# Patient Record
Sex: MALE | Race: WHITE | Employment: OTHER | URBAN - METROPOLITAN AREA
[De-identification: names, ages, dates, MRNs, and addresses within clinical notes are randomized per-mention and may not be internally consistent; named-entity substitution may affect disease eponyms.]

---

## 2017-06-23 ENCOUNTER — HOSPITAL ENCOUNTER (OUTPATIENT)
Dept: GENERAL RADIOLOGY | Age: 82
Discharge: HOME | End: 2017-06-23
Attending: INTERNAL MEDICINE
Payer: MEDICARE

## 2017-06-23 ENCOUNTER — APPOINTMENT (OUTPATIENT)
Dept: GENERAL RADIOLOGY | Age: 82
End: 2017-06-23
Attending: INTERNAL MEDICINE
Payer: MEDICARE

## 2017-06-23 DIAGNOSIS — R13.10 DYSPHAGIA: ICD-10-CM

## 2017-06-23 PROCEDURE — 92611 MOTION FLUOROSCOPY/SWALLOW: CPT

## 2017-06-23 PROCEDURE — G8998 SWALLOW D/C STATUS: HCPCS

## 2017-06-23 PROCEDURE — 74230 X-RAY XM SWLNG FUNCJ C+: CPT

## 2017-06-23 PROCEDURE — G8997 SWALLOW GOAL STATUS: HCPCS

## 2017-06-23 PROCEDURE — G8996 SWALLOW CURRENT STATUS: HCPCS

## 2017-06-23 NOTE — PROGRESS NOTES
[address] [place] [address]    Speech Pathology Modified barium swallow Study  Patient: Tay Cowan (3 y.o. male)  Date: 6/23/2017  Referring Provider:  Dr. Alda Sanders:   Patient alert, cooperative. Patient accompanied by friend of 40 years, and patient provided history. Patient with low vocal volume, which both he and friend report is baseline. Patient reports getting \"strangled\" when swallowing, specifically with thin liquids. Patient also reports getting \"strangled\" on saliva when sleeping on his back. Reports this is alleviated by sleeping on his side. Occasionally reported feeling strangled with food, but mostly with thin liquid. Reports this occurs 1x per day, sometimes a couple times per day. Reports this has been occurring \"for years. \" Reports Endoscopy ~2.5 years ago with esophageal dilatation. Reports no reflux since that time, and also believes strangled feeling alleviated by dilatation. Reports additional dilatation scheduled for 7/27/17. Patient also with Barium swallow scheduled in the future. PMH significant for reflux and COPD. Patient denies PNA. Reports heavy upper respiratory congestion, however this occurred decades ago. Eats regular/thin liquid diet. Has never seen SLP or had MBS in past. Minimal globus sensation with pills reported.     OBJECTIVE:   Past Medical History:   Past Medical History:   Diagnosis Date    AI (aortic insufficiency)     Arrhythmia     murmur    BPH (benign prostatic hyperplasia)     Carotid artery stenosis     S/P Left CEA    Chronic pain     low back    CKD (chronic kidney disease) stage 2, GFR 60-89 ml/min     COPD (chronic obstructive pulmonary disease) (HCC)     DDD (degenerative disc disease)     chronic back pain    Diabetes (HCC)     borderline    DJD (degenerative joint disease)     DVT (deep venous thrombosis) ([address]     1998    Fatty liver     Frequent falls     GERD (gastroesophageal reflux disease)     H/O adenomatous polyp of colon     Hypercholesterolemia     Hypertension     MR (mitral regurgitation)     Overactive bladder     PUD (peptic ulcer disease)     in late teens    Thrombocytopenia ([address]     Venous insufficiency (chronic) (peripheral)     Vitamin D deficiency      Past Surgical History:   Procedure Laterality Date    COLONOSCOPY,DIAGNOSTIC  12/4/2014         HX BACK SURGERY  2015    bone spurs removed from lumbar spine (per pt)    HX CAROTID ENDARTERECTOMY      left    HX CATARACT REMOVAL      bilateral lens implant bilateral    HX KNEE REPLACEMENT      bilateral    HX ORTHOPAEDIC      foreign body removal-nail   right leg    TOTAL KNEE ARTHROPLASTY      bilateral    UPPER GI ENDOSCOPY,BIOPSY  1/31/2013          Current Dietary Status:  Regular/thin  Radiologist: Dr. Shashank Acevedo: Lateral;Fluoro  Patient Position: upright in hausted chair    Trial 1:   Consistency Presented: Thin liquid;Puree; Solid;Pill/Tablet   How Presented: Self-fed/presented;Cup/sip;Cup/gulp;Straw;SLP-fed/presented;Spoon       Bolus Acceptance: No impairment   Bolus Formation/Control: Impaired: Delayed;Premature spillage; Other (comment) (spillage to lateral sulci during bolus hold)   Propulsion: Delayed (# of seconds)   Oral Residue: Lingual;Other (comment) (posterior--mild)   Initiation of Swallow: No impairment   Timing: No impairment   Penetration: Before swallow; To cords (x1 with initial cup sip)   Aspiration/Timing: Silent ; Before; Other (comment) (x1 with initial cup sip)   Pharyngeal Clearance: Vallecular residue;10-50% (reduced with spontaneous additional swallow to min)   Attempted Modifications: Double swallow;Cup/sip;Straw   Effective Modifications: Double swallow   Cues for Modifications: None           Decreased Tongue Base Retraction?: Yes  Laryngeal Elevation: Other (comment) (WFL epiglottic inversion and laryngeal closure)  Aspiration/Penetration Score: 8 (Aspiration-Contrast passes cords/glottis with no effort to eject, ie/silent aspiration)  Pharyngeal Symmetry: Not assessed  Pharyngeal-Esophageal Segment: No impairment  Pharyngeal Dysfunction: Decreased tongue base retraction    Oral Phase Severity: Mild (functional)  Pharyngeal Phase Severity: Mild (functional)     In compliance with CMSs Claims Based Outcome Reporting, the following G-code set was chosen for this patient based the use of the NOMS functional outcome to quantify this patient's level of swallowing impairment. Using the NOMS, the patient was determined to be at level 6 for swallow function which correlates with the CI= 1-19% level of severity. Based on the objective assessment provided within this note, the current, goal, and discharge g-codes are as follows:    Swallow  Swallowing:   Swallow Current Status CI= 1-19%   Swallow Goal Status CI= 1-19%   Swallow D/C Status CI= 1-19%          NOMS Swallowing Levels:  Level 1 (CN): NPO  Level 2 (CM): NPO but takes consistency in therapy  Level 3 (CL): Takes less than 50% of nutrition p.o. and continues with nonoral feedings; and/or safe with mod cues; and/or max diet restriction  Level 4 (CK): Safe swallow but needs mod cues; and/or mod diet restriction; and/or still requires some nonoral feeding/supplements  Level 5 (CJ): Safe swallow with min diet restriction; and/or needs min cues  Level 6 (CI): Independent with p.o.; rare cues; usually self cues; may need to avoid some foods or needs extra time  Level 7 ([address] [place] [address]): Independent for all p.o.  MATHEW. (2003). National Outcomes Measurement System (NOMS): Adult Speech-Language Pathology User's Guide. ASSESSMENT :  Based on the objective data described above, the patient presents with mild oropharyngeal dysphagia, however oropharyngeal swallow is overall functional. Patient with slow oral prep, premature spillage, delayed posterior propulsion, and mild posterior lingual residue. Pharyngeal dysphagia characterized by reduced tongue base retraction resulting in moderate pharyngeal residue that reduced to mild with a spontaneous additional swallow. Patient with silent aspiration x1 before the swallow on initial cup gulp of thin liquids secondary to premature spillage. Unable to reproduce aspiration event with additional trials via cup or straw. Barium tablet passed through pharynx with no difficulty. PLAN/RECOMMENDATIONS :  --Continue regular/thin liquid diet with general aspiration precautions, including upright for all PO intake, remain upright for 60 minutes after PO intake, small/single bites and sips, slow rate, and remove distractions with PO intake  --No further SLP follow up indicated     COMMUNICATION/EDUCATION:   The above findings and recommendations were discussed with: patient and female friend who verbalized understanding.     Thank you for this referral.  JACQUI Lockwood  Time Calculation: 20 mins

## 2017-06-26 ENCOUNTER — HOSPITAL ENCOUNTER (OUTPATIENT)
Dept: GENERAL RADIOLOGY | Age: 82
Discharge: HOME | End: 2017-06-26
Attending: INTERNAL MEDICINE
Payer: MEDICARE

## 2017-06-26 DIAGNOSIS — R13.10 DYSPHAGIA: ICD-10-CM

## 2017-06-26 PROCEDURE — 74220 X-RAY XM ESOPHAGUS 1CNTRST: CPT

## 2017-07-26 ENCOUNTER — OFFICE VISIT (OUTPATIENT)
Dept: INTERNAL MEDICINE CLINIC | Age: 82
End: 2017-07-26

## 2017-07-26 VITALS
WEIGHT: 231 LBS | BODY MASS INDEX: 29.65 KG/M2 | TEMPERATURE: 97.9 F | HEART RATE: 55 BPM | SYSTOLIC BLOOD PRESSURE: 139 MMHG | DIASTOLIC BLOOD PRESSURE: 63 MMHG | OXYGEN SATURATION: 95 % | RESPIRATION RATE: 20 BRPM | HEIGHT: 74 IN

## 2017-07-26 DIAGNOSIS — R29.6 FREQUENT FALLS: ICD-10-CM

## 2017-07-26 DIAGNOSIS — I10 ESSENTIAL HYPERTENSION, BENIGN: ICD-10-CM

## 2017-07-26 DIAGNOSIS — E78.2 HYPERLIPIDEMIA, MIXED: ICD-10-CM

## 2017-07-26 DIAGNOSIS — N18.2 CKD (CHRONIC KIDNEY DISEASE) STAGE 2, GFR 60-89 ML/MIN: ICD-10-CM

## 2017-07-26 DIAGNOSIS — I38 HEART VALVE DISORDER: ICD-10-CM

## 2017-07-26 DIAGNOSIS — E11.40 CONTROLLED TYPE 2 DIABETES MELLITUS WITH DIABETIC NEUROPATHY, WITHOUT LONG-TERM CURRENT USE OF INSULIN (HCC): Primary | ICD-10-CM

## 2017-07-26 PROBLEM — R15.1 FECAL SOILING: Status: ACTIVE | Noted: 2017-07-26

## 2017-07-26 PROBLEM — R60.0 EDEMA EXTREMITIES: Status: ACTIVE | Noted: 2017-07-26

## 2017-07-26 PROBLEM — S32.020A COMPRESSION FRACTURE OF L2 LUMBAR VERTEBRA (HCC): Status: ACTIVE | Noted: 2017-07-26

## 2017-07-26 PROBLEM — I83.009 VENOUS STASIS ULCERS (HCC): Status: ACTIVE | Noted: 2017-07-26

## 2017-07-26 PROBLEM — J30.9 ALLERGIC RHINITIS: Status: ACTIVE | Noted: 2017-07-26

## 2017-07-26 PROBLEM — Z91.81 RISK FOR FALLS: Status: ACTIVE | Noted: 2017-07-26

## 2017-07-26 PROBLEM — N52.9 ED (ERECTILE DYSFUNCTION): Status: ACTIVE | Noted: 2017-07-26

## 2017-07-26 PROBLEM — M65.332 TRIGGER MIDDLE FINGER OF LEFT HAND: Status: ACTIVE | Noted: 2017-07-26

## 2017-07-26 PROBLEM — K42.9 UMBILICAL HERNIA: Status: ACTIVE | Noted: 2017-07-26

## 2017-07-26 PROBLEM — K44.9 HIATAL HERNIA: Status: ACTIVE | Noted: 2017-07-26

## 2017-07-26 PROBLEM — R13.10 DYSPHAGIA: Status: ACTIVE | Noted: 2017-07-26

## 2017-07-26 PROBLEM — L97.909 VENOUS STASIS ULCERS (HCC): Status: ACTIVE | Noted: 2017-07-26

## 2017-07-26 PROBLEM — R80.9 PROTEINURIA: Status: ACTIVE | Noted: 2017-07-26

## 2017-07-26 PROBLEM — H91.90 HEARING LOSS: Status: ACTIVE | Noted: 2017-07-26

## 2017-07-26 PROBLEM — M25.511 RIGHT SHOULDER PAIN: Status: ACTIVE | Noted: 2017-07-26

## 2017-07-26 PROBLEM — G56.22 TARDY PALSY OF LEFT ULNAR NERVE: Status: ACTIVE | Noted: 2017-07-26

## 2017-07-26 PROBLEM — G89.29 CHRONIC BACK PAIN: Status: ACTIVE | Noted: 2017-07-26

## 2017-07-26 PROBLEM — M54.9 CHRONIC BACK PAIN: Status: ACTIVE | Noted: 2017-07-26

## 2017-07-26 PROBLEM — K63.5 COLON POLYP: Status: ACTIVE | Noted: 2017-07-26

## 2017-07-26 PROBLEM — I45.10 INCOMPLETE RIGHT BUNDLE BRANCH BLOCK: Status: ACTIVE | Noted: 2017-07-26

## 2017-07-26 LAB
ALBUMIN SERPL-MCNC: 4.3 G/DL (ref 3.9–5.4)
ALKALINE PHOS POC: 76 U/L (ref 38–126)
ALT SERPL-CCNC: 38 U/L (ref 9–52)
AST SERPL-CCNC: 36 U/L (ref 14–36)
BUN BLD-MCNC: 51 MG/DL (ref 9–20)
CALCIUM BLD-MCNC: 9.4 MG/DL (ref 8.4–10.2)
CHLORIDE BLD-SCNC: 102 MMOL/L (ref 98–107)
CHOLEST SERPL-MCNC: 139 MG/DL (ref 0–200)
CO2 POC: 27 MMOL/L (ref 22–32)
CREAT BLD-MCNC: 0.7 MG/DL (ref 0.8–1.5)
EGFR (POC): 89.1
GLUCOSE POC: 114 MG/DL (ref 75–110)
HBA1C MFR BLD HPLC: 6.2 % (ref 4–5.7)
HDLC SERPL-MCNC: 30 MG/DL (ref 35–130)
LDL CHOLESTEROL POC: 75.6 MG/DL (ref 0–130)
POTASSIUM SERPL-SCNC: 4.4 MMOL/L (ref 3.6–5)
PROT SERPL-MCNC: 7.2 G/DL (ref 6.3–8.2)
SODIUM SERPL-SCNC: 146 MMOL/L (ref 137–145)
TCHOL/HDL RATIO (POC): 4.6 (ref 0–4)
TOTAL BILIRUBIN POC: 1 MG/DL (ref 0.2–1.3)
TRIGL SERPL-MCNC: 167 MG/DL (ref 0–200)
VLDLC SERPL CALC-MCNC: 33.4 MG/DL

## 2017-07-26 RX ORDER — FUROSEMIDE 80 MG/1
120 TABLET ORAL DAILY
COMMUNITY
End: 2018-01-24 | Stop reason: SDUPTHER

## 2017-07-26 RX ORDER — ATENOLOL 50 MG/1
TABLET ORAL DAILY
COMMUNITY
End: 2017-09-09 | Stop reason: SDUPTHER

## 2017-07-26 RX ORDER — NAPROXEN SODIUM 220 MG
220 TABLET ORAL
COMMUNITY
End: 2019-10-02

## 2017-07-26 RX ORDER — CLOTRIMAZOLE AND BETAMETHASONE DIPROPIONATE 10; .64 MG/G; MG/G
CREAM TOPICAL 2 TIMES DAILY
COMMUNITY
End: 2018-01-03

## 2017-07-26 RX ORDER — ATORVASTATIN CALCIUM 40 MG/1
40 TABLET, FILM COATED ORAL DAILY
COMMUNITY
End: 2018-01-03 | Stop reason: SDUPTHER

## 2017-07-26 RX ORDER — POLYETHYLENE GLYCOL 3350 17 G/17G
17 POWDER, FOR SOLUTION ORAL
COMMUNITY

## 2017-07-26 RX ORDER — VARDENAFIL HYDROCHLORIDE 20 MG/1
20 TABLET ORAL AS NEEDED
COMMUNITY

## 2017-07-26 NOTE — MR AVS SNAPSHOT
Visit Information Date & Time Provider Department Dept. Phone Encounter #  
 7/26/2017 10:30 AM Gregory Finch MD [facility] [phone] [id number] Follow-up Instructions Return in about 3 months (around 10/26/2017) for Fasting, Diabetes, lipids. Upcoming Health Maintenance Date Due  
 LIPID PANEL Q1 1935 FOOT EXAM Q1 1/25/1947 MICROALBUMIN Q1 1/25/1947 EYE EXAM RETINAL OR DILATED Q1 1/25/1947 DTaP/Tdap/Td series (1 - Tdap) 1/25/1958 ZOSTER VACCINE AGE 60> 11/25/1996 GLAUCOMA SCREENING Q2Y 1/25/2002 MEDICARE YEARLY EXAM 1/25/2002 HEMOGLOBIN A1C Q6M 4/9/2016 INFLUENZA AGE 9 TO ADULT 8/1/2017 Pneumococcal 65+ Low/Medium Risk (2 of 2 - PPSV23) 9/12/2019 Allergies as of 7/26/2017  Review Complete On: 7/26/2017 By: Fabienne Hopkins LPN Severity Noted Reaction Type Reactions Pcn [Penicillins] High 08/17/2011   Systemic Swelling Swelling of legs & feet Oxycontin [Oxycodone] Medium 06/19/2012   Side Effect Other (comments) Agitation/felt irritable Ibuprofen  03/13/2015    Other (comments) \"Rage\" Niaspan [Niacin]  07/26/2017    Rash Vesicare [Solifenacin]  07/26/2017    Other (comments) Attributes: Abdominal pain Current Immunizations  Reviewed on 3/12/2015 Name Date Hep A Vaccine 9/1/2007 Influenza Vaccine 9/12/2014 Pneumococcal Conjugate (PCV-13) 1/1/2012 Pneumococcal Vaccine (Unspecified Type) 9/12/2014 Td 10/19/2012 Zoster Vaccine, Live 10/1/2010 Not reviewed this visit You Were Diagnosed With   
  
 Codes Comments Controlled type 2 diabetes mellitus with diabetic neuropathy, without long-term current use of insulin ([facility]    -  Primary ICD-10-CM: E11.40 ICD-9-CM: 250.60, 357.2 Essential hypertension, benign     ICD-10-CM: I10 
ICD-9-CM: 401.1 Hyperlipidemia, mixed     ICD-10-CM: E78.2 ICD-9-CM: 272.2 Frequent falls     ICD-10-CM: R29.6 ICD-9-CM: V15.88   
 CKD (chronic kidney disease) stage 2, GFR 60-89 ml/min     ICD-10-CM: N18.2 ICD-9-CM: 261. 2 Vitals BP Pulse Temp Resp Height(growth percentile) Weight(growth percentile) 139/63 (BP 1 Location: Right arm, BP Patient Position: Sitting) (!) 55 97.9 °F (36.6 °C) (Oral) 20 6' 2\" (1.88 m) 231 lb (104.8 kg) SpO2 BMI Smoking Status 95% 29.66 kg/m2 Former Smoker BMI and BSA Data Body Mass Index Body Surface Area  
 29.66 kg/m 2 2.34 m 2 Preferred Pharmacy Pharmacy Name Phone [address] [facility] [phone] Your Updated Medication List  
  
   
This list is accurate as of: 7/26/17 11:41 AM.  Always use your most recent med list.  
  
  
  
  
 ACCU-CHEK SHIV PLUS TEST STRP  
by Does Not Apply route daily. Indications: 1 (one) strip daily ALEVE 220 mg tablet Generic drug:  naproxen sodium Take 220 mg by mouth two (2) times daily as needed. aspirin 325 mg tablet Commonly known as:  ASPIRIN Take 325 mg by mouth daily. atenolol 50 mg tablet Commonly known as:  TENORMIN Take  by mouth daily. atorvastatin 40 mg tablet Commonly known as:  LIPITOR Take 40 mg by mouth daily. clotrimazole-betamethasone topical cream  
Commonly known as:  Laine [place] Apply  to affected area two (2) times a day. Indications: as needed FIBER PO Take  by mouth daily. Indications: 2 TBSP daily FLONASE 50 mcg/actuation nasal spray Generic drug:  fluticasone 2 Sprays nightly. furosemide 80 mg tablet Commonly known as:  LASIX Take 120 mg by mouth daily. LEVITRA 20 mg tablet Generic drug:  vardenafil Take 20 mg by mouth as needed. lisinopril 40 mg tablet Commonly known as:  Pecola Cypher Take 1 Tab by mouth every morning.  Hold until Blood pressure is greater than 120 mmHg. Have home health nursing check  Indications: HYPERTENSION  
  
 LOVAZA 1 gram capsule Generic drug:  omega-3 acid ethyl esters Take 2 g by mouth two (2) times a day. metFORMIN 1,000 mg tablet Commonly known as:  GLUCOPHAGE Take 1,000 mg by mouth two (2) times daily (with meals). MIRALAX 17 gram/dose powder Generic drug:  polyethylene glycol Take 17 g by mouth daily. mometasone 0.1 % topical cream  
Commonly known as:  Glencross Cease Apply  to affected area two (2) times a day. Indications: SKIN RASH MULTIPLE VITAMINS tablet Generic drug:  multivitamin Take 1 Tab by mouth daily. nystatin topical cream  
Commonly known as:  MYCOSTATIN Apply  to affected area two (2) times a day. VITAMIN D3 1,000 unit Cap Generic drug:  cholecalciferol Take 1 Cap by mouth two (2) times a day. We Performed the Following AMB POC COMPREHENSIVE METABOLIC PANEL [08363 CPT(R)] AMB POC HEMOGLOBIN A1C [52219 CPT(R)] AMB POC LIPID PROFILE [66013 CPT(R)] Follow-up Instructions Return in about 3 months (around 10/26/2017) for Fasting, Diabetes, lipids. Introducing [facility]! [facility] introduces [employer] patient portal. Now you can access parts of your medical record, email your doctor's office, and request medication refills online. 1. In your internet browser, go to [email or web address] 2. Click on the First Time User? Click Here link in the Sign In box. You will see the New Member Sign Up page. 3. Enter your [employer] Access Code exactly as it appears below. You will not need to use this code after youve completed the sign-up process. If you do not sign up before the expiration date, you must request a new code. · [employer] Access Code: [email or web address] Expires: 9/7/2017  2:09 PM 
 
4.  Enter the last four digits of your Social Security Number (xxxx) and Date of Birth (mm/dd/yyyy) as indicated and click Submit. You will be taken to the next sign-up page. 5. Create a [employer] ID. This will be your [employer] login ID and cannot be changed, so think of one that is secure and easy to remember. 6. Create a [employer] password. You can change your password at any time. 7. Enter your Password Reset Question and Answer. This can be used at a later time if you forget your password. 8. Enter your e-mail address. You will receive e-mail notification when new information is available in [address] 9. Click Sign Up. You can now view and download portions of your medical record. 10. Click the Download Summary menu link to download a portable copy of your medical information. If you have questions, please visit the Frequently Asked Questions section of the [employer] website. Remember, [employer] is NOT to be used for urgent needs. For medical emergencies, dial 911. Now available from your iPhone and Android! Please provide this summary of care documentation to your next provider. Your primary care clinician is listed as Niurka Esteban. If you have any questions after today's visit, please call [phone].

## 2017-07-26 NOTE — PROGRESS NOTES
Subjective:   Max Blake is a [de-identified] y.o. male      Chief Complaint   Patient presents with    Hypertension    Cholesterol Problem    Diabetes        History of present illness:  Mr. Lenard Cardenas returns for routine follow up regarding his hypertension, hyperlipidemia and diabetes. He feels his blood sugars have been well controlled on his current regimen. He's been maintaining a prudent diet. He continues his usual medications without side effects with regard to his longstanding hypertension. He denies new problems with shortness of breath or chest pain. He's noted no nausea, vomiting, abdominal pain, melena or other GI symptoms. He seems to be voiding reasonably well. He continues to have issues with lower extremity weakness, but has been very sedentary. He did have another fall recently. We had a long discussion concerning this and the use of his walker all of the time, even when he is moving around in the house and just trying to get up from a chair in the house. In addition he needs to get back to walking on a regular basis to strengthen his lower extremities. He has been through physical therapy a couple of times before and not really responded that well to it, so he needs to do his own walking. With regard to his back, Dr. Godwin Gant has gotten that issue under control as he's not having pain or sciatica at this point. I did talk to him about the statistics concerning someone his age falling and fracturing a hip and the poor outcomes that generally result. His lower extremity weakness is associated with his lumbar spinal stenosis, but can be improved with walking. He has no other new issues today.         Patient Active Problem List   Diagnosis Code    DJD (degenerative joint disease) of knee M17.10    Diabetes mellitus type 2, controlled ([address] E11.9    Essential hypertension, benign I10    Hyperlipidemia, mixed E78.2    COPD (chronic obstructive pulmonary disease) ([facility] [address] J44.9    Syncope R55    Frequent falls R29.6    Lower extremity weakness R29.898    Lumbar spinal stenosis M48.06    LBP (low back pain) M54.5    Spinal stenosis M48.00    CKD (chronic kidney disease) stage 2, GFR 60-89 ml/min N18.2    UTI (lower urinary tract infection) N39.0    Colon polyp K63.5    Compression fracture of L2 lumbar vertebra (HCC) S32.020A    Proteinuria R80.9    Right shoulder pain M25.511    Hiatal hernia T61.1    Umbilical hernia M11.8    BMI 32.0-32.9,adult Z68.32    Venous stasis ulcers (HCC) I83.009, L97.909    Dysphagia R13.10    Trigger middle finger of left hand M65.332    Fecal soiling R15.1    Allergic rhinitis J30.9    Edema extremities R60.0    ED (erectile dysfunction) N52.9    Hearing loss H91.90    Risk for falls Z91.81    Heart valve disorder I38    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    Incomplete right bundle branch block I45.10    Chronic back pain M54.9, G89.29      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    Chronic pain     low back    CKD (chronic kidney disease) stage 2, GFR 60-89 ml/min     Colon polyp 7/26/2017    Onset Date: 11/2001 Comments: H/O    Compression fracture of L2 lumbar vertebra ([facility] 7/26/2017    Problem onset is 1985 Story: L2     [facility]ch[facility] obstructive pulmonary disease) ([facility])     DDD (degenerative disc disease)     chronic back pain    Diabetes (HCC)     borderline    DJD (degenerative joint disease)     DVT (deep venous thrombosis) ([facility] [address]     1998    Dysphagia 7/26/2017    ED (erectile dysfunction) 7/26/2017    Edema extremities 7/26/2017    Fatty liver     Fecal soiling 7/26/2017    Frequent falls     GERD (gastroesophageal reflux disease)     H/O adenomatous polyp of colon     Hearing loss 7/26/2017    Story: wears bilat hearing aids     Heart valve disorder 7/26/2017    Story: mild MR/AI    Hiatal hernia 7/26/2017    Hypercholesterolemia     Hypertension     Incomplete right bundle branch block 7/26/2017    MR (mitral regurgitation)     Overactive bladder     Proteinuria 7/26/2017    Problem onset is 1975     PUD (peptic ulcer disease)     in late teens   [facility] Right shoulder pain 7/26/2017    Risk for falls 7/26/2017    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    Thrombocytopenia (HCC)     Trigger middle finger of left hand 0/53/4027    Umbilical hernia 2/43/2327    Venous insufficiency (chronic) (peripheral)     Venous stasis ulcers (HCC) 7/26/2017    Vitamin D deficiency       Allergies   Allergen Reactions    Pcn [Penicillins] Swelling     Swelling of legs & feet    Oxycontin [Oxycodone] Other (comments)     Agitation/felt irritable    Ibuprofen Other (comments)     \"Rage\"    Niaspan [Niacin] Rash    Vesicare [Solifenacin] Other (comments)     Attributes: Abdominal pain      Family History   Problem Relation Age of Onset    Cancer Mother     Hypertension Mother     Heart Disease Brother     Hypertension Brother     Hypertension Sister     Hypertension Sister       Social History     Social History    Marital status:      Spouse name: N/A    Number of children: N/A    Years of education: N/A     Occupational History    Not on file. Social History Main Topics    Smoking status: Former Smoker     Packs/day: 2.00     Years: 35.00     Quit date: 6/19/1998    Smokeless tobacco: Never Used    Alcohol use No      Comment: rarely    Drug use: No    Sexual activity: Not on file     Other Topics Concern    Not on file     Social History Narrative     Prior to Admission medications    Medication Sig Start Date End Date Taking? Authorizing Provider   vardenafil (LEVITRA) 20 mg tablet Take 20 mg by mouth as needed.    Yes Historical Provider   clotrimazole-betamethasone (LOTRISONE) topical cream Apply  to affected area two (2) times a day. Indications: as needed   Yes Historical Provider   BLOOD SUGAR DIAGNOSTIC (ACCU-CHEK SHIV PLUS TEST STRP) by Does Not Apply route daily. Indications: 1 (one) strip daily   Yes Historical Provider   atorvastatin (LIPITOR) 40 mg tablet Take 40 mg by mouth daily. Yes Historical Provider   PSYLLIUM SEED, WITH DEXTROSE, (FIBER PO) Take  by mouth daily. Indications: 2 TBSP daily   Yes Historical Provider   polyethylene glycol (MIRALAX) 17 gram/dose powder Take 17 g by mouth daily. Yes Historical Provider   naproxen sodium (ALEVE) 220 mg tablet Take 220 mg by mouth two (2) times daily as needed. Yes Historical Provider   atenolol (TENORMIN) 50 mg tablet Take  by mouth daily. Yes Historical Provider   furosemide (LASIX) 80 mg tablet Take 120 mg by mouth daily. Yes Historical Provider   lisinopril (PRINIVIL, ZESTRIL) 40 mg tablet Take 1 Tab by mouth every morning. Hold until Blood pressure is greater than 120 mmHg. Have home health nursing check  Indications: HYPERTENSION 10/21/15  Yes Millicent Jansen NP   metFORMIN (GLUCOPHAGE) 1,000 mg tablet Take 1,000 mg by mouth two (2) times daily (with meals). Yes Historical Provider   mometasone (ELOCON) 0.1 % topical cream Apply  to affected area two (2) times a day. Indications: SKIN RASH   Yes Historical Provider   Cholecalciferol, Vitamin D3, (VITAMIN D3) 1,000 unit cap Take 1 Cap by mouth two (2) times a day. Yes Historical Provider   multivitamin (MULTIPLE VITAMINS) tablet Take 1 Tab by mouth daily. Yes Historical Provider   fluticasone (FLONASE) 50 mcg/actuation nasal spray 2 Sprays nightly. Yes Historical Provider   aspirin (ASPIRIN) 325 mg tablet Take 325 mg by mouth daily. Yes Dolly Perrin MD   omega-3 acid ethyl esters (LOVAZA) 1 gram capsule Take 2 g by mouth two (2) times a day.    Yes Dolly Perrin MD   nystatin (MYCOSTATIN) topical cream Apply  to affected area two (2) times a day. Yes Phys Other, MD        Review of Systems              Constitutional:  He denies fever, weight loss, sweats or fatigue. HEENT:  No blurred or double vision, headache or dizziness. No difficulty with swallowing, taste, speech or smell. Respiratory:  No cough, wheezing or shortness of breath. No sputum production. Cardiac:  Denies chest pain, palpitations, unexplained indigestion, syncope, edema, PND or orthopnea. GI:  No changes in bowel movements, no abdominal pain, no bloating, anorexia, nausea, vomiting or heartburn. :  No frequency or dysuria. Denies incontinence or sexual dysfunction. Extremities:  No joint pain, stiffness or swelling. Skin:  No recent rashes or mole changes. Neurological:  No numbness, tingling, burning paresthesias or loss of motor strength. No syncope, dizziness, frequent headaches or memory loss. Frequent falls. Objective:     Vitals:    07/26/17 1106   BP: 139/63   Pulse: (!) 55   Resp: 20   Temp: 97.9 °F (36.6 °C)   TempSrc: Oral   SpO2: 95%   Weight: 231 lb (104.8 kg)   Height: 6' 2\" (1.88 m)   PainSc:   0 - No pain       Body mass index is 29.66 kg/(m^2). Physical Examination:              General Appearance:  Well-developed, well-nourished, no acute  distress. HEENT:      Ears:  The TMs and ear canals were clear. Eyes:  The pupillary responses were normal.  Extraocular muscle function intact. Lids and conjunctiva not injected. Funduscopic exam revealed sharp disc margins. Pharynx:  Clear with teeth in good repair. No masses were noted. Neck:  Supple without thyromegaly or adenopathy. No JVD noted. No carotid                bruits. Lungs:  Clear to auscultation and percussion. Cardiac:  Regular rate and rhythm without murmur. PMI not displaced. No gallop, rub or click. GI: nontender w/o mass. Normal BS's. Extremities:  No clubbing, cyanosis or edema.   Skin:  No rash or unusual mole changes noted. Lymph Nodes:  None felt in the cervical, supraclavicular, axillary or inguinal region. Neurological:  Cranial nerves II-XII grossly intact. Motor strength 5/5. DTR'sabsent in [address] and gait normal.            Assessment/Plan:   Impressions:      ICD-10-CM ICD-9-CM    1. Controlled type 2 diabetes mellitus with diabetic neuropathy, without long-term current use of insulin ([facility]) E11.40 250.60 AMB POC COMPREHENSIVE METABOLIC PANEL     225.5 AMB POC HEMOGLOBIN A1C   2. Essential hypertension, benign I10 401.1 AMB POC COMPREHENSIVE METABOLIC PANEL   3. Hyperlipidemia, mixed E78.2 272.2 AMB POC COMPREHENSIVE METABOLIC PANEL      AMB POC LIPID PROFILE   4. Frequent falls R29.6 V15.88    5. CKD (chronic kidney disease) stage 2, GFR 60-89 ml/min N18.2 585.2 AMB POC COMPREHENSIVE METABOLIC PANEL        Plan:  1. Continue present meds  2. Lifestyle modifications including Na restriction, low carb/fat diet, weight reduction and exercise (at least a walking program daily). 3. ALWAYS use walker  4. Return in about 3 months (around 10/26/2017) for Fasting, Diabetes, lipids.       Orders Placed This Encounter    AMB POC COMPREHENSIVE METABOLIC PANEL    AMB POC HEMOGLOBIN A1C    AMB POC LIPID PROFILE       Latrell Tello MD

## 2017-07-26 NOTE — PROGRESS NOTES
Chief Complaint   Patient presents with    Hypertension    Cholesterol Problem    Diabetes     Pt here for follow up. 1. Have you been to the ER, urgent care clinic since your last visit? Hospitalized since your last visit? No    2. Have you seen or consulted any other health care providers outside of the [address] [place] [address] since your last visit? Include any pap smears or colon screening.  No

## 2017-07-27 ENCOUNTER — HOSPITAL ENCOUNTER (OUTPATIENT)
Age: 82
Setting detail: OUTPATIENT SURGERY
Discharge: HOME | End: 2017-07-27
Attending: INTERNAL MEDICINE | Admitting: INTERNAL MEDICINE
Payer: MEDICARE

## 2017-07-27 ENCOUNTER — ANESTHESIA (OUTPATIENT)
Dept: ENDOSCOPY | Age: 82
End: 2017-07-27
Payer: MEDICARE

## 2017-07-27 ENCOUNTER — ANESTHESIA EVENT (OUTPATIENT)
Dept: ENDOSCOPY | Age: 82
End: 2017-07-27
Payer: MEDICARE

## 2017-07-27 ENCOUNTER — APPOINTMENT (OUTPATIENT)
Dept: GENERAL RADIOLOGY | Age: 82
End: 2017-07-27
Payer: MEDICARE

## 2017-07-27 VITALS
DIASTOLIC BLOOD PRESSURE: 56 MMHG | HEART RATE: 51 BPM | OXYGEN SATURATION: 100 % | WEIGHT: 233.31 LBS | RESPIRATION RATE: 18 BRPM | BODY MASS INDEX: 29.94 KG/M2 | SYSTOLIC BLOOD PRESSURE: 129 MMHG | HEIGHT: 74 IN | TEMPERATURE: 97.6 F

## 2017-07-27 LAB
GLUCOSE BLD STRIP.AUTO-MCNC: 129 MG/DL (ref 65–100)
SERVICE CMNT-IMP: ABNORMAL

## 2017-07-27 PROCEDURE — C1726 CATH, BAL DIL, NON-VASCULAR: HCPCS | Performed by: INTERNAL MEDICINE

## 2017-07-27 PROCEDURE — 82962 GLUCOSE BLOOD TEST: CPT

## 2017-07-27 PROCEDURE — 76060000031 HC ANESTHESIA FIRST 0.5 HR: Performed by: INTERNAL MEDICINE

## 2017-07-27 PROCEDURE — 76040000019: Performed by: INTERNAL MEDICINE

## 2017-07-27 PROCEDURE — 74011250636 HC RX REV CODE- 250/636: Performed by: INTERNAL MEDICINE

## 2017-07-27 PROCEDURE — 74011250636 HC RX REV CODE- 250/636

## 2017-07-27 PROCEDURE — 77030019988 HC FCPS ENDOSC DISP BSC -B: Performed by: INTERNAL MEDICINE

## 2017-07-27 PROCEDURE — 77030018712 HC DEV BLLN INFL BSC -B: Performed by: INTERNAL MEDICINE

## 2017-07-27 PROCEDURE — 88305 TISSUE EXAM BY PATHOLOGIST: CPT | Performed by: INTERNAL MEDICINE

## 2017-07-27 PROCEDURE — 74011000250 HC RX REV CODE- 250

## 2017-07-27 RX ORDER — SODIUM CHLORIDE 9 MG/ML
75 INJECTION, SOLUTION INTRAVENOUS CONTINUOUS
Status: DISPENSED | OUTPATIENT
Start: 2017-07-27 | End: 2017-07-27

## 2017-07-27 RX ORDER — SODIUM CHLORIDE 0.9 % (FLUSH) 0.9 %
5-10 SYRINGE (ML) INJECTION AS NEEDED
Status: ACTIVE | OUTPATIENT
Start: 2017-07-27 | End: 2017-07-27

## 2017-07-27 RX ORDER — PROPOFOL 10 MG/ML
INJECTION, EMULSION INTRAVENOUS AS NEEDED
Status: DISCONTINUED | OUTPATIENT
Start: 2017-07-27 | End: 2017-07-27 | Stop reason: HOSPADM

## 2017-07-27 RX ORDER — MIDAZOLAM HYDROCHLORIDE 1 MG/ML
.25-5 INJECTION, SOLUTION INTRAMUSCULAR; INTRAVENOUS
Status: ACTIVE | OUTPATIENT
Start: 2017-07-27 | End: 2017-07-27

## 2017-07-27 RX ORDER — SODIUM CHLORIDE 0.9 % (FLUSH) 0.9 %
5-10 SYRINGE (ML) INJECTION EVERY 8 HOURS
Status: DISCONTINUED | OUTPATIENT
Start: 2017-07-27 | End: 2017-07-27 | Stop reason: HOSPADM

## 2017-07-27 RX ORDER — EPINEPHRINE 0.1 MG/ML
1 INJECTION INTRACARDIAC; INTRAVENOUS
Status: ACTIVE | OUTPATIENT
Start: 2017-07-27 | End: 2017-07-27

## 2017-07-27 RX ORDER — ATROPINE SULFATE 0.1 MG/ML
0.5 INJECTION INTRAVENOUS
Status: ACTIVE | OUTPATIENT
Start: 2017-07-27 | End: 2017-07-27

## 2017-07-27 RX ORDER — LIDOCAINE HYDROCHLORIDE 20 MG/ML
INJECTION, SOLUTION EPIDURAL; INFILTRATION; INTRACAUDAL; PERINEURAL AS NEEDED
Status: DISCONTINUED | OUTPATIENT
Start: 2017-07-27 | End: 2017-07-27 | Stop reason: HOSPADM

## 2017-07-27 RX ORDER — OMEPRAZOLE 20 MG/1
20 CAPSULE, DELAYED RELEASE ORAL DAILY
Qty: 30 CAP | Refills: 11 | Status: SHIPPED | OUTPATIENT
Start: 2017-07-27 | End: 2018-07-22

## 2017-07-27 RX ORDER — FLUMAZENIL 0.1 MG/ML
0.2 INJECTION INTRAVENOUS
Status: ACTIVE | OUTPATIENT
Start: 2017-07-27 | End: 2017-07-27

## 2017-07-27 RX ORDER — FENTANYL CITRATE 50 UG/ML
25 INJECTION, SOLUTION INTRAMUSCULAR; INTRAVENOUS
Status: ACTIVE | OUTPATIENT
Start: 2017-07-27 | End: 2017-07-27

## 2017-07-27 RX ORDER — DEXTROMETHORPHAN/PSEUDOEPHED 2.5-7.5/.8
1.2 DROPS ORAL
Status: DISCONTINUED | OUTPATIENT
Start: 2017-07-27 | End: 2017-07-27 | Stop reason: HOSPADM

## 2017-07-27 RX ORDER — NALOXONE HYDROCHLORIDE 0.4 MG/ML
0.4 INJECTION, SOLUTION INTRAMUSCULAR; INTRAVENOUS; SUBCUTANEOUS
Status: ACTIVE | OUTPATIENT
Start: 2017-07-27 | End: 2017-07-27

## 2017-07-27 RX ADMIN — LIDOCAINE HYDROCHLORIDE 40 MG: 20 INJECTION, SOLUTION EPIDURAL; INFILTRATION; INTRACAUDAL; PERINEURAL at 09:09

## 2017-07-27 RX ADMIN — SODIUM CHLORIDE 75 ML/HR: 900 INJECTION, SOLUTION INTRAVENOUS at 08:54

## 2017-07-27 RX ADMIN — PROPOFOL 160 MG: 10 INJECTION, EMULSION INTRAVENOUS at 09:18

## 2017-07-27 NOTE — PERIOP NOTES
Anesthesia reports 160mg Propofol, 40mg Lidocaine and 250mL NS given during procedure. Received report from anesthesia staff on vital signs and status of patient.

## 2017-07-27 NOTE — H&P
Gastroenterology Outpatient History and Physical    Patient: Kim Donny    Physician: Vijaya Lemus MD    Chief Complaint: dysphagia  History of Present Illness: [de-identified] M with dyspahgia. Esophageal dyskinesia by recent BS.   Esophagitis noted on EGd in 2013 with positive response of dysphagia then to dilation and PPI therapy    History:  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    Chronic pain     low back    CKD (chronic kidney disease) stage 2, GFR 60-89 ml/min     Colon polyp 7/26/2017    Onset Date: 11/2001 Comments: H/O    Compression fracture of L2 lumbar vertebra ([address] 7/26/2017    Problem onset is 1985 Story: L2     COPD (chronic obstructive pulmonary disease) ([address]     DDD (degenerative disc disease)     chronic back pain    Diabetes ([address]     borderline    DJD (degenerative joint disease)     DVT (deep venous thrombosis) ([address]     1998    Dysphagia 7/26/2017    ED (erectile dysfunction) 7/26/2017    Edema extremities 7/26/2017    Fatty liver     Fecal soiling 7/26/2017    Frequent falls     GERD (gastroesophageal reflux disease)     H/O adenomatous polyp of colon     Hearing loss 7/26/2017    Story: wears bilat hearing aids     Heart valve disorder 7/26/2017    Story: mild MR/AI    Hiatal hernia 7/26/2017    Hypercholesterolemia     Hypertension     Incomplete right bundle branch block 7/26/2017    MR (mitral regurgitation)     Overactive bladder     Proteinuria 7/26/2017    Problem onset is 1975     PUD (peptic ulcer disease)     in late teens    Right shoulder pain 7/26/2017    Risk for falls 7/26/2017    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    Thrombocytopenia (HCC)     Trigger middle finger of left hand 3/45/0661    Umbilical hernia 7/36/1038    Venous insufficiency (chronic) (peripheral)     Venous stasis ulcers ([facility] 7/26/2017    Vitamin D deficiency       Past Surgical History:   Procedure Laterality Date    COLONOSCOPY,DIAGNOSTIC  12/4/2014         HX BACK SURGERY  2015    bone spurs removed from lumbar spine (per pt)    HX CAROTID ENDARTERECTOMY      left    HX CATARACT REMOVAL      bilateral lens implant bilateral    HX KNEE REPLACEMENT      bilateral    HX ORTHOPAEDIC      foreign body removal-nail   right leg    DE EGD TRANSORAL BIOPSY SINGLE/MULTIPLE  1/31/2013         TOTAL KNEE ARTHROPLASTY      bilateral      Social History     Social History    Marital status:      Spouse name: N/A    Number of children: N/A    Years of education: N/A     Social History Main Topics    Smoking status: Former Smoker     Packs/day: 2.00     Years: 35.00     Quit date: 6/19/1998    Smokeless tobacco: Never Used    Alcohol use No      Comment: rarely    Drug use: No    Sexual activity: Not Asked     Other Topics Concern    None     Social History Narrative      Family History   Problem Relation Age of Onset    Cancer Mother     Hypertension Mother     Heart Disease Brother     Hypertension Brother     Hypertension Sister     Hypertension Sister       Patient Active Problem List   Diagnosis Code    DJD (degenerative joint disease) of knee M17.10    Diabetes mellitus type 2, controlled ([facility] [address] E11.9    Essential hypertension, benign I10    Hyperlipidemia, mixed E78.2    COPD (chronic obstructive pulmonary disease) ([facility] J44.9    Syncope R55    Frequent falls R29.6    Lower extremity weakness R29.898    Lumbar spinal stenosis M48.06    LBP (low back pain) M54.5    Spinal stenosis M48.00    CKD (chronic kidney disease) stage 2, GFR 60-89 ml/min N18.2    UTI (lower urinary tract infection) N39.0    Colon polyp K63.5    Compression fracture of L2 lumbar vertebra (HCC) S32.020A    Proteinuria R80.9    Right shoulder pain M25.511    Hiatal hernia Z34.3    Umbilical hernia J11.7    BMI 32.0-32.9,adult Z68.32    Venous stasis ulcers (HCC) I83.009, L97.909    Dysphagia R13.10    Trigger middle finger of left hand M65.332    Fecal soiling R15.1    Allergic rhinitis J30.9    Edema extremities R60.0    ED (erectile dysfunction) N52.9    Hearing loss H91.90    Risk for falls Z91.81    Heart valve disorder I38    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 Authorizing Provider   vardenafil (LEVITRA) 20 mg tablet Take 20 mg by mouth as needed. Yes Historical Provider   clotrimazole-betamethasone (LOTRISONE) topical cream Apply  to affected area two (2) times a day. Indications: as needed   Yes Historical Provider   atorvastatin (LIPITOR) 40 mg tablet Take 40 mg by mouth daily. Yes Historical Provider   PSYLLIUM SEED, WITH DEXTROSE, (FIBER PO) Take  by mouth daily. Indications: 2 TBSP daily   Yes Historical Provider   polyethylene glycol (MIRALAX) 17 gram/dose powder Take 17 g by mouth daily. Yes Historical Provider   naproxen sodium (ALEVE) 220 mg tablet Take 220 mg by mouth two (2) times daily as needed. Yes Historical Provider   atenolol (TENORMIN) 50 mg tablet Take  by mouth daily. Yes Historical Provider   furosemide (LASIX) 80 mg tablet Take 120 mg by mouth daily. Yes Historical Provider   lisinopril (PRINIVIL, ZESTRIL) 40 mg tablet Take 1 Tab by mouth every morning. Hold until Blood pressure is greater than 120 mmHg. Have home health nursing check  Indications: HYPERTENSION 10/21/15  Yes Sandee Fabry, NP   metFORMIN (GLUCOPHAGE) 1,000 mg tablet Take 1,000 mg by mouth two (2) times daily (with meals). Yes Historical Provider   mometasone (ELOCON) 0.1 % topical cream Apply  to affected area two (2) times a day. Indications: SKIN RASH   Yes Historical Provider   Cholecalciferol, Vitamin D3, (VITAMIN D3) 1,000 unit cap Take 1 Cap by mouth two (2) times a day. Yes Historical Provider   fluticasone (FLONASE) 50 mcg/actuation nasal spray 2 Sprays nightly. Yes Historical Provider   aspirin (ASPIRIN) 325 mg tablet Take 325 mg by mouth daily. Yes Dolly Perrin MD   omega-3 acid ethyl esters (LOVAZA) 1 gram capsule Take 2 g by mouth two (2) times a day. Yes Dolly Perrin MD   nystatin (MYCOSTATIN) topical cream Apply  to affected area two (2) times a day. Yes Dolly Perrin MD   BLOOD SUGAR DIAGNOSTIC (ACCU-CHEK SHIV PLUS TEST STRP) by Does Not Apply route daily. Indications: 1 (one) strip daily    Historical Provider   multivitamin (MULTIPLE VITAMINS) tablet Take 1 Tab by mouth daily. Historical Provider     Physical Exam:   Vital Signs: Blood pressure 137/49, pulse (!) 50, temperature 97.9 °F (36.6 °C), resp. rate 17, height 6' 2\" (1.88 m), weight 105.8 kg (233 lb 5 oz), SpO2 99 %.   General: well developed, well nourished   HEENT: unremarkable   Heart: regular rhythm no mumur    Lungs: clear   Abdominal:  benign   Neurological: unremarkable   Extremities: no edema     Findings/Diagnosis: dysphagia  Plan of Care/Planned Procedure: EGd with dil with conscious/deep sedation    Signed:  Dariana Chavez MD 7/27/2017

## 2017-07-27 NOTE — ANESTHESIA PREPROCEDURE EVALUATION
Anesthetic History   No history of anesthetic complications            Review of Systems / Medical History  Patient summary reviewed, nursing notes reviewed and pertinent labs reviewed    Pulmonary    COPD      Smoker (79 pk yrs)         Neuro/Psych   Within defined limits           Cardiovascular    Hypertension        Dysrhythmias   PAD ( Carotid artery stenosis (I65.29)  S/P Left CEA )    Exercise tolerance: >4 METS  Comments: RBBB    EF normal ,trivial MR and trivial AI   GI/Hepatic/Renal     GERD    Renal disease: CRI  Hiatal hernia, PUD and liver disease     Endo/Other    Diabetes    Arthritis     Other Findings   Comments: DVT    Chronic back pain         Physical Exam    Airway  Mallampati: II  TM Distance: 4 - 6 cm  Neck ROM: normal range of motion   Mouth opening: Normal     Cardiovascular  Regular rate and rhythm,  S1 and S2 normal,  no murmur, click, rub, or gallop             Dental  No notable dental hx       Pulmonary  Breath sounds clear to auscultation               Abdominal  GI exam deferred       Other Findings            Anesthetic Plan    ASA: 3  Anesthesia type: MAC            Anesthetic plan and risks discussed with: Patient

## 2017-07-27 NOTE — IP AVS SNAPSHOT
[address] [facility] 
[phone] Patient: Jessie Fontenot MRN: [id number] PJL:[id number] You are allergic to the following Allergen Reactions Pcn (Penicillins) Swelling Swelling of legs & feet Oxycontin (Oxycodone) Other (comments) Agitation/felt irritable Ibuprofen Other (comments) \"Rage\" Niaspan (Niacin) Rash Vesicare (Solifenacin) Other (comments) Attributes: Abdominal pain Recent Documentation Height Weight BMI Smoking Status 1.88 m 105.8 kg 29.96 kg/m2 Former Smoker Emergency Contacts Name Discharge Info Relation Home Work Mobile Clyde Kocher  Child [2] [phone] About your hospitalization You were admitted on:  July 27, 2017 You last received care in the:  [facility] ENDOSCOPY You were discharged on:  July 27, 2017 Unit phone number:  [phone] Why you were hospitalized Your primary diagnosis was:  Not on File Providers Seen During Your Hospitalizations Provider Role Specialty Primary office phone Kyra Swan MD Attending Provider Gastroenterology [phone] Your Primary Care Physician (PCP) Primary Care Physician Office Phone Office Fax Roshan Raya [phone] Follow-up Information None Current Discharge Medication List  
  
START taking these medications Dose & Instructions Dispensing Information Comments Morning Noon Evening Bedtime  
 omeprazole 20 mg capsule Commonly known as:  PRILOSEC Your last dose was: Your next dose is:    
   
   
 Dose:  20 mg Take 1 Cap by mouth daily for 360 days. Quantity:  30 Cap Refills:  11 CONTINUE these medications which have NOT CHANGED Dose & Instructions Dispensing Information Comments Morning Noon Evening Bedtime ACCU-CHEK SHIV PLUS TEST STRP Your last dose was: Your next dose is:    
   
   
 by Does Not Apply route daily. Indications: 1 (one) strip daily Refills:  0 ALEVE 220 mg tablet Generic drug:  naproxen sodium Your last dose was: Your next dose is:    
   
   
 Dose:  220 mg Take 220 mg by mouth two (2) times daily as needed. Refills:  0  
     
   
   
   
  
 aspirin 325 mg tablet Commonly known as:  ASPIRIN Your last dose was: Your next dose is:    
   
   
 Dose:  325 mg Take 325 mg by mouth daily. Refills:  0  
     
   
   
   
  
 atenolol 50 mg tablet Commonly known as:  TENORMIN Your last dose was: Your next dose is: Take  by mouth daily. Refills:  0  
     
   
   
   
  
 atorvastatin 40 mg tablet Commonly known as:  LIPITOR Your last dose was: Your next dose is:    
   
   
 Dose:  40 mg Take 40 mg by mouth daily. Refills:  0  
     
   
   
   
  
 clotrimazole-betamethasone topical cream  
Commonly known as:  Caro Gull Your last dose was: Your next dose is:    
   
   
 Apply  to affected area two (2) times a day. Indications: as needed Refills:  0 FIBER PO Your last dose was: Your next dose is: Take  by mouth daily. Indications: 2 TBSP daily Refills:  0  
     
   
   
   
  
 FLONASE 50 mcg/actuation nasal spray Generic drug:  fluticasone Your last dose was: Your next dose is:    
   
   
 Dose:  2 Spray 2 Sprays nightly. Refills:  0  
     
   
   
   
  
 furosemide 80 mg tablet Commonly known as:  LASIX Your last dose was: Your next dose is:    
   
   
 Dose:  120 mg Take 120 mg by mouth daily. Refills:  0 LEVITRA 20 mg tablet Generic drug:  vardenafil Your last dose was: Your next dose is:    
   
   
 Dose:  20 mg Take 20 mg by mouth as needed. Refills:  0  
     
   
   
   
  
 lisinopril 40 mg tablet Commonly known as:  Jaylon Marilou Your last dose was: Your next dose is:    
   
   
 Dose:  40 mg Take 1 Tab by mouth every morning. Hold until Blood pressure is greater than 120 mmHg. Have home health nursing check  Indications: HYPERTENSION Refills:  0 LOVAZA 1 gram capsule Generic drug:  omega-3 acid ethyl esters Your last dose was: Your next dose is:    
   
   
 Dose:  2 g Take 2 g by mouth two (2) times a day. Refills:  0  
     
   
   
   
  
 metFORMIN 1,000 mg tablet Commonly known as:  GLUCOPHAGE Your last dose was: Your next dose is:    
   
   
 Dose:  1000 mg Take 1,000 mg by mouth two (2) times daily (with meals). Refills:  0 MIRALAX 17 gram/dose powder Generic drug:  polyethylene glycol Your last dose was: Your next dose is:    
   
   
 Dose:  17 g Take 17 g by mouth daily. Refills:  0  
     
   
   
   
  
 mometasone 0.1 % topical cream  
Commonly known as:  Emiliano Smith Your last dose was: Your next dose is:    
   
   
 Apply  to affected area two (2) times a day. Indications: SKIN RASH Refills:  0 MULTIPLE VITAMINS tablet Generic drug:  multivitamin Your last dose was: Your next dose is:    
   
   
 Dose:  1 Tab Take 1 Tab by mouth daily. Refills:  0  
     
   
   
   
  
 nystatin topical cream  
Commonly known as:  MYCOSTATIN Your last dose was: Your next dose is:    
   
   
 Apply  to affected area two (2) times a day. Refills:  0  
     
   
   
   
  
 VITAMIN D3 1,000 unit Cap Generic drug:  cholecalciferol Your last dose was: Your next dose is:    
   
   
 Dose:  1 Cap Take 1 Cap by mouth two (2) times a day. Refills:  0 Where to Get Your Medications Information on where to get these meds will be given to you by the nurse or doctor. ! Ask your nurse or doctor about these medications  
  omeprazole 20 mg capsule Discharge Instructions Sandy Wisdom 
[id number] 
1935 EGD DISCHARGE INSTRUCTIONS Discomfort: 
Sore throat- throat lozenges or warm salt water gargle 
redness at IV site- apply warm compress to area; if redness or soreness persist- contact your physician Gaseous discomfort- walking, belching will help relieve any discomfort You may not operate a vehicle for 12 hours You may not engage in an occupation involving machinery or appliances for rest of today You may not drink alcoholic beverages for at least 12 hours Avoid making any critical decisions for at least 24 hour DIET You may have minimal sips at this time-- do not eat or drink for two hours. You may eat and drink after 0945am today You may resume your regular diet  however -  remember your colon is empty and a heavy meal will produce gas. Avoid these foods:  vegetables, fried / greasy foods, carbonated drinks MEDICATIONS: 
 
 
 
ACTIVITY You may resume your normal daily activities until tomorrow AM; 
Spend the remainder of the day resting -  avoid any strenuous activity. CALL M.D. ANY SIGN OF Increasing pain, nausea, vomiting Abdominal distension (swelling) New increased bleeding (oral or rectal) Fever (chills) Pain in chest area Bloody discharge from nose or mouth Shortness of breath IMPRESSION: 
-Normal appearing distal esophageal mucosa; biopsied to exclude esophagitis 
-Esophageal dyskinesia; empiric dilation of distal esophagus completed using 20mm esophageal balloon held across gastroesophageal junction for 60 seconds 
-Multiple shallow antral erosions suggestive of aspirin or NSAID-induced gastropathy; biopsied 
-Duodenitis Follow-up Instructions: Call Dr. Lamberto Dimas for the results of procedure / biopsy in 7-10 days Telephone # [phone] Begin daily omeprazole as prescribed today Rossana Herndon MD 
 
 Flipzu Activation Thank you for requesting access to Flipzu. Please follow the instructions below to securely access and download your online medical record. Flipzu allows you to send messages to your doctor, view your test results, renew your prescriptions, schedule appointments, and more. How Do I Sign Up? 1. In your internet browser, go to [email or web address] 
2. Click on the First Time User? Click Here link in the Sign In box. You will be redirect to the New Member Sign Up page. 3. Enter your Flipzu Access Code exactly as it appears below. You will not need to use this code after youve completed the sign-up process. If you do not sign up before the expiration date, you must request a new code. Flipzu Access Code: [email or web address] Expires: 2017  2:09 PM (This is the date your Flipzu access code will ) 4. Enter the last four digits of your Social Security Number (xxxx) and Date of Birth (mm/dd/yyyy) as indicated and click Submit. You will be taken to the next sign-up page. 5. Create a Flipzu ID. This will be your Flipzu login ID and cannot be changed, so think of one that is secure and easy to remember. 6. Create a Flipzu password. You can change your password at any time. 7. Enter your Password Reset Question and Answer. This can be used at a later time if you forget your password. 8. Enter your e-mail address. You will receive e-mail notification when new information is available in [address] 9. Click Sign Up. You can now view and download portions of your medical record. 10. Click the Download Summary menu link to download a portable copy of your medical information. Additional Information If you have questions, please visit the Frequently Asked Questions section of the myContactCard website at [email or web address] Remember, MyChart is NOT to be used for urgent needs. For medical emergencies, dial 911. Discharge Orders None Introducing [facility] SERVICES! Lauraelizabeth Heredia introduces myContactCard patient portal. Now you can access parts of your medical record, email your doctor's office, and request medication refills online. 1. In your internet browser, go to [email or web address] 2. Click on the First Time User? Click Here link in the Sign In box. You will see the New Member Sign Up page. 3. Enter your myContactCard Access Code exactly as it appears below. You will not need to use this code after youve completed the sign-up process. If you do not sign up before the expiration date, you must request a new code. · myContactCard Access Code: [email or web address] Expires: 2017  2:09 PM 
 
4. Enter the last four digits of your Social Security Number (xxxx) and Date of Birth (mm/dd/yyyy) as indicated and click Submit. You will be taken to the next sign-up page. 5. Create a myContactCard ID. This will be your myContactCard login ID and cannot be changed, so think of one that is secure and easy to remember. 6. Create a myContactCard password. You can change your password at any time. 7. Enter your Password Reset Question and Answer. This can be used at a later time if you forget your password. 8. Enter your e-mail address. You will receive e-mail notification when new information is available in [address] 9. Click Sign Up. You can now view and download portions of your medical record. 10. Click the Download Summary menu link to download a portable copy of your medical information. If you have questions, please visit the Frequently Asked Questions section of the myContactCard website. Remember, myContactCard is NOT to be used for urgent needs. For medical emergencies, dial 911. Now available from your iPhone and Android! General Information Please provide this summary of care documentation to your next provider. Patient Signature:  ____________________________________________________________ Date:  ____________________________________________________________  
  
Thomas Livings Provider Signature:  ____________________________________________________________ Date:  ____________________________________________________________

## 2017-07-27 NOTE — ROUTINE PROCESS
Rambojosette Clipper  1935  [id number]    Situation:  Verbal report received from: Tim Crews RN  Procedure: Procedure(s):  ESOPHAGOGASTRODUODENOSCOPY (EGD)  ESOPHAGEAL DILATION  ESOPHAGOGASTRODUODENAL (EGD) BIOPSY    Background:    Preoperative diagnosis: DYSPHAGIA  Postoperative diagnosis: Esophageal Dyskinesia, Duodenitis, Multiple gastric erosions,esophagitis    :  Dr. Noah Granger  Assistant(s): Endoscopy Technician-1: Caleb Benitez  Endoscopy RN-1: Acosta Moses RN    Specimens:   ID Type Source Tests Collected by Time Destination   1 : Stomach biopsy Preservative   Yong Wynne MD 7/27/2017 6482 Pathology   2 : Distal esophagus biopsy Preservative Esophagus, Distal  Yong Wynne MD 7/27/2017 6005 Pathology     H. Pylori  no    Assessment:  Intra-procedure medications       Anesthesia gave intra-procedure sedation and medications, see anesthesia flow sheet     Intravenous fluids: NS@ KVO     Vital signs stable     Abdominal assessment: round and soft     Recommendation:  Discharge patient per MD order.   Family or Friend   Permission to share finding with family or friend yes

## 2017-07-27 NOTE — PROCEDURES
NAME:  Deana Vega   :   1935   MRN:   [id number]     Date/Time:  2017 9:24 AM    Esophagogastroduodenoscopy (EGD) Procedure Note    Procedure: Esophagogastroduodenoscopy with biopsy, esophageal dilation    Indication:  Dyphagia/odynophagia  Pre-operative Diagnosis: see indication above  Post-operative Diagnosis: see findings below  :  Trudy Hutchinson MD  Referring Provider:   Braden Cleveland MD    Exam:  Airway: clear, no airway problems anticipated  Heart: RRR, without gallops or rubs  Lungs: clear bilaterally without wheezes, crackles, or rhonchi  Abdomen: soft, nontender, nondistended, bowel sounds present  Mental Status: awake, alert and oriented to person, place and time     Anethesia/Sedation:  MAC anesthesia Propofol 160mg IV  Procedure Details   After informed consent was obtained for the procedure, with all risks and benefits of procedure explained the patient was taken to the endoscopy suite and placed in the left lateral decubitus position. Following sequential administration of sedation as per above, the WPTR532 gastroscope was inserted into the mouth and advanced under direct vision to second portion of the duodenum. A careful inspection was made as the gastroscope was withdrawn, including a retroflexed view of the proximal stomach; findings and interventions are described below. Findings:    -Normal appearing distal esophageal mucosa; biopsied to exclude esophagitis  -Esophageal dyskinesia; empiric dilation of distal esophagus completed using 20mm esophageal balloon held across gastroesophageal junction for 60 seconds  -Multiple shallow antral erosions suggestive of aspirin or NSAID-induced gastropathy; biopsied  -Duodenitis     Therapies:  esophageal dilation with 20mm sized balloon; biopsy of esophagus; biopsy of stomach   Specimens: #1 gastric; #2 distal; esophagus  EBL:  None. Complications:   None; patient tolerated the procedure well. Impression:    -Normal appearing distal esophageal mucosa; biopsied to exclude esophagitis  -Esophageal dyskinesia; empiric dilation of distal esophagus completed using 20mm esophageal balloon held across gastroesophageal junction for 60 seconds  -Multiple shallow antral erosions suggestive of aspirin or NSAID-induced gastropathy; biopsied  -Duodenitis     Recommendations:  -Acid suppression with a proton pump inhibitor. , -Await pathology. , -Follow symptoms.     Discharge disposition:  Home in the company of  when able to ambulate    Thais Brown MD

## 2017-07-27 NOTE — DISCHARGE INSTRUCTIONS
Max Blake  [id number]  1935    EGD DISCHARGE INSTRUCTIONS  Discomfort:  Sore throat- throat lozenges or warm salt water gargle  redness at IV site- apply warm compress to area; if redness or soreness persist- contact your physician  Gaseous discomfort- walking, belching will help relieve any discomfort  You may not operate a vehicle for 12 hours  You may not engage in an occupation involving machinery or appliances for rest of today  You may not drink alcoholic beverages for at least 12 hours  Avoid making any critical decisions for at least 24 hour  DIET  You may have minimal sips at this time-- do not eat or drink for two hours. You may eat and drink after 0945am today  You may resume your regular diet - however -  remember your colon is empty and a heavy meal will produce gas. Avoid these foods:  vegetables, fried / greasy foods, carbonated drinks    MEDICATIONS:        ACTIVITY  You may resume your normal daily activities until tomorrow AM;  Spend the remainder of the day resting -  avoid any strenuous activity. CALL M.D. ANY SIGN OF   Increasing pain, nausea, vomiting  Abdominal distension (swelling)  New increased bleeding (oral or rectal)  Fever (chills)  Pain in chest area  Bloody discharge from nose or mouth  Shortness of breath      IMPRESSION:  -Normal appearing distal esophageal mucosa; biopsied to exclude esophagitis  -Esophageal dyskinesia; empiric dilation of distal esophagus completed using 20mm esophageal balloon held across gastroesophageal junction for 60 seconds  -Multiple shallow antral erosions suggestive of aspirin or NSAID-induced gastropathy; biopsied  -Duodenitis     Follow-up Instructions:   Call Dr. Mccallum December for the results of procedure / biopsy in 7-10 days   Telephone # [phone]  Begin daily omeprazole as prescribed today    Jr Jack MD     MyChart Activation    Thank you for requesting access to [employer].  Please follow the instructions below to securely access and download your online medical record. Alleg[employer]ithmic allows you to send messages to your doctor, view your test results, renew your prescriptions, schedule appointments, and more. How Do I Sign Up? 1. In your internet browser, go to [email or web address]  2. Click on the First Time User? Click Here link in the Sign In box. You will be redirect to the New Member Sign Up page. 3. Enter your Allegorithmic Access Code exactly as it appears below. You will not need to use this code after youve completed the sign-up process. If you do not sign up before the expiration date, you must request a new code. Alleg[employer]ithmic Access Code: [email or web address]  Expires: 2017  2:09 PM (This is the date your Allegorithmic access code will )    4. Enter the last four digits of your Social Security Number (xxxx) and Date of Birth (mm/dd/yyyy) as indicated and click Submit. You will be taken to the next sign-up page. 5. Create a Allegorithmic ID. This will be your Allegorithmic login ID and cannot be changed, so think of one that is secure and easy to remember. 6. Create a Allegorithmic password. You can change your password at any time. 7. Enter your Password Reset Question and Answer. This can be used at a later time if you forget your password. 8. Enter your e-mail address. You will receive e-mail notification when new information is available in [address] 9. Click Sign Up. You can now view and download portions of your medical record. 10. Click the Download Summary menu link to download a portable copy of your medical information. Additional Information    If you have questions, please visit the Frequently Asked Questions section of the Allegorithmic website at [email or web address] Remember, Allegorithmic is NOT to be used for urgent needs. For medical emergencies, dial 911.

## 2017-07-27 NOTE — PROGRESS NOTES
Patient informed that   Blood sugar, liver and kidney function normal.  A1C excellent at 6.2. Lipids excellent. LDL 76.

## 2017-09-11 RX ORDER — ATENOLOL 50 MG/1
TABLET ORAL
Qty: 90 TAB | Refills: 3 | Status: SHIPPED | OUTPATIENT
Start: 2017-09-11 | End: 2018-07-26 | Stop reason: SDUPTHER

## 2017-09-25 RX ORDER — METFORMIN HYDROCHLORIDE 500 MG/1
TABLET, EXTENDED RELEASE ORAL
Qty: 360 TAB | Refills: 3 | Status: SHIPPED | OUTPATIENT
Start: 2017-09-25 | End: 2018-09-20 | Stop reason: SDUPTHER

## 2017-10-13 RX ORDER — OMEGA-3-ACID ETHYL ESTERS 1 G/1
CAPSULE, LIQUID FILLED ORAL
Qty: 360 CAP | Refills: 3 | Status: SHIPPED | OUTPATIENT
Start: 2017-10-13 | End: 2018-10-08 | Stop reason: SDUPTHER

## 2017-10-29 PROBLEM — N32.81 OAB (OVERACTIVE BLADDER): Status: ACTIVE | Noted: 2017-10-29

## 2017-10-29 PROBLEM — N40.0 BPH (BENIGN PROSTATIC HYPERPLASIA): Status: ACTIVE | Noted: 2017-10-29

## 2017-10-29 PROBLEM — Z86.718 HISTORY OF DVT (DEEP VEIN THROMBOSIS): Status: ACTIVE | Noted: 2017-10-29

## 2017-10-29 PROBLEM — D69.6 THROMBOCYTOPENIA (HCC): Status: ACTIVE | Noted: 2017-10-29

## 2017-10-29 PROBLEM — K76.0 FATTY LIVER: Status: ACTIVE | Noted: 2017-10-29

## 2017-10-29 PROBLEM — I67.9 CVD (CEREBROVASCULAR DISEASE): Status: ACTIVE | Noted: 2017-10-29

## 2017-10-29 PROBLEM — K21.00 GASTROESOPHAGEAL REFLUX DISEASE WITH ESOPHAGITIS: Status: ACTIVE | Noted: 2017-10-29

## 2017-10-29 PROBLEM — Z00.00 PE (PHYSICAL EXAM), ANNUAL: Status: ACTIVE | Noted: 2017-10-29

## 2017-10-29 PROBLEM — I87.2 VENOUS INSUFFICIENCY OF BOTH LOWER EXTREMITIES: Status: ACTIVE | Noted: 2017-07-26

## 2017-10-29 PROBLEM — E55.9 VITAMIN D DEFICIENCY: Status: ACTIVE | Noted: 2017-10-29

## 2017-10-31 ENCOUNTER — OFFICE VISIT (OUTPATIENT)
Dept: INTERNAL MEDICINE CLINIC | Age: 82
End: 2017-10-31

## 2017-10-31 VITALS
TEMPERATURE: 97.8 F | HEIGHT: 74 IN | HEART RATE: 57 BPM | WEIGHT: 233 LBS | OXYGEN SATURATION: 96 % | SYSTOLIC BLOOD PRESSURE: 162 MMHG | DIASTOLIC BLOOD PRESSURE: 63 MMHG | RESPIRATION RATE: 16 BRPM | BODY MASS INDEX: 29.9 KG/M2

## 2017-10-31 DIAGNOSIS — D69.6 THROMBOCYTOPENIA (HCC): ICD-10-CM

## 2017-10-31 DIAGNOSIS — M48.062 SPINAL STENOSIS OF LUMBAR REGION WITH NEUROGENIC CLAUDICATION: ICD-10-CM

## 2017-10-31 DIAGNOSIS — L97.819 VENOUS STASIS ULCER OF OTHER PART OF RIGHT LOWER LEG WITH VARICOSE VEINS, UNSPECIFIED ULCER STAGE (HCC): ICD-10-CM

## 2017-10-31 DIAGNOSIS — E78.2 HYPERLIPIDEMIA, MIXED: ICD-10-CM

## 2017-10-31 DIAGNOSIS — R29.6 FREQUENT FALLS: ICD-10-CM

## 2017-10-31 DIAGNOSIS — Z23 ENCOUNTER FOR IMMUNIZATION: ICD-10-CM

## 2017-10-31 DIAGNOSIS — E11.40 CONTROLLED TYPE 2 DIABETES MELLITUS WITH DIABETIC NEUROPATHY, WITHOUT LONG-TERM CURRENT USE OF INSULIN (HCC): Primary | ICD-10-CM

## 2017-10-31 DIAGNOSIS — R29.898 WEAKNESS OF BOTH LOWER EXTREMITIES: ICD-10-CM

## 2017-10-31 DIAGNOSIS — I83.018 VENOUS STASIS ULCER OF OTHER PART OF RIGHT LOWER LEG WITH VARICOSE VEINS, UNSPECIFIED ULCER STAGE (HCC): ICD-10-CM

## 2017-10-31 DIAGNOSIS — I10 ESSENTIAL HYPERTENSION, BENIGN: ICD-10-CM

## 2017-10-31 RX ORDER — AMLODIPINE BESYLATE 5 MG/1
5 TABLET ORAL DAILY
Qty: 40 TAB | Refills: 0 | Status: SHIPPED | OUTPATIENT
Start: 2017-10-31 | End: 2018-01-18 | Stop reason: SDUPTHER

## 2017-10-31 NOTE — PROGRESS NOTES
Subjective:   Lela Jolly is a [de-identified] y.o. male      Chief Complaint   Patient presents with    Blood Pressure Check    Labs        History of present illness:  Mr. Benny Ureña returns with several problems. He's had problems with increasing lower extremity weakness, but has been very sedentary again. I've referred him back to physical therapy as I feel there is little else that can be done for that. He was reluctant to even do that, but I pointed out to him that if he continues to get increasingly weak and has more frequent falls he is eventually going to break something and that will spell disaster for him. Also since last visit he developed a large blister over the lateral aspect of his right lower leg. He treated it locally with cleansing and antibiotic ointment and it has reduced in size to an approximately 2.5 cm, very superficial, stasis ulcer. The base is very clean and red with good granulation tissue and I expect this to heal with simple cleansing and protection with a dry sterile dressing. Additionally his blood pressure is elevated today. He's not changed any medications, but he clearly needs additional medication. He is also returning in follow up relative to his diabetes and hyperlipidemia. Will check all the labs necessary for that. His medications are reviewed and are stable, but we are adding Amlodipine for his blood pressure. We will ask him to recheck here for his blood pressure in a month's time and we will re-look at the wound on his leg. He'll let us know in the meantime if it gets any worse. Also in the meantime he needs to pursue physical therapy in the [facility], where he lives, and we have given him a referral for that. He's not experienced any chest pain, orthopnea, PND or symptoms of congestive heart failure. He denies any current GI symptoms.   The loose stools and near fecal incontinence that he was complaining of previously have resolved and his bowel habits are back to normal.  He did see Dr. Yasmine Mcwilliams after his visit here because of difficulties with strangling with thin liquids. I suggested he use a straw. Dr. Yasmine Mcwilliams did not find any abnormalities with a modified barium swallow and upper endoscopy, but did place him on PPI. I don't think he needs colonoscopy as it has only been three years since his last colonoscopy. He denies current  symptoms. No other new issues were identified today.         Patient Active Problem List    Diagnosis Date Noted    Spinal stenosis 10/20/2015     Priority: 1 - One    Frequent falls 03/13/2015     Priority: 1 - One    Lower extremity weakness 03/13/2015     Priority: 1 - One    Lumbar spinal stenosis 03/13/2015     Priority: 1 - One    Diabetes mellitus type 2, controlled ([address] 06/27/2012     Priority: 1 - One    Essential hypertension, benign 06/27/2012     Priority: 1 - One    Hyperlipidemia, mixed 06/27/2012     Priority: 1 - One    CVD (cerebrovascular disease) 10/29/2017     Priority: 2 - Two    Proteinuria 07/26/2017     Priority: 2 - Two    Hiatal hernia 07/26/2017     Priority: 2 - Two    Dysphagia 07/26/2017     Priority: 2 - Two    Risk for falls 07/26/2017     Priority: 2 - Two    Heart valve disorder 07/26/2017     Priority: 2 - Two    LBP (low back pain) 03/13/2015     Priority: 2 - Two    BPH (benign prostatic hyperplasia) 10/29/2017     Priority: 3 - Three    Fatty liver 10/29/2017     Priority: 3 - Three    Gastroesophageal reflux disease with esophagitis 10/29/2017     Priority: 3 - Three    Thrombocytopenia ([address] 10/29/2017     Priority: 3 - Three    Venous stasis ulcers ([address] 07/26/2017     Priority: 3 - Three    Fecal soiling 07/26/2017     Priority: 3 - Three    Venous insufficiency of both lower extremities 07/26/2017     Priority: 3 - Three    Incomplete right bundle branch block 07/26/2017     Priority: 3 - Three    Chronic back pain 07/26/2017     Priority: 3 - Three    CKD (chronic kidney disease) stage 2, GFR 60-89 ml/min 10/21/2015     Priority: 3 - Three    COPD (chronic obstructive pulmonary disease) ([facility] [address] 06/27/2012     Priority: 3 - Three    OAB (overactive bladder) 10/29/2017     Priority: 4 - Four    Colon polyp 07/26/2017     Priority: 4 - Four    Compression fracture of L2 lumbar vertebra ([facility] [address] 07/26/2017     Priority: 4 - Four    Right shoulder pain 07/26/2017     Priority: 4 - Four    Trigger middle finger of left hand 07/26/2017     Priority: 4 - Four    Allergic rhinitis 07/26/2017     Priority: 4 - Four    Tardy palsy of left ulnar nerve 07/26/2017     Priority: 4 - Four    Syncope 03/12/2015     Priority: 4 - Four    DJD (degenerative joint disease) of knee 06/27/2012     Priority: 4 - Four    History of DVT (deep vein thrombosis) 10/29/2017     Priority: 5 - Five    Vitamin D deficiency 10/29/2017     Priority: 5 - Five    Umbilical hernia 90/76/0178     Priority: 5 - Five    ED (erectile dysfunction) 07/26/2017     Priority: 5 - Five    Hearing loss 07/26/2017     Priority: 5 - Five    PE (physical exam), annual 10/29/2017      Past Surgical History:   Procedure Laterality Date    COLONOSCOPY,DIAGNOSTIC  12/4/2014         HX BACK SURGERY  2015    bone spurs removed from lumbar spine (per pt)    HX CAROTID ENDARTERECTOMY      left    HX CATARACT REMOVAL      bilateral lens implant bilateral    HX KNEE REPLACEMENT      bilateral    HX ORTHOPAEDIC      foreign body removal-nail   right leg    RI EGD TRANSORAL BIOPSY SINGLE/MULTIPLE  1/31/2013         TOTAL KNEE ARTHROPLASTY      bilateral    UPPER GI ENDOSCOPY,BALL DIL,30MM  7/27/2017         UPPER GI ENDOSCOPY,BIOPSY  7/27/2017           Allergies   Allergen Reactions    Pcn [Penicillins] Swelling     Swelling of legs & feet    Oxycontin [Oxycodone] Other (comments)     Agitation/felt irritable    Ibuprofen Other (comments)     \"Rage\"    Niaspan [Niacin] Rash    Vesicare [Solifenacin] Other (comments) Attributes: Abdominal pain      Family History   Problem Relation Age of Onset    Cancer Mother     Hypertension Mother     Heart Disease Brother     Hypertension Brother     Hypertension Sister     Hypertension Sister       Social History     Social History    Marital status:      Spouse name: N/A    Number of children: N/A    Years of education: N/A     Occupational History    Not on file. Social History Main Topics    Smoking status: Former Smoker     Packs/day: 2.00     Years: 35.00     Quit date: 6/19/1998    Smokeless tobacco: Never Used    Alcohol use No      Comment: rarely    Drug use: No    Sexual activity: Not on file     Other Topics Concern    Not on file     Social History Narrative     Outpatient Prescriptions Marked as Taking for the 10/31/17 encounter (Office Visit) with Ana Buenrostro MD   Medication Sig Dispense Refill    BLOOD-GLUCOSE METER (FREESTYLE LITE METER) by Does Not Apply route.  amLODIPine (NORVASC) 5 mg tablet Take 1 Tab by mouth daily. 40 Tab 0    omega-3 acid ethyl esters (LOVAZA) 1 gram capsule TAKE 2 CAPSULES TWICE A  Cap 3    metFORMIN ER (GLUCOPHAGE XR) 500 mg tablet TAKE 2 TABLETS TWICE A  Tab 3    atenolol (TENORMIN) 50 mg tablet TAKE 1 TABLET DAILY 90 Tab 3    omeprazole (PRILOSEC) 20 mg capsule Take 1 Cap by mouth daily for 360 days. 30 Cap 11    vardenafil (LEVITRA) 20 mg tablet Take 20 mg by mouth as needed.  clotrimazole-betamethasone (LOTRISONE) topical cream Apply  to affected area two (2) times a day. Anal irritation  Indications: as needed      atorvastatin (LIPITOR) 40 mg tablet Take 40 mg by mouth daily.  PSYLLIUM SEED, WITH DEXTROSE, (FIBER PO) Take  by mouth daily. Indications: 2 TBSP daily      polyethylene glycol (MIRALAX) 17 gram/dose powder Take 17 g by mouth daily as needed.  naproxen sodium (ALEVE) 220 mg tablet Take 220 mg by mouth two (2) times daily as needed.       furosemide (LASIX) 80 mg tablet Take 120 mg by mouth daily.  lisinopril (PRINIVIL, ZESTRIL) 40 mg tablet Take 1 Tab by mouth every morning. Hold until Blood pressure is greater than 120 mmHg. Have home health nursing check  Indications: HYPERTENSION      mometasone (ELOCON) 0.1 % topical cream Apply  to affected area two (2) times a day. Indications: SKIN RASH      Cholecalciferol, Vitamin D3, (VITAMIN D3) 1,000 unit cap Take 1 Cap by mouth two (2) times a day.  multivitamin (MULTIPLE VITAMINS) tablet Take 1 Tab by mouth daily.  fluticasone (FLONASE) 50 mcg/actuation nasal spray 2 Sprays nightly.  aspirin (ASPIRIN) 325 mg tablet Take 325 mg by mouth daily.  nystatin (MYCOSTATIN) topical cream Apply  to affected area two (2) times a day. feet          Review of Systems              Constitutional:  He denies fever, weight loss, sweats or fatigue. HEENT:  No blurred or double vision, headache or dizziness. No difficulty with swallowing, taste, speech or smell. Respiratory:  No cough, wheezing or shortness of breath. No sputum production. Cardiac:  Denies chest pain, palpitations, unexplained indigestion, syncope, edema, PND or orthopnea. GI:  No changes in bowel movements, no abdominal pain, no bloating, anorexia, nausea, vomiting or heartburn. :  No frequency or dysuria. Denies incontinence. Extremities:  No joint pain, stiffness or swelling. Skin:  Stasis ulcer right leg. Neurological:  Lower extremity weakness and some numbness    Objective:     Vitals:    10/31/17 0909   BP: 162/63   Pulse: (!) 57   Resp: 16   Temp: 97.8 °F (36.6 °C)   TempSrc: Oral   SpO2: 96%   Weight: 233 lb (105.7 kg)   Height: 6' 2\" (1.88 m)   PainSc:   0 - No pain       Body mass index is 29.92 kg/(m^2). Physical Examination:              General Appearance:  Well-developed, well-nourished, no acute  distress. HEENT:      Ears:  The TMs and ear canals were clear.   Eyes:  The pupillary responses were normal.  Extraocular muscle function intact. Lids and conjunctiva not injected. Neck:  Supple without thyromegaly or adenopathy. No JVD noted. Lungs:  Clear to auscultation and percussion. Cardiac:  Regular rate and rhythm without murmur. GI: nontender w/o mass. Normal BS's. Extremities:  1-2 + edema bilaterally with stasis changes. 2.5 cm superficial stasis ulcer right lateral lower leg. Debri scrubbed away and good granulation base noted  Skin:  No rash or unusual mole changes noted. Neurological:  4+/5 lower extremity weaknessl. Assessment/Plan:   Impressions:      ICD-10-CM ICD-9-CM    1. Controlled type 2 diabetes mellitus with diabetic neuropathy, without long-term current use of insulin ([facility]) E11.40 250.60 AMB POC COMPREHENSIVE METABOLIC PANEL     262.2 AMB POC HEMOGLOBIN A1C   2. Thrombocytopenia ([facility]) D69.6 287.5 AMB POC COMPLETE CBC,AUTOMATED ENTER   3. Venous stasis ulcer of other part of right lower leg with varicose veins, unspecified ulcer stage ([facility] [address] I83.018 454.0     L97.819     4. Spinal stenosis of lumbar region with neurogenic claudication M48.062 724.03 REFERRAL TO PHYSICAL THERAPY   5. Frequent falls R29.6 V15.88 REFERRAL TO PHYSICAL THERAPY   6. Weakness of both lower extremities R29.898 729.89 REFERRAL TO PHYSICAL THERAPY   7. Essential hypertension, benign I10 401.1 AMB POC COMPREHENSIVE METABOLIC PANEL      amLODIPine (NORVASC) 5 mg tablet   8. Hyperlipidemia, mixed E78.2 272.2 AMB POC COMPREHENSIVE METABOLIC PANEL      AMB POC LIPID PROFILE   9. Encounter for immunization Z23 V03.89 INFLUENZA VIRUS VACCINE, HIGH DOSE SEASONAL, PRESERVATIVE FREE      ADMIN INFLUENZA VIRUS VAC        Plan:  1. Continue present meds  2. Lifestyle modifications including Na restriction, low carb/fat diet, weight reduction and exercise (at least a walking program). 3. Clean stasis ulcer right leg with H2O2 and apply DSD  4. Referred for PT  5.  Amlodipine added for BP        Follow-up Disposition:  Return in about 4 weeks (around 11/28/2017) for BP check. Orders Placed This Encounter    Influenza virus vaccine ([address]) 72 years and older ([address]87)    REFERRAL TO PHYSICAL THERAPY     Referral Priority:   Routine     Referral Type:   PT/OT/ST     Referral Reason:   Specialty Services Required    AMB POC COMPLETE CBC,AUTOMATED ENTER    AMB POC COMPREHENSIVE METABOLIC PANEL    AMB POC HEMOGLOBIN A1C    AMB POC LIPID PROFILE    Administration fee () for Medicare insured patients    amLODIPine (NORVASC) 5 mg tablet     Sig: Take 1 Tab by mouth daily.      Dispense:  40 Tab     Refill:  0       Mehreen Huffman MD

## 2017-10-31 NOTE — PROGRESS NOTES
Annita Morin is a [de-identified] y.o. male      Chief Complaint   Patient presents with    Blood Pressure Check    Labs         1. Have you been to the ER, urgent care clinic since your last visit? Hospitalized since your last visit? No    2. Have you seen or consulted any other health care providers outside of the [address] [place] [address] since your last visit? Include any pap smears or colon screening.  No

## 2017-11-01 LAB
ALBUMIN SERPL-MCNC: 3.9 G/DL (ref 3.9–5.4)
ALKALINE PHOS POC: 72 U/L (ref 38–126)
ALT SERPL-CCNC: 33 U/L (ref 9–52)
AST SERPL-CCNC: 28 U/L (ref 14–36)
BUN BLD-MCNC: 36 MG/DL (ref 9–20)
CALCIUM BLD-MCNC: 9.7 MG/DL (ref 8.4–10.2)
CHLORIDE BLD-SCNC: 107 MMOL/L (ref 98–107)
CHOLEST SERPL-MCNC: 125 MG/DL (ref 0–200)
CO2 POC: 26 MMOL/L (ref 22–32)
CREAT BLD-MCNC: 0.5 MG/DL (ref 0.8–1.5)
EGFR (POC): 102.3
GLUCOSE POC: 106 MG/DL (ref 75–110)
GRAN# POC: 4.7 K/UL (ref 2–7.8)
GRAN% POC: 70.3 % (ref 37–92)
HBA1C MFR BLD HPLC: 6.6 % (ref 4.5–5.7)
HCT VFR BLD CALC: 39 % (ref 37–51)
HDLC SERPL-MCNC: 30 MG/DL (ref 35–130)
HGB BLD-MCNC: 13.1 G/DL (ref 12–18)
LDL CHOLESTEROL POC: 71 MG/DL (ref 0–130)
LY# POC: 1.6 K/UL (ref 0.6–4.1)
LY% POC: 25.2 % (ref 10–58.5)
MCH RBC QN: 30.1 PG (ref 26–32)
MCHC RBC-ENTMCNC: 33.6 G/DL (ref 30–36)
MCV RBC: 90 FL (ref 80–97)
MID #, POC: 0.2 K/UL (ref 0–1.8)
MID% POC: 4.5 % (ref 0.1–24)
PLATELET # BLD: 161 K/UL (ref 140–440)
POTASSIUM SERPL-SCNC: 4.4 MMOL/L (ref 3.6–5)
PROT SERPL-MCNC: 6.8 G/DL (ref 6.3–8.2)
RBC # BLD: 4.35 M/UL (ref 4.2–6.3)
SODIUM SERPL-SCNC: 146 MMOL/L (ref 137–145)
TCHOL/HDL RATIO (POC): 4.2 (ref 0–4)
TOTAL BILIRUBIN POC: 0.6 MG/DL (ref 0.2–1.3)
TRIGL SERPL-MCNC: 120 MG/DL (ref 0–200)
VLDLC SERPL CALC-MCNC: 24 MG/DL
WBC # BLD: 6.5 K/UL (ref 4.1–10.9)

## 2017-11-01 NOTE — PROGRESS NOTES
Blood sugar, liver and kidney function normal.  A1C excellent at  6.6. Blood sugar, liver and kidney function normal.  Lipids excellent. LDL 71. No change in Rx.

## 2017-11-02 NOTE — PROGRESS NOTES
Patient informed that   Blood sugar, liver and kidney function normal.  A1C excellent at  6.6. Blood sugar, liver and kidney function normal.  Lipids excellent. LDL 71. No change in Rx.

## 2017-11-29 ENCOUNTER — OFFICE VISIT (OUTPATIENT)
Dept: INTERNAL MEDICINE CLINIC | Age: 82
End: 2017-11-29

## 2017-11-29 VITALS
HEIGHT: 74 IN | SYSTOLIC BLOOD PRESSURE: 140 MMHG | DIASTOLIC BLOOD PRESSURE: 70 MMHG | OXYGEN SATURATION: 95 % | TEMPERATURE: 98.2 F | HEART RATE: 62 BPM | BODY MASS INDEX: 30.83 KG/M2 | WEIGHT: 240.2 LBS

## 2017-11-29 DIAGNOSIS — L97.819 VENOUS STASIS ULCER OF OTHER PART OF RIGHT LOWER LEG WITH VARICOSE VEINS, UNSPECIFIED ULCER STAGE (HCC): ICD-10-CM

## 2017-11-29 DIAGNOSIS — I87.2 VENOUS INSUFFICIENCY OF BOTH LOWER EXTREMITIES: ICD-10-CM

## 2017-11-29 DIAGNOSIS — Z91.81 RISK FOR FALLS: ICD-10-CM

## 2017-11-29 DIAGNOSIS — R29.898 WEAKNESS OF BOTH LOWER EXTREMITIES: ICD-10-CM

## 2017-11-29 DIAGNOSIS — E11.40 CONTROLLED TYPE 2 DIABETES MELLITUS WITH DIABETIC NEUROPATHY, WITHOUT LONG-TERM CURRENT USE OF INSULIN (HCC): ICD-10-CM

## 2017-11-29 DIAGNOSIS — I10 ESSENTIAL HYPERTENSION, BENIGN: Primary | ICD-10-CM

## 2017-11-29 DIAGNOSIS — I83.018 VENOUS STASIS ULCER OF OTHER PART OF RIGHT LOWER LEG WITH VARICOSE VEINS, UNSPECIFIED ULCER STAGE (HCC): ICD-10-CM

## 2017-11-29 DIAGNOSIS — M48.062 SPINAL STENOSIS OF LUMBAR REGION WITH NEUROGENIC CLAUDICATION: ICD-10-CM

## 2017-11-29 RX ORDER — HYDRALAZINE HYDROCHLORIDE 25 MG/1
25 TABLET, FILM COATED ORAL 2 TIMES DAILY
Qty: 60 TAB | Refills: 3 | Status: SHIPPED | OUTPATIENT
Start: 2017-11-29 | End: 2018-01-03 | Stop reason: SDUPTHER

## 2017-11-29 NOTE — PROGRESS NOTES
Subjective:   Albertine Dakin is a [de-identified] y.o. male      Chief Complaint   Patient presents with    Hypertension     fu         History of present illness:  Mr. Seven Alcantar returns in follow up. He was supposed to be returning primarily for a blood pressure check. He was also returning to see whether he had any improvement in his overall weakness related to his physical therapy. He has undergone physical therapy. Unfortunately he has also had two falls since his last visit here, one occurred when he misstepped going over the threshold of his house and fell backwards, hitting his head. He didn't suffer any significant trauma and did not have any loss of consciousness. The other episode occurred as he was straining to get up from the commode and it sounds as though he had some vagal issues. He did fall in the bathroom and required other people to get him up. This occurred two weeks ago and he's had no similar symptoms since. When he fell going across his threshold he did fall onto some concrete and did suffer some scrapes of his left arm. He has since developed almost an eczematoid reaction to this and I have suggested he use his Elocon cream on his arm as well. His lower extremities still are moderately edematous and he still is having some intermittent blisters forming, which break down and weep to some degree. He's had some erythema, but no fevers, chills or obvious cellulitis. After a long discussion we decided to increase the physical therapy that he is getting and see how he does over the next month's time. If he doesn't improve I suggested he needs to see the neurologist to make sure there is nothing else we are missing. In addition, relative to his venous insufficiency and lower extremity edema, I have asked him to stop the Amlodipine as this could be a contributing factor. I am reluctant to increase his Lasix at this point.   We will add Hydralazine for his blood pressure in place of the Amlodipine, however. We will see where his blood pressure and all the other issues  a month's time again.         Patient Active Problem List    Diagnosis Date Noted    Spinal stenosis 10/20/2015     Priority: 1 - One    Frequent falls 03/13/2015     Priority: 1 - One    Lower extremity weakness 03/13/2015     Priority: 1 - One    Lumbar spinal stenosis 03/13/2015     Priority: 1 - One    Diabetes mellitus type 2, controlled ([address] 06/27/2012     Priority: 1 - One    Essential hypertension, benign 06/27/2012     Priority: 1 - One    Hyperlipidemia, mixed 06/27/2012     Priority: 1 - One    CVD (cerebrovascular disease) 10/29/2017     Priority: 2 - Two    Proteinuria 07/26/2017     Priority: 2 - Two    Hiatal hernia 07/26/2017     Priority: 2 - Two    Dysphagia 07/26/2017     Priority: 2 - Two    Risk for falls 07/26/2017     Priority: 2 - Two    Heart valve disorder 07/26/2017     Priority: 2 - Two    LBP (low back pain) 03/13/2015     Priority: 2 - Two    BPH (benign prostatic hyperplasia) 10/29/2017     Priority: 3 - Three    Fatty liver 10/29/2017     Priority: 3 - Three    Gastroesophageal reflux disease with esophagitis 10/29/2017     Priority: 3 - Three    Thrombocytopenia ([address] 10/29/2017     Priority: 3 - Three    Venous stasis ulcers ([address] 07/26/2017     Priority: 3 - Three    Fecal soiling 07/26/2017     Priority: 3 - Three    Venous insufficiency of both lower extremities 07/26/2017     Priority: 3 - Three    Incomplete right bundle branch block 07/26/2017     Priority: 3 - Three    Chronic back pain 07/26/2017     Priority: 3 - Three    CKD (chronic kidney disease) stage 2, GFR 60-89 ml/min 10/21/2015     Priority: 3 - Three    COPD (chronic obstructive pulmonary disease) ([address] 06/27/2012     Priority: 3 - Three    OAB (overactive bladder) 10/29/2017     Priority: 4 - Four    Colon polyp 07/26/2017     Priority: 4 - Four    Compression fracture of L2 lumbar vertebra ([address] 07/26/2017     Priority: 4 - Four    Right shoulder pain 07/26/2017     Priority: 4 - Four    Trigger middle finger of left hand 07/26/2017     Priority: 4 - Four    Allergic rhinitis 07/26/2017     Priority: 4 - Four    Tardy palsy of left ulnar nerve 07/26/2017     Priority: 4 - Four    Syncope 03/12/2015     Priority: 4 - Four    DJD (degenerative joint disease) of knee 06/27/2012     Priority: 4 - Four    History of DVT (deep vein thrombosis) 10/29/2017     Priority: 5 - Five    Vitamin D deficiency 10/29/2017     Priority: 5 - Five    Umbilical hernia 52/53/4843     Priority: 5 - Five    ED (erectile dysfunction) 07/26/2017     Priority: 5 - Five    Hearing loss 07/26/2017     Priority: 5 - Five    PE (physical exam), annual 10/29/2017      Past Surgical History:   Procedure Laterality Date    COLONOSCOPY,DIAGNOSTIC  12/4/2014         HX BACK SURGERY  2015    bone spurs removed from lumbar spine (per pt)    HX CAROTID ENDARTERECTOMY      left    HX CATARACT REMOVAL      bilateral lens implant bilateral    HX KNEE REPLACEMENT      bilateral    HX ORTHOPAEDIC      foreign body removal-nail   right leg    [facility] EGD TRANSORAL BIOPSY SINGLE/MULTIPLE  1/31/2013         TOTAL KNEE ARTHROPLASTY      bilateral    UPPER GI ENDOSCOPY,BALL DIL,30MM  7/27/2017         UPPER GI ENDOSCOPY,BIOPSY  7/27/2017           Allergies   Allergen Reactions    Pcn [Penicillins] Swelling     Swelling of legs & feet    Oxycontin [Oxycodone] Other (comments)     Agitation/felt irritable    Ibuprofen Other (comments)     \"Rage\"    Niaspan [Niacin] Rash    Vesicare [Solifenacin] Other (comments)     Attributes: Abdominal pain      Family History   Problem Relation Age of Onset    Cancer Mother     Hypertension Mother     Heart Disease Brother     Hypertension Brother     Hypertension Sister     Hypertension Sister       Social History     Social History    Marital status:      Spouse name: N/A    Number of children: N/A    Years of education: N/A     Occupational History    Not on file. Social History Main Topics    Smoking status: Former Smoker     Packs/day: 2.00     Years: 35.00     Quit date: 6/19/1998    Smokeless tobacco: Never Used    Alcohol use No      Comment: rarely    Drug use: No    Sexual activity: Not on file     Other Topics Concern    Not on file     Social History Narrative     Outpatient Prescriptions Marked as Taking for the 11/29/17 encounter (Office Visit) with Mehreen Huffman MD   Medication Sig Dispense Refill    hydrALAZINE (APRESOLINE) 25 mg tablet Take 1 Tab by mouth two (2) times a day. 60 Tab 3    BLOOD-GLUCOSE METER (FREESTYLE LITE METER) by Does Not Apply route.  amLODIPine (NORVASC) 5 mg tablet Take 1 Tab by mouth daily. 40 Tab 0    omega-3 acid ethyl esters (LOVAZA) 1 gram capsule TAKE 2 CAPSULES TWICE A  Cap 3    metFORMIN ER (GLUCOPHAGE XR) 500 mg tablet TAKE 2 TABLETS TWICE A  Tab 3    atenolol (TENORMIN) 50 mg tablet TAKE 1 TABLET DAILY 90 Tab 3    omeprazole (PRILOSEC) 20 mg capsule Take 1 Cap by mouth daily for 360 days. 30 Cap 11    vardenafil (LEVITRA) 20 mg tablet Take 20 mg by mouth as needed.  clotrimazole-betamethasone (LOTRISONE) topical cream Apply  to affected area two (2) times a day. Anal irritation  Indications: as needed      atorvastatin (LIPITOR) 40 mg tablet Take 40 mg by mouth daily.  PSYLLIUM SEED, WITH DEXTROSE, (FIBER PO) Take  by mouth daily. Indications: 2 TBSP daily      polyethylene glycol (MIRALAX) 17 gram/dose powder Take 17 g by mouth daily as needed.  naproxen sodium (ALEVE) 220 mg tablet Take 220 mg by mouth two (2) times daily as needed.  furosemide (LASIX) 80 mg tablet Take 120 mg by mouth daily.  lisinopril (PRINIVIL, ZESTRIL) 40 mg tablet Take 1 Tab by mouth every morning. Hold until Blood pressure is greater than 120 mmHg.   Have home health nursing check  Indications: HYPERTENSION      mometasone (ELOCON) 0.1 % topical cream Apply  to affected area two (2) times a day. Indications: SKIN RASH      Cholecalciferol, Vitamin D3, (VITAMIN D3) 1,000 unit cap Take 1 Cap by mouth two (2) times a day.  multivitamin (MULTIPLE VITAMINS) tablet Take 1 Tab by mouth daily.  fluticasone (FLONASE) 50 mcg/actuation nasal spray 2 Sprays nightly.  aspirin (ASPIRIN) 325 mg tablet Take 325 mg by mouth daily.  nystatin (MYCOSTATIN) topical cream Apply  to affected area two (2) times a day. feet          Review of Systems              Constitutional:  He denies fever, weight loss, sweats or fatigue. HEENT:  No blurred or double vision, headache or dizziness. No difficulty with swallowing, taste, speech or smell. Respiratory:  No cough, wheezing or shortness of breath. No sputum production. Cardiac:  Denies chest pain, palpitations, unexplained indigestion, syncope, edema, PND or orthopnea. GI:  No changes in bowel movements, no abdominal pain, no bloating, anorexia, nausea, vomiting or heartburn. :  No frequency or dysuria. Denies incontinence. Extremities:  LE edema. Skin:  No recent rashes or mole changes. Neurological:  Weakness LE's and falls    Objective:     Vitals:    11/29/17 1033 11/29/17 1059   BP: 170/71 140/70   Pulse: 62    Temp: 98.2 °F (36.8 °C)    TempSrc: Oral    SpO2: 95%    Weight: 240 lb 3.2 oz (109 kg)    Height: 6' 2\" (1.88 m)    PainSc:   0 - No pain        Body mass index is 30.84 kg/(m^2). Physical Examination:              General Appearance:  Well-developed, well-nourished, no acute  distress. HEENT:      Ears:  The TMs and ear canals were clear. Eyes:  The pupillary responses were normal.  Extraocular muscle function intact. Lids and conjunctiva not injected. Neck:  Supple without thyromegaly or adenopathy. No JVD noted. Lungs:  Clear to auscultation and percussion. Cardiac:  Regular rate and rhythm without murmur. GI: nontender w/o mass. Normal BS's. Extremities:  2+ edema of legs with stasis dermatitis. Eczema left forearm. Skin:  No rash or unusual mole changes noted. Neurological:  Grossly normal.            Assessment/Plan:   Impressions:      ICD-10-CM ICD-9-CM    1. Essential hypertension, benign I10 401.1    2. Controlled type 2 diabetes mellitus with diabetic neuropathy, without long-term current use of insulin (HCC) E11.40 250.60      357.2    3. Weakness of both lower extremities R29.898 729.89    4. Spinal stenosis of lumbar region with neurogenic claudication M48.062 724.03    5. Risk for falls Z91.81 V15.88    6. Venous insufficiency of both lower extremities I87.2 459.81    7. Venous stasis ulcer of other part of right lower leg with varicose veins, unspecified ulcer stage ([facility] I83.018 454.0     L97.819          Plan:  1. Continue present meds  2. Lifestyle modifications including Na restriction, low carb/fat diet, weight reduction and exercise (at least a walking program). 3. Increase PT  4. Cream for stasis  5. Comp hose LE's        Follow-up Disposition:  Return in about 6 weeks (around 1/10/2018) for CPE. Orders Placed This Encounter    hydrALAZINE (APRESOLINE) 25 mg tablet     Sig: Take 1 Tab by mouth two (2) times a day.      Dispense:  60 Tab     Refill:  3       Radha Deluca MD

## 2017-11-29 NOTE — PROGRESS NOTES
Reviewed record in preparation for visit and have obtained necessary documentation. Identified pt with two pt identifiers(name and ). Chief Complaint   Patient presents with    Hypertension     fu        Coordination of Care Questionnaire:  :     1) Have you been to an emergency room, urgent care clinic since your last visit? No     Hospitalized since your last visit? No             2) Have you seen or consulted any other health care providers outside of [address] [place] [address] since your last visit?  No

## 2017-12-24 RX ORDER — FLUTICASONE PROPIONATE 50 MCG
SPRAY, SUSPENSION (ML) NASAL
Qty: 2880 G | Refills: 3 | Status: SHIPPED | OUTPATIENT
Start: 2017-12-24 | End: 2019-06-10 | Stop reason: SDUPTHER

## 2017-12-27 ENCOUNTER — LAB ONLY (OUTPATIENT)
Dept: INTERNAL MEDICINE CLINIC | Age: 82
End: 2017-12-27

## 2017-12-27 DIAGNOSIS — E11.40 CONTROLLED TYPE 2 DIABETES MELLITUS WITH DIABETIC NEUROPATHY, WITHOUT LONG-TERM CURRENT USE OF INSULIN (HCC): ICD-10-CM

## 2017-12-27 DIAGNOSIS — E78.2 HYPERLIPIDEMIA, MIXED: ICD-10-CM

## 2017-12-27 DIAGNOSIS — Z00.00 ROUTINE GENERAL MEDICAL EXAMINATION AT A HEALTH CARE FACILITY: Primary | ICD-10-CM

## 2017-12-27 LAB
ALBUMIN SERPL-MCNC: 4.2 G/DL (ref 3.9–5.4)
ALKALINE PHOS POC: 78 U/L (ref 38–126)
ALT SERPL-CCNC: 37 U/L (ref 9–52)
AST SERPL-CCNC: 35 U/L (ref 14–36)
BACTERIA UA POCT, BACTPOCT: NORMAL
BILIRUB UR QL STRIP: NEGATIVE
BUN BLD-MCNC: 36 MG/DL (ref 9–20)
CALCIUM BLD-MCNC: 9.8 MG/DL (ref 8.4–10.2)
CASTS UA POCT: 0
CHLORIDE BLD-SCNC: 101 MMOL/L (ref 98–107)
CHOLEST SERPL-MCNC: 139 MG/DL (ref 0–200)
CK (CPK) POC: 270 U/L (ref 30–135)
CLUE CELLS, CLUEPOCT: NEGATIVE
CO2 POC: 30 MMOL/L (ref 22–32)
CREAT BLD-MCNC: 0.6 MG/DL (ref 0.8–1.5)
CRYSTALS UA POCT, CRYSPOCT: NEGATIVE
EGFR (POC): 95
EPITHELIAL CELLS POCT: NORMAL
GLUCOSE POC: 141 MG/DL (ref 75–110)
GLUCOSE UR-MCNC: NEGATIVE MG/DL
GRAN# POC: 5.1 K/UL (ref 2–7.8)
GRAN% POC: 73.5 % (ref 37–92)
HBA1C MFR BLD HPLC: 6.2 % (ref 4.5–5.7)
HCT VFR BLD CALC: 42.9 % (ref 37–51)
HDLC SERPL-MCNC: 32 MG/DL (ref 35–130)
HGB BLD-MCNC: 14.2 G/DL (ref 12–18)
IRON POC: 66 UG/DL (ref 49–181)
IRON SATURATION POC: 16 % (ref 15–55)
KETONES P FAST UR STRIP-MCNC: NEGATIVE MG/DL
LDL CHOLESTEROL POC: 82.6 MG/DL (ref 0–130)
LY# POC: 1.4 K/UL (ref 0.6–4.1)
LY% POC: 22 % (ref 10–58.5)
MCH RBC QN: 29.8 PG (ref 26–32)
MCHC RBC-ENTMCNC: 33.1 G/DL (ref 30–36)
MCV RBC: 90 FL (ref 80–97)
MICROALBUMIN UR TEST STR-MCNC: NORMAL MG/L (ref 0–20)
MID #, POC: 0.2 K/UL (ref 0–1.8)
MID% POC: 4.5 % (ref 0.1–24)
MUCUS UA POCT, MUCPOCT: NORMAL
PH UR STRIP: 6 [PH] (ref 5–7)
PLATELET # BLD: 174 K/UL (ref 140–440)
POTASSIUM SERPL-SCNC: 4.5 MMOL/L (ref 3.6–5)
PROT SERPL-MCNC: 7.4 G/DL (ref 6.3–8.2)
PROT UR QL STRIP: NORMAL
PSA SERPL-MCNC: 2.6 NG/ML (ref 0–4)
RBC # BLD: 4.76 M/UL (ref 4.2–6.3)
RBC UA POCT, RBCPOCT: NORMAL
SODIUM SERPL-SCNC: 146 MMOL/L (ref 137–145)
SP GR UR STRIP: 1.01 (ref 1.01–1.02)
TCHOL/HDL RATIO (POC): 4.3 (ref 0–4)
TIBC POC: 412 UG/DL (ref 260–462)
TOTAL BILIRUBIN POC: 1 MG/DL (ref 0.2–1.3)
TRICH UA POCT, TRICHPOC: NEGATIVE
TRIGL SERPL-MCNC: 122 MG/DL (ref 0–200)
TSH BLD-ACNC: 2.47 UIU/ML (ref 0.4–4.2)
UA UROBILINOGEN AMB POC: NORMAL (ref 0.2–1)
URINALYSIS CLARITY POC: CLEAR
URINALYSIS COLOR POC: NORMAL
URINE BLOOD POC: NORMAL
URINE CULT COMMENT, POCT: NORMAL
URINE LEUKOCYTES POC: NORMAL
URINE NITRITES POC: NEGATIVE
VITAMIN D POC: 40.3 NG/ML (ref 30–96)
VLDLC SERPL CALC-MCNC: 24.4 MG/DL
WBC # BLD: 6.7 K/UL (ref 4.1–10.9)
WBC UA POCT, WBCPOCT: NORMAL
YEAST UA POCT, YEASTPOC: NEGATIVE

## 2017-12-31 PROBLEM — E66.09 CLASS 1 OBESITY DUE TO EXCESS CALORIES WITH SERIOUS COMORBIDITY IN ADULT: Status: ACTIVE | Noted: 2017-12-31

## 2018-01-03 ENCOUNTER — OFFICE VISIT (OUTPATIENT)
Dept: INTERNAL MEDICINE CLINIC | Age: 83
End: 2018-01-03

## 2018-01-03 VITALS
WEIGHT: 227.2 LBS | DIASTOLIC BLOOD PRESSURE: 74 MMHG | OXYGEN SATURATION: 95 % | HEIGHT: 74 IN | HEART RATE: 73 BPM | RESPIRATION RATE: 20 BRPM | BODY MASS INDEX: 29.16 KG/M2 | SYSTOLIC BLOOD PRESSURE: 147 MMHG | TEMPERATURE: 97.8 F

## 2018-01-03 DIAGNOSIS — R80.9 PROTEINURIA, UNSPECIFIED TYPE: ICD-10-CM

## 2018-01-03 DIAGNOSIS — I10 ESSENTIAL HYPERTENSION, BENIGN: ICD-10-CM

## 2018-01-03 DIAGNOSIS — I67.9 CVD (CEREBROVASCULAR DISEASE): ICD-10-CM

## 2018-01-03 DIAGNOSIS — L97.819 VENOUS STASIS ULCER OF OTHER PART OF RIGHT LOWER LEG WITH VARICOSE VEINS, UNSPECIFIED ULCER STAGE (HCC): ICD-10-CM

## 2018-01-03 DIAGNOSIS — I83.018 VENOUS STASIS ULCER OF OTHER PART OF RIGHT LOWER LEG WITH VARICOSE VEINS, UNSPECIFIED ULCER STAGE (HCC): ICD-10-CM

## 2018-01-03 DIAGNOSIS — E78.2 HYPERLIPIDEMIA, MIXED: ICD-10-CM

## 2018-01-03 DIAGNOSIS — M48.062 SPINAL STENOSIS OF LUMBAR REGION WITH NEUROGENIC CLAUDICATION: ICD-10-CM

## 2018-01-03 DIAGNOSIS — R80.9 CONTROLLED TYPE 2 DIABETES MELLITUS WITH MICROALBUMINURIA, WITHOUT LONG-TERM CURRENT USE OF INSULIN (HCC): Primary | ICD-10-CM

## 2018-01-03 DIAGNOSIS — D69.6 THROMBOCYTOPENIA (HCC): ICD-10-CM

## 2018-01-03 DIAGNOSIS — E11.29 CONTROLLED TYPE 2 DIABETES MELLITUS WITH MICROALBUMINURIA, WITHOUT LONG-TERM CURRENT USE OF INSULIN (HCC): Primary | ICD-10-CM

## 2018-01-03 DIAGNOSIS — J43.9 PULMONARY EMPHYSEMA, UNSPECIFIED EMPHYSEMA TYPE (HCC): ICD-10-CM

## 2018-01-03 RX ORDER — NYSTATIN AND TRIAMCINOLONE ACETONIDE 100000; 1 [USP'U]/G; MG/G
CREAM TOPICAL 2 TIMES DAILY
Qty: 60 G | Refills: 5 | Status: SHIPPED | OUTPATIENT
Start: 2018-01-03 | End: 2018-05-02 | Stop reason: SDUPTHER

## 2018-01-03 RX ORDER — NYSTATIN AND TRIAMCINOLONE ACETONIDE 100000; 1 [USP'U]/G; MG/G
CREAM TOPICAL 2 TIMES DAILY
COMMUNITY
End: 2018-01-03 | Stop reason: SDUPTHER

## 2018-01-03 RX ORDER — CLOTRIMAZOLE AND BETAMETHASONE DIPROPIONATE 10; .64 MG/G; MG/G
CREAM TOPICAL 2 TIMES DAILY
COMMUNITY

## 2018-01-03 RX ORDER — ATORVASTATIN CALCIUM 40 MG/1
TABLET, FILM COATED ORAL
Qty: 90 TAB | Refills: 3 | Status: SHIPPED | OUTPATIENT
Start: 2018-01-03 | End: 2018-12-29 | Stop reason: SDUPTHER

## 2018-01-03 RX ORDER — HYDRALAZINE HYDROCHLORIDE 25 MG/1
25 TABLET, FILM COATED ORAL 2 TIMES DAILY
Qty: 180 TAB | Refills: 3 | Status: SHIPPED | OUTPATIENT
Start: 2018-01-03 | End: 2018-03-20

## 2018-01-03 NOTE — PROGRESS NOTES
Reviewed record in preparation for visit and have obtained necessary documentation. Identified pt with two pt identifiers(name and ).               Chief Complaint   Patient presents with    Complete Physical        Coordination of Care Questionnaire:  :     1) Have you been to an emergency room, urgent care clinic since your last visit?no Hospitalized since your last visit? no             2) Have you seen or consulted any other health care providers outside of [address] [place] [address] since your last visit? no

## 2018-01-03 NOTE — PROGRESS NOTES
Mr. Dana Vee is an [de-identified]year old  male who comes to the office today for annual comprehensive personal healthcare examination. History of Present Illness: This patient has multiple medical problems. These include:  1. Hypertension, adequately controlled. 2. Hyperlipidemia, adequately controlled. 3. Diabetes type 2, controlled, but with microalbuminuria. 4. Benign prostatic hypertrophy. 5. Bilateral carotid artery stenosis, status post left carotid endarterectomy with residual 50-79% stenosis on the right, which has been stable for the last couple years on carotid duplex. 6. History of recurrent cellulitis of the lower extremities associated with venous insufficiency. He still has issues with stasis dermatitis with no recent stasis ulcers and a very stable chronic edema. 7. Chronic back pain subsequent to an L2 compression fracture in 1985.  8. Lumbar spinal stenosis with bilateral foot drop, status post laminectomy at L4-5 with bilateral decompressions in October, 2015. He continues to have intermittent problems with moderate back pain and lower extremity weakness. He has been improved recently with some renewed physical therapy. He's not required recent epidural steroid injections. 9. CKD stage 2.  10. History of colon polyps. 11. Minimal COPD. 12. History of thrombocytopenia in the past, currently normal platelet count. 13. History of frequent falls. He uses a cane or walker to prevent falls. We also talked about resuming using his AFOs for his foot drop. 14. History of syncope without recent recurrence. 15. Allergic rhinitis. 16. Degenerative joint disease, status post bilateral TKRs. 17. History of GERD and dysphagia, corrected with endoscopy and dilatation. He still has intermittent issues with strangling on liquids related to esophageal dyskinesia. 18. Lower extremity venous insufficiency. 19. Recurrent edema and cellulitis and stasis dermatitis as noted.   20. Erectile dysfunction. 21. Fatty liver. 22. History of deep vein thrombosis, although none recently. 23. Mild mitral regurgitation and aortic insufficiency. 24. Decreased auditory acuity with hearing aids. 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 26. Proteinuria, not all related to microalbuminuria. 27. 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 28. Overactive bladder. 29. Remote history of peptic ulcer disease. 30. Body mass index greater than 29, consistent with an overweight state. 31. Vitamin D deficiency. 32. Chronic right shoulder pain. 33. Umbilical hernia, easily reducible. 34. History of fecal soiling, which is corrected. At the time of the visit he was doing well except for issues with lower extremity weakness and his back. He was doing more physical therapy and we talked about going back to the  for water exercise as well. He denied other new cardiorespiratory, GI or  symptoms. Past Medical History:  Otherwise remarkable for bilateral cataract extractions, bilateral TKRs, right eye vitrectomy and left carotid endarterectomy, as well as the back surgeries. Allergies:  Niaspan causes a rash. Other allergies include Vesicare, penicillin and OxyContin. Current Medications:  1. Aspirin 325 mg daily. 2. Atorvastatin 40 mg daily. 3. Flonase, two sprays a day. 4. Mometasone cream twice a day for stasis dermatitis on his legs. 5. Furosemide 80 mg, 1 1/2 tablets daily. 6. Levitra as needed. 7. Lisinopril 40 mg daily. 8. Omega 3, 1 gram 2 twice a day. 9. Metformin 500 mg, two twice a day. 10. Atenolol 50 mg daily. 11. Multivitamin daily. 12. Vitamin D 1,000 I U twice a day. 13. Nystatin Triamcinolone cream, apply to his feet twice a day. 14. Miralax daily as needed. 15. Lotrisone cream perianally as needed. 16. [facility] Easy Fiber, two tablespoons daily. 17. Aleve as needed. 18. Hydralazine 25 mg twice a day.   19. Omeprazole 20 mg daily. Social History:  He is . He is retired. He uses alcohol moderately. He is a former smoker, quit in [address] [place] [address]. Family History:  Brother is . He had hypertension, hyperlipidemia, CAD. Father unknown to him. Mother is . She did have a history of hypertension. Two sisters also have hypertension and high cholesterol and one is . Review of Systems:  CONSTITUTIONAL:  He denies fever, weight loss, sweats or fatigue. HEENT:  No blurred or double vision, headache or dizziness. No difficulty with swallowing, taste, speech or smell. RESPIRATORY:  No cough, wheezing or shortness of breath. No sputum production. CARDIAC:  Denies chest pain, palpitations, unexplained indigestion, syncope, edema, PND or orthopnea. GI:  No changes in bowel movements, no abdominal pain, no bloating, anorexia, nausea, vomiting or heartburn. :  He denies frequency, nocturia, stranguria, dysuria. Some urgency of urination and mild ED. EXTREMITIES:   Lower extremity weakness and edema. Some right shoulder pain and left elbow pain. Difficulties with bilateral knee pain. He also notes stasis dermatitis and stasis ulcerations at times on his lower extremities. SKIN:  Stasis dermatitis involving his lower extremities. MUSCULOSKELETAL:  Back, hip and buttock pain at times. NEUROLOGIC:  Foot drop bilaterally. NEUROLOGICAL:  No numbness, tingling, burning paresthesias or loss of motor strength. No syncope, dizziness, frequent headaches or memory loss. Physical Examination:  GENERAL:  Well-developed, well-nourished, no acute distress. VITAL SIGNS:  BP:  137/74. P: 73 and slightly irregular. R: 20.  T: 97.8. HT: 6'2\". WT: 227 lbs. BMI: 29.2. VISION:  Deferred to ophthalmologist.  HEARING:  Decreased across all frequencies. It was tested this year as he did not bring his hearing aids. HEENT:  Ears:  The TMs and ear canals were clear.   Eyes:  The pupillary responses were normal. Extraocular muscle function intact. Lids and conjunctivae not injected. Fundoscopic exam revealed sharp disc margins. Pharynx:  Partial plates. No masses were noted. NECK:  Supple without thyromegaly or adenopathy. No JVD noted. Right carotid bruit. Well healed left carotid endarterectomy incision. LUNGS:  Clear to auscultation and percussion. CARDIAC:  Grade 2/6 systolic murmur. PMI not displaced. No gallop, rub or click. ABDOMEN:  Obese, soft, non-tender without palpable organomegaly or mass. No pulsatile mass was felt and no bruit was heard. Bowel sounds were active. Umbilical hernia, easily reducible. :  Circumcised male. Both testes descended and no masses felt. RECTAL EXAM:  deferred. EXTREMITIES:  No clubbing, cyanosis. 1-2+ edema with stasis changes of both lower extremities below the knees. Well healed incisions over both knees. Pulses intact. Sensation is diminished to monofilament testing. No callus formation, ulceration noted on his feet. PULSES:  Dorsalis pedis and posterior tibial pulses felt without difficulty. SKIN:  No rash or unusual mole changes noted. LYMPH NODES:  None felt in the cervical, supraclavicular, axillary or inguinal region. NEUROLOGICAL:  Mild generalized weakness, particularly in is lower extremities, and bilateral foot drops. Ankle reflexes absent. Diabetic foot exam:     Left: Reflexes 1+     Vibratory sensation diminished    Proprioception diminished   Sharp/dull discrimination diminished    Filament test absent sensation with micro filament   Pulse DP: 1+ (weak)   Pulse PT: 1+ (weak)   Deformities: None  Right: Reflexes 1+   Vibratory sensation diminished   Proprioception diminished   Sharp/dull discrimination diminished   Filament test absent sensation with micro filament   Pulse DP: 1+ (weak)   Pulse PT: 1+ (weak)   Deformities: None    Laboratory:  Hemoglobin is 14.2. White blood count is 6,700. Blood sugar is 141.   Liver and kidney function are normal.  Electrolytes are normal.  Iron is 66. TSH is 2.47. PSA 2.6. Vitamin D level is 40.3. Urinalysis is clear except for proteinuria. Urine for microalbumin 100. A1c 6.2. Lipid profile reveals a total cholesterol of 139, triglycerides of 122, HDL cholesterol of  32 and an LDL of 82.6. Health Maintenance Issues and Ancillary Studies:  1. TDAP (pertussis and tetanus) vaccine was given in October, 2012. 2. Pneumovax 23 (PPSV23) was given in January, 2012. 3. Seasonal influenza vaccine was given in October, 2017.  4. Shingles vaccine was given in October, 2010. 5. Prevnar 13 (PCV13) was given in January, 2015. 6. Hepatitis A vaccine given in September, 2007. 7. Protein creatinine ratio reveals > 4 grams protein  8. Venous doppler of the lower extremities negative for DVT in October, 2012. 9. EGD revealed some mild gastritis and duodenitis, as well as esophageal dyskinesia in July, 2017. He was empirically dilated. 10. Carotid dopplers revealed 50-79% stenosis on the right and a clean endarterectomy site on the left in September, 2017. 11. Echocardiogram in March, 2015 revealed ejection fraction of 60% with mild AI and MR.  12. Stress testing was negative in January, 2013 except for poor exercise tolerance. 13. MRI of brain in March, 2015 was negative. 14. MRI of the cervical spine in March, 2015 revealed multilevel degenerative disc disease, especially at C6-7, where there was some stenosis. 15. EBT heart scan revealed a calcium score of 1,092 in January, 2013. 16. Diabetic eye examination during the summer revealed no diabetic retinopathy according to him. We are attempting to get the records. The last eye examination we have is in August of 2016, revealing no diabetic retinopathy. 17. Colonoscopy in December, 2014 was negative. Follow up as needed. 18. MRI of the LS spine in October, 2016 revealed multilevel degenerative disc disease.   19. EKG in the office essentially normal.  20. Pulmonary function studies are normal.  21. Health screening assessments are essentially normal.    Impression:  1. Hypertension  2. Hyperlipidemia. 3. Cerebrovascular disease, status post left CVA with residual right carotid stenosis  4. Mild valvular heart disease with AI and minimal MR.  5. History of thrombocytopenia, corrected. 6. BPH. 7. Allergic rhinitis. 8. Frequent falls. 9. Venous insufficiency with previous history of cellulitis and current stasis dermatitis. 10. Chronic back pain with history of L2 compression fracture and lumbar spondylosis/stenosis, status post laminectomy with residual lower extremity weakness. 11. CKD-2.  12. History of colon polyps  13. Minimal coronary artery disease. 14. History of fatty liver. 15. Degenerative joint disease, status post bilateral total knee replacements. 16. Diabetes type 2, adequately controlled. 17. GERD with previous history of dysphagia, resolved with dilatation. 18. Recent history of gastritis/duodenitis, now on a PPI. 19. ED. 20. History of DVT. 21. Bilateral hearing loss. 22. History of hiatal hernia. 23. Overactive bladder. 24. Proteinuria. 25. Remote history of peptic ulcer disease. 26. Chronic right shoulder pain. 27. Umbilical hernia, easily reducible. 28. Vitamin D deficiency. 29. Allergic rhinitis. 30. Left tardy ulnar palsy. Plan:  1. Continue present medications. 2. Needs more exercise. 3. Needs to resume use of his AFOs. [facility] 5. Recheck here three months time. 6. He appears appropriately treated for his ten year coronary heart disease risk. 7. His diabetes is under good control. 8. His major issue relates to his lower extremities and tendency to fall and he needs to remain very active and use his walker.

## 2018-01-04 LAB
CREAT UR-MCNC: 53 MG/DL
PROT UR-MCNC: 261.6 MG/DL
PROT/CREAT UR: 4936 MG/G CREAT (ref 0–200)

## 2018-01-04 NOTE — LETTER
2018 Mr. John Brownlee 
[address] [facility] [address] Dear Mr. Amadoparul Rodriguez: It was a pleasure to see you on January 3, 2018 for your annual comprehensive personal healthcare examination. This note is a follow-up to what we discussed at the time of that examination. We also discussed your laboratory studies at the time of that visit. In summary, your past and present medical history includes the followin. Hypertension, adequately controlled. 2. Hyperlipidemia, adequately controlled. 3. Type 2 diabetes, controlled with microalbuminuria. 4. Chronic proteinuria related to hypertension and diabetes. Nathaly Juárez 5. Benign prostatic hypertrophy. 6. Bilateral carotid artery stenosis, status post left carotid endarterectomy with residual 50-79% stenosis on the right. This is stable for the last several years on carotid duplex. 7. History of recurrent cellulitis of the lower extremities associated with venous insufficiency. You still have issues with stasis dermatitis, but have had no recent stasis ulcers and no recent cellulitis. Your swelling has been stable, as well. 8. Chronic back pain subsequent to an L2 compression fracture in . 
9. Lumbar spinal stenosis with bilateral foot drop contributing to your back pain as well. You have undergone laminectomy at L4-5 with bilateral decompressions in . You still continue to have intermittent problems with moderate back pain and lower extremity weakness, however. Recent physical therapy seems to have improved you to some degree. You have not required recent epidural steroid injections. If symptoms worsen you will need to see Dr. Cece Bowman again. 10. Chronic kidney disease, stage 2, with proteinuria. 11. History of colon polyps. 12. Minimal COPD. 13. History of thrombocytopenia, (low platelet count), in the past.  Your platelet count is currently normal. 
14. History of frequent falls.   You use a cane or a walker to prevent these falls. We also talked on the day of the visit about use of your AFOs, (braces), for your foot drop bilaterally. 15. History of syncope without recent recurrence. 16. Allergic rhinitis. 17. Degenerative joint disease, status post bilateral total knee replacements. 18. History of gastroesophageal reflux disease and difficulty swallowing at times. This was corrected with endoscopy and dilatation. You still have intermittent issues with strangling on liquids related to esophageal dyskinesia. 19. Lower extremity venous insufficiency. 20. Recurrent edema and cellulitis and stasis dermatitis associated with a venous insufficiency as noted above. 21. Erectile dysfunction. 22. Fatty liver. 23. Prior history of deep vein thrombosis, although you have had none recently. 24. Mild mitral regurgitation and aortic insufficiency, (mildly leaky heart valves, which are not clinically significant). 25. Decreased auditory acuity with hearing aids. 26. 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 27. 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 28. Overactive bladder. 29. Remote history of peptic ulcer disease. 30. Body mass index consistent with an overweight state. 31. Vitamin D deficiency. 32. Chronic right shoulder pain. 33. Umbilical hernia, easily reducible. 34. Fecal soiling, which has corrected recently. At the time of the visit you were doing well except for the issues with your lower extremity weakness and your back. You were doing more physical therapy and [facility] You denied new cardiorespiratory, gastrointestinal or genitourinary symptoms. Your past medical history was otherwise remarkable for bilateral cataract extractions, bilateral total knee replacements, a right eye vitrectomy, the left carotid endarterectomy, as well as your back surgeries. Your examination revealed a normal blood pressure of 137/74. Pulse was 73 and slightly irregular related to premature atrial contractions, which are benign. Body mass index was 29.2, consistent with an overweight state. Vision was deferred to the ophthalmologist.  Enolia Faden revealed a decrease across all frequencies. You did not bring your hearing aids with you on the day of the visit. Pharynx was clear with partial plates. No masses were noted. Thyroid was not enlarged. There was a right carotid bruit and a well healed left carotid endarterectomy incision. Lungs were clear. Cardiovascular examination revealed a grade 2/6 systolic murmur consistent with your valvular heart disease. Abdomen revealed no masses or tenderness. There was an umbilical hernia that was easily reducible. Rectal examination was deferred. Extremities revealed 1-2+ edema with stasis changes of both lower extremities below the knees. There were well healed incisions over both knees. Sensation was diminished, particularly to monofilament testing. No callus formation or ulcerations were noted on your feet. Neurologic revealed mild generalized weakness, particularly in your lower extremities, and bilateral foot drops. Knee reflexes were reduced and ankle reflexes absent. The remainder of your examination was essentially normal. 
 
Your current medications are: 1. Aspirin 325 mg daily. 2. Atorvastatin 40 mg daily. 3. Flonase, two sprays a day. 4. Mometasone cream twice a day for the stasis dermatitis on your legs. 5. Furosemide 80 mg, 1 1/2 tablets daily. 6. Levitra as needed. 7. Lisinopril 40 mg daily. 8. Omega 3, one gram, two twice a day. 9. Metformin 500 mg, two twice a day. 10. Atenolol 50 mg daily. 11. Multivitamin daily. 12. Vitamin D 1,000 I U twice a day. 13. Nystatin Triamcinolone cream applied to your feet twice a day. 14. Miralax daily as needed. 15. Lotrisone cream to the perianal region as needed. 16. [employer] Easy Fiber, two tablespoons daily. 17. Aleve as needed. 18. Amlodipine 5 mg daily. 19. Hydralazine 25 mg twice a day. 20. Omeprazole 20 mg daily. Your laboratory studies revealed a normal hemoglobin of 14.2. White blood count was normal at 6,700. Blood sugar was 141 and your A1c was good at 6.2, indicating adequate control of your diabetes. Liver and kidney function were normal.  Electrolytes were normal.  Iron was normal at 66. TSH was normal at 2.47, indicating normal thyroid function. PSA was fine at 2.6. Vitamin D level was normal at 40.3. Urinalysis revealed proteinuria and the amount of proteinuria was noted to be greater than 4 grams, which is slightly more than last year. Urine for microalbumin, a marker of damage to your kidneys by diabetes, was minimally elevated at 100. Lipid profile was excellent with a total cholesterol of 139, triglycerides of 122, HDL cholesterol of 32 and LDL cholesterol of 82.6. Health maintenance issues and ancillary studies include the followin. You are up to date on immunizations, including TDAP, pneumovax 23, seasonal influenza vaccine, shingles vaccine, Prevnar 13 and hepatitis A vaccine. 2. Venous doppler of the lower extremities was negative for recurrent DVT in . 3. Upper endoscopy in 2017 revealed some mild gastritis and duodenitis. There was also esophageal dyskinesia and you were empirically dilated and you have had no difficulties swallowing recently other than the strangling on liquids at times. 4. Carotid dopplers revealed 50-79% stenosis on the right and a clean endarterectomy site on the left in . This is stable. 5. Echocardiogram in  revealed a normal ejection fraction. Your aortic and mitral valves were minimally leaky. 6. Stress testing was negative in  except for poor exercise tolerance. 7. MRI of the brain in  was negative. 8. MRI of the cervical spine in March, 2015 revealed multilevel degenerative disc disease, especially at C6-7, where there was also some narrowing/stenosis. 9. EBT heart scan revealed an elevated calcium score of 1,092 in January, 2013. 10. Diabetic eye examination during the summer revealed no diabetic retinopathy according to you. We are still attempting to get those records. The last eye examination we have in our records is from August, 2016. 11. Colonoscopy in December, 2014 was negative. 12. MRI of the lumbosacral spine in October, 2016 revealed multilevel degenerative disc disease. 13. EKG in the office was essentially normal. 
14. Pulmonary function studies were normal. 
15. Health screening assessments were essentially normal.   
 
We discussed the following problems and goals: 1. Continue present medications. 2. You need more exercise. 3. You need to resume use of your AFOs. 4. [facility]. 5. Recheck here three months time. 6. You appear appropriately treated for your ten year coronary heart disease risk. 7. Your diabetes is under good control. 8. Your major issues relate to your lower extremities and the tendency to fall and you need to remain very active and use your walker. I hope this information proves useful to you. I look forward to seeing you at your next scheduled appointment time. If you have any questions, please feel free to contact the office. Due to patient security requirements, we now offer a secure portal called [employer], where the report for this visit has been sent. You can download your information to a flash drive or other mobile device if necessary from the portal.  If you do not have a secure portal account and do not desire to sign up for one, you can bring in a flash drive from previous years and we will download the information for you. Sincerely, Alesia Bansal MD

## 2018-01-05 ENCOUNTER — OFFICE VISIT (OUTPATIENT)
Dept: INTERNAL MEDICINE CLINIC | Age: 83
End: 2018-01-05

## 2018-01-05 VITALS
OXYGEN SATURATION: 96 % | BODY MASS INDEX: 29.13 KG/M2 | WEIGHT: 227 LBS | DIASTOLIC BLOOD PRESSURE: 80 MMHG | HEIGHT: 74 IN | HEART RATE: 75 BPM | RESPIRATION RATE: 14 BRPM | TEMPERATURE: 97.5 F | SYSTOLIC BLOOD PRESSURE: 170 MMHG

## 2018-01-05 DIAGNOSIS — K40.90 INGUINAL HERNIA OF RIGHT SIDE WITHOUT OBSTRUCTION OR GANGRENE: Primary | ICD-10-CM

## 2018-01-05 NOTE — PROGRESS NOTES
Mr. Amarilis Brewster bent over to put on his socks this afternoon and felt a bulge in his groin. On a pain scale it was a 3/10. He was concerned about a hernia and comes to the office today to be checked. Physical Examination:  GENERAL:  WT: 227. T: 97.5. BP: 170/80. P: 75 and regular. ABDOMEN:  Flat, soft and non tender. There is a large right direct inguinal hernia, which reduces easily when he lies flat. There is a large neck on it. Impression:  1. Symptomatic inguinal hernia. Plan:  1. I will notify Dr. Stacie Gabriel on Monday about this and he can make a surgical referral.  No treatment indicated at this time.

## 2018-01-05 NOTE — PROGRESS NOTES
1. Have you been to the ER, urgent care clinic since your last visit? Hospitalized since your last visit? No    2. Have you seen or consulted any other health care providers outside of the [address] [place] [address] since your last visit? Include any pap smears or colon screening. No     Chief Complaint   Patient presents with    Hernia (Non Specific)     patient states that about 1:00 pm today, he bend down to put a compression sock on and when he got up he felt a hernia in his groin area.       Not fasting

## 2018-01-08 DIAGNOSIS — K40.90 RIGHT INGUINAL HERNIA: Primary | ICD-10-CM

## 2018-01-10 ENCOUNTER — OFFICE VISIT (OUTPATIENT)
Dept: SURGERY | Age: 83
End: 2018-01-10

## 2018-01-10 VITALS
OXYGEN SATURATION: 97 % | TEMPERATURE: 98.7 F | DIASTOLIC BLOOD PRESSURE: 74 MMHG | BODY MASS INDEX: 28.23 KG/M2 | RESPIRATION RATE: 16 BRPM | HEIGHT: 74 IN | WEIGHT: 220 LBS | SYSTOLIC BLOOD PRESSURE: 156 MMHG | HEART RATE: 87 BPM

## 2018-01-10 DIAGNOSIS — K40.90 INGUINAL HERNIA OF RIGHT SIDE WITHOUT OBSTRUCTION OR GANGRENE: Primary | ICD-10-CM

## 2018-01-10 NOTE — MR AVS SNAPSHOT
Visit Information Date & Time Provider Department Dept. Phone Encounter #  
 1/10/2018  3:00 PM Jimenez Brady MD [facility] [place] [address] [id number] Your Appointments 4/3/2018 10:30 AM  
FOLLOW UP 10 with [facility] ([facility]) Appt Note: 3 month follow up [place] [address] [address] [address] [address] Upcoming Health Maintenance Date Due  
 EYE EXAM RETINAL OR DILATED Q1 1/25/1945 GLAUCOMA SCREENING Q2Y 1/25/2002 MEDICARE YEARLY EXAM 1/25/2002 DTaP/Tdap/Td series (1 - Tdap) 10/20/2012 HEMOGLOBIN A1C Q6M 6/27/2018 MICROALBUMIN Q1 12/27/2018 LIPID PANEL Q1 12/27/2018 FOOT EXAM Q1 1/3/2019 Allergies as of 1/10/2018  Review Complete On: 1/10/2018 By: Jimenez Brady MD  
  
 Severity Noted Reaction Type Reactions Pcn [Penicillins] High 08/17/2011   Systemic Swelling Swelling of legs & feet Oxycontin [Oxycodone] Medium 06/19/2012   Side Effect Other (comments) Agitation/felt irritable Ibuprofen  03/13/2015    Other (comments) \"Rage\" Niaspan [Niacin]  07/26/2017    Rash Vesicare [Solifenacin]  07/26/2017    Other (comments) Attributes: Abdominal pain Current Immunizations  Reviewed on 3/12/2015 Name Date Hep A Vaccine 9/1/2007 Influenza High Dose Vaccine PF 10/31/2017 Influenza Vaccine 9/12/2014 Pneumococcal Conjugate (PCV-13) 1/19/2015 Pneumococcal Polysaccharide (PPSV-23) 1/3/2012 Pneumococcal Vaccine (Unspecified Type) 9/12/2014 Td 10/19/2012 Tdap 10/19/2012 Zoster Vaccine, Live 10/1/2010 Not reviewed this visit Vitals BP Pulse Temp Resp Height(growth percentile) Weight(growth percentile)  156/74 (BP 1 Location: Right arm, BP Patient Position: Sitting) 87 98.7 °F (37.1 °C) (Oral) 16 6' 2\" (1.88 m) 220 lb (99.8 kg) SpO2 BMI Smoking Status 97% 28.25 kg/m2 Former Smoker Vitals History BMI and BSA Data Body Mass Index Body Surface Area  
 28.25 kg/m 2 2.28 m 2 Preferred Pharmacy Pharmacy Name Phone [id number] [place] [id number] [place] [phone] Your Updated Medication List  
  
   
This list is accurate as of: 1/10/18  4:00 PM.  Always use your most recent med list.  
  
  
  
  
 ALEVE 220 mg tablet Generic drug:  naproxen sodium Take 220 mg by mouth two (2) times daily as needed. amLODIPine 5 mg tablet Commonly known as:  Shu Hensen Take 1 Tab by mouth daily. aspirin 325 mg tablet Commonly known as:  ASPIRIN Take 325 mg by mouth daily. atenolol 50 mg tablet Commonly known as:  TENORMIN  
TAKE 1 TABLET DAILY  
  
 atorvastatin 40 mg tablet Commonly known as:  LIPITOR  
TAKE 1 TABLET DAILY  
  
 clotrimazole-betamethasone topical cream  
Commonly known as:  Sweetie Rave Apply  to affected area two (2) times a day. FIBER PO Take  by mouth daily. Indications: 2 TBSP daily  
  
 fluticasone 50 mcg/actuation nasal spray Commonly known as:  FLONASE  
USE 2 SPRAYS NASALLY DAILY FREESTYLE LITE METER  
by Does Not Apply route. furosemide 80 mg tablet Commonly known as:  LASIX Take 120 mg by mouth daily. hydrALAZINE 25 mg tablet Commonly known as:  APRESOLINE Take 1 Tab by mouth two (2) times a day. LEVITRA 20 mg tablet Generic drug:  vardenafil Take 20 mg by mouth as needed. lisinopril 40 mg tablet Commonly known as:  Navarro Varun Take 1 Tab by mouth every morning. Hold until Blood pressure is greater than 120 mmHg. Have home health nursing check  Indications: HYPERTENSION  
  
 metFORMIN  mg tablet Commonly known as:  GLUCOPHAGE XR  
TAKE 2 TABLETS TWICE A DAY MIRALAX 17 gram/dose powder Generic drug:  polyethylene glycol Take 17 g by mouth daily as needed. mometasone 0.1 % topical cream  
Commonly known as:  Titi Diones Apply  to affected area two (2) times a day. Indications: SKIN RASH MULTIPLE VITAMINS tablet Generic drug:  multivitamin Take 1 Tab by mouth daily. nystatin-triamcinolone topical cream  
Commonly known as:  MYCOLOG II Apply  to affected area two (2) times a day. omega-3 acid ethyl esters 1 gram capsule Commonly known as:  Belkys Paul TAKE 2 CAPSULES TWICE A DAY  
  
 omeprazole 20 mg capsule Commonly known as:  PRILOSEC Take 1 Cap by mouth daily for 360 days. VITAMIN D3 1,000 unit Cap Generic drug:  cholecalciferol Take 1 Cap by mouth two (2) times a day. To-Do List   
 01/19/2018 11:00 AM  
  Appointment with [facility] PAT ROOM P2 at [place] [address] ([phone]) Patient Instructions Surgery Instruction Sheet You have been scheduled for surgery on 01/25/2018 at 3:45pm at [address] [place] [address]. Please report to the Surgery Center at 1:45pm, this is approximately 2 hours prior to your surgery time and is subject to change. The Surgery Center is located on the [address] [place] [address] side of the \[facility]\"", just next to the Emergency Room. Reserved parking is available and  parking if lot is full. You will need to have a Pre-op Visit prior to your surgery. Report to the Surgery Center on 01/19/2018 at 11:00am.  Bring a list of medications and your insurance cards with you. You may eat/drink prior to this visit. Call your physician immediately if you notice a change in your health between the time you saw your physician and the day of surgery. If you take a blood thinner, please let us know. Call your ordering Doctor to make sure you can stop taking it prior to your surgery. STOP YOUR ASPIRIN 10 DAYS PRIOR TO SURGERY. DO NOT TAKE  IBUPROFEN, ADVIL, MOTRIN, ALEVE, EXCEDRIN, BC POWDER, GOODIES, FISH OIL OR ANY MEDICATION CONTAINING ASPIRIN 10 DAYS PRIOR TO YOUR SURGERY. MAY TAKE TYLENOL. Eat a light dinner the evening before your surgery. DO NOT EAT OR DRINK ANYTHING AFTER MIDNIGHT THE NIGHT BEFORE YOUR SURGERY. This includes water, chewing gum, lifesavers, etc.  The Pre op nurse will check with you about any medication that you may need to take the morning of surgery. Shower with a new bar of anti-bacterial soap (Dial, Safeguard) or solution given to you by Pre-op, the night before surgery. Do not use lotion, powder, deodorant on the skin after showering. Wear loose, comfortable clothing the day of surgery and bring a container to store your contacts, eyeglasses, dentures, hearing aid, etc.  Do not bring money, valuables, jewelry, etc. to the hospital.   
 
If you are having outpatient surgery, someone must come with you the morning of surgery to drive you home. You can not drive for 24 hours after any anesthesia. Sometimes it is necessary to stay overnight and leave the next morning. This is still considered outpatient for most insurance deductibles. Someone will still need to drive you home. If you have questions or concerns, please feel free to call Dr Shyanne Maher at [phone]. If you need to cancel your surgery, please call as soon as possible. Introducing [facility]! [facility] introduces [employer] patient portal. Now you can access parts of your medical record, email your doctor's office, and request medication refills online. 1. In your internet browser, go to [email or web address]. [email or web address] 2. Click on the First Time User? Click Here link in the Sign In box. You will see the New Member Sign Up page. 3. Enter your [employer] Access Code exactly as it appears below. You will not need to use this code after youve completed the sign-up process.  If you do not sign up before the expiration date, you must request a new code. · [employer] Access Code: [email or web address] Expires: 1/29/2018  8:45 AM 
 
4. Enter the last four digits of your Social Security Number (xxxx) and Date of Birth (mm/dd/yyyy) as indicated and click Submit. You will be taken to the next sign-up page. 5. Create a [employer] ID. This will be your [employer] login ID and cannot be changed, so think of one that is secure and easy to remember. 6. Create a [employer] password. You can change your password at any time. 7. Enter your Password Reset Question and Answer. This can be used at a later time if you forget your password. 8. Enter your e-mail address. You will receive e-mail notification when new information is available in [address] 9. Click Sign Up. You can now view and download portions of your medical record. 10. Click the Download Summary menu link to download a portable copy of your medical information. If you have questions, please visit the Frequently Asked Questions section of the [employer] website. Remember, [employer] is NOT to be used for urgent needs. For medical emergencies, dial 911. Now available from your iPhone and Android! Please provide this summary of care documentation to your next provider. Your primary care clinician is listed as Niurka 89. If you have any questions after today's visit, please call [phone].

## 2018-01-10 NOTE — PATIENT INSTRUCTIONS
Surgery Instruction Sheet    You have been scheduled for surgery on 01/25/2018 at 3:45pm at [facility]. Please report to the Surgery Center at 1:45pm, this is approximately 2 hours prior to your surgery time and is subject to change. The Surgery Center is located on the [address] [place] [address] Street side of the hospital, just next to the Emergency Room. Reserved parking is available and  parking if lot is full. You will need to have a Pre-op Visit prior to your surgery. Report to the Surgery Center on 01/19/2018 at 11:00am.  Bring a list of medications and your insurance cards with you. You may eat/drink prior to this visit. Call your physician immediately if you notice a change in your health between the time you saw your physician and the day of surgery. If you take a blood thinner, please let us know. Call your ordering Doctor to make sure you can stop taking it prior to your surgery. STOP YOUR ASPIRIN 10 DAYS PRIOR TO SURGERY. DO NOT TAKE  IBUPROFEN, ADVIL, MOTRIN, ALEVE, EXCEDRIN, BC POWDER, GOODIES, FISH OIL OR ANY MEDICATION CONTAINING ASPIRIN 10 DAYS PRIOR TO YOUR SURGERY. MAY TAKE TYLENOL. Eat a light dinner the evening before your surgery. DO NOT EAT OR DRINK ANYTHING AFTER MIDNIGHT THE NIGHT BEFORE YOUR SURGERY. This includes water, chewing gum, lifesavers, etc.  The Pre op nurse will check with you about any medication that you may need to take the morning of surgery. Shower with a new bar of anti-bacterial soap (Dial, Safeguard) or solution given to you by Pre-op, the night before surgery. Do not use lotion, powder, deodorant on the skin after showering.   Wear loose, comfortable clothing the day of surgery and bring a container to store your contacts, eyeglasses, dentures, hearing aid, etc.  Do not bring money, valuables, jewelry, etc. to the hospital.      If you are having outpatient surgery, someone must come with you the morning of surgery to drive you home. You can not drive for 24 hours after any anesthesia. Sometimes it is necessary to stay overnight and leave the next morning. This is still considered outpatient for most insurance deductibles. Someone will still need to drive you home. If you have questions or concerns, please feel free to call Dr Michele Nolan at [phone]. If you need to cancel your surgery, please call as soon as possible.

## 2018-01-10 NOTE — PROGRESS NOTES
HISTORY OF PRESENT ILLNESS  Christos Cueva is a [de-identified] y.o. male who comes in for consultation by Janet Monaco MD for a hernia  HPI  He recently developed acute right groin swelling. The area is very uncomfortable. The area does reduce when laying down. He denies associated nausea, vomiting, diarrhea, constipation, melena, hematochezia. He does have BPH and some urinary hesitancy and frequency. He has not had surgery in the area previously. He also reports above the umbilical area and the right upper abdomen which do not bother him.     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    Chronic pain     low back    CKD (chronic kidney disease) stage 2, GFR 60-89 ml/min     Class 1 obesity due to excess calories with serious comorbidity in adult 12/31/2017    Colon polyp 7/26/2017    Onset Date: 11/2001 Comments: H/O    Compression fracture of L2 lumbar vertebra ([address] 7/26/2017    Problem onset is 1985 Story: L2     COPD (chronic obstructive pulmonary disease) ([address]     DDD (degenerative disc disease)     chronic back pain    Diabetes ([address]     borderline    DJD (degenerative joint disease)     DVT (deep venous thrombosis) ([address]     1998    Dysphagia 7/26/2017    ED (erectile dysfunction) 7/26/2017    Edema extremities 7/26/2017    Fatty liver     Fecal soiling 7/26/2017    Frequent falls     GERD (gastroesophageal reflux disease)     H/O adenomatous polyp of colon     Hearing loss 7/26/2017    Story: wears bilat hearing aids     Heart valve disorder 7/26/2017    Story: mild MR/AI    Hiatal hernia 7/26/2017    Hypercholesterolemia     Hypertension     Incomplete right bundle branch block 7/26/2017    MR (mitral regurgitation)     Overactive bladder     Proteinuria 7/26/2017    Problem onset is 1975     PUD (peptic ulcer disease)     in late teens   Travis Abad Right shoulder pain 7/26/2017    Risk for falls 7/26/2017    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    Thrombocytopenia (HCC)     Trigger middle finger of left hand 7/57/2349    Umbilical hernia 9/83/8120    Venous insufficiency (chronic) (peripheral)     Venous stasis ulcers ([address] 7/26/2017    Vitamin D deficiency      Past Surgical History:   Procedure Laterality Date    COLONOSCOPY,DIAGNOSTIC  12/4/2014         HX BACK SURGERY  2015    bone spurs removed from lumbar spine (per pt)    HX CAROTID ENDARTERECTOMY      left    HX CATARACT REMOVAL      bilateral lens implant bilateral    HX KNEE REPLACEMENT      bilateral    HX ORTHOPAEDIC      foreign body removal-nail   right leg    MD EGD TRANSORAL BIOPSY SINGLE/MULTIPLE  1/31/2013         TOTAL KNEE ARTHROPLASTY      bilateral    UPPER GI ENDOSCOPY,BALL DIL,30MM  7/27/2017         UPPER GI ENDOSCOPY,BIOPSY  7/27/2017          Family History   Problem Relation Age of Onset    Cancer Mother     Hypertension Mother     Heart Disease Brother     Hypertension Brother     Hypertension Sister     Hypertension Sister      Social History   Substance Use Topics    Smoking status: Former Smoker     Packs/day: 2.00     Years: 35.00     Quit date: 6/19/1998    Smokeless tobacco: Never Used    Alcohol use No      Comment: rarely     Current Outpatient Prescriptions   Medication Sig    atorvastatin (LIPITOR) 40 mg tablet TAKE 1 TABLET DAILY    clotrimazole-betamethasone (LOTRISONE) topical cream Apply  to affected area two (2) times a day.  nystatin-triamcinolone (MYCOLOG II) topical cream Apply  to affected area two (2) times a day.  hydrALAZINE (APRESOLINE) 25 mg tablet Take 1 Tab by mouth two (2) times a day.     fluticasone (FLONASE) 50 mcg/actuation nasal spray USE 2 SPRAYS NASALLY DAILY    BLOOD-GLUCOSE METER (FREESTYLE LITE METER) by Does Not Apply route.  amLODIPine (NORVASC) 5 mg tablet Take 1 Tab by mouth daily.  omega-3 acid ethyl esters (LOVAZA) 1 gram capsule TAKE 2 CAPSULES TWICE A DAY    metFORMIN ER (GLUCOPHAGE XR) 500 mg tablet TAKE 2 TABLETS TWICE A DAY    atenolol (TENORMIN) 50 mg tablet TAKE 1 TABLET DAILY    omeprazole (PRILOSEC) 20 mg capsule Take 1 Cap by mouth daily for 360 days.  vardenafil (LEVITRA) 20 mg tablet Take 20 mg by mouth as needed.  PSYLLIUM SEED, WITH DEXTROSE, (FIBER PO) Take  by mouth daily. Indications: 2 TBSP daily    polyethylene glycol (MIRALAX) 17 gram/dose powder Take 17 g by mouth daily as needed.  naproxen sodium (ALEVE) 220 mg tablet Take 220 mg by mouth two (2) times daily as needed.  furosemide (LASIX) 80 mg tablet Take 120 mg by mouth daily.  lisinopril (PRINIVIL, ZESTRIL) 40 mg tablet Take 1 Tab by mouth every morning. Hold until Blood pressure is greater than 120 mmHg. Have home health nursing check  Indications: HYPERTENSION    mometasone (ELOCON) 0.1 % topical cream Apply  to affected area two (2) times a day. Indications: SKIN RASH    Cholecalciferol, Vitamin D3, (VITAMIN D3) 1,000 unit cap Take 1 Cap by mouth two (2) times a day.  multivitamin (MULTIPLE VITAMINS) tablet Take 1 Tab by mouth daily.  aspirin (ASPIRIN) 325 mg tablet Take 325 mg by mouth daily. No current facility-administered medications for this visit. Allergies   Allergen Reactions    Pcn [Penicillins] Swelling     Swelling of legs & feet    Oxycontin [Oxycodone] Other (comments)     Agitation/felt irritable    Ibuprofen Other (comments)     \"Rage\"    Niaspan [Niacin] Rash    Vesicare [Solifenacin] Other (comments)     Attributes: Abdominal pain       Review of Systems   Constitutional: Negative for chills, diaphoresis, fever, malaise/fatigue and weight loss. HENT: Positive for hearing loss (has hearing aids).  Negative for congestion, ear pain and sore throat. Eyes: Negative for blurred vision and pain. Respiratory: Negative for cough, hemoptysis, sputum production, shortness of breath, wheezing and stridor. Cardiovascular: Positive for leg swelling. Negative for chest pain, palpitations, orthopnea, claudication and PND. Gastrointestinal: Positive for abdominal pain. Negative for blood in stool, constipation, diarrhea, heartburn, melena, nausea and vomiting. Genitourinary: Positive for urgency. Negative for dysuria, flank pain, frequency and hematuria. Musculoskeletal: Negative for joint pain, myalgias and neck pain. Skin: Negative for itching and rash. Neurological: Negative for dizziness, tremors, focal weakness, seizures, weakness and headaches. Endo/Heme/Allergies: Negative for polydipsia. Bruises/bleeds easily. Psychiatric/Behavioral: Negative for depression and memory loss. The patient is not nervous/anxious. Visit Vitals    /74 (BP 1 Location: Right arm, BP Patient Position: Sitting)    Pulse 87    Temp 98.7 °F (37.1 °C) (Oral)    Resp 16    Ht 6' 2\" (1.88 m)    Wt 99.8 kg (220 lb)    SpO2 97%    BMI 28.25 kg/m2       Physical Exam   Constitutional: He is oriented to person, place, and time. He appears well-developed and well-nourished. No distress. HENT:   Head: Normocephalic and atraumatic. Mouth/Throat: Oropharynx is clear and moist. No oropharyngeal exudate. Eyes: Conjunctivae and EOM are normal. Pupils are equal, round, and reactive to light. No scleral icterus. Neck: Normal range of motion. Neck supple. No tracheal deviation present. No thyromegaly present. Cardiovascular: Normal rate, regular rhythm and normal heart sounds. Exam reveals no gallop and no friction rub. No murmur heard. Pulmonary/Chest: Effort normal and breath sounds normal. No stridor. No respiratory distress. He has no wheezes. He has no rales. Abdominal: Soft.  Normal appearance and bowel sounds are normal. He exhibits no distension, no pulsatile liver and no mass. There is no hepatosplenomegaly. There is no tenderness. There is no rebound, no guarding and no CVA tenderness. A hernia is present. Hernia confirmed positive in the ventral area (reducible ventral hernia in epigastrium, marked swelling/asymmetry on right side of abdomen) and confirmed positive in the right inguinal area (reducible). Hernia confirmed negative in the left inguinal area. Genitourinary: Testes normal. Cremasteric reflex is present. Circumcised. Musculoskeletal: Normal range of motion. He exhibits no edema or tenderness. Lymphadenopathy:     He has no cervical adenopathy. Right: No inguinal adenopathy present. Left: No inguinal adenopathy present. Neurological: He is alert and oriented to person, place, and time. No cranial nerve deficit. Gait abnormal. Coordination normal.   Skin: Skin is warm and dry. No rash noted. He is not diaphoretic. No erythema. Psychiatric: He has a normal mood and affect. His behavior is normal. Judgment and thought content normal.       ASSESSMENT and PLAN  1. Symptomatic right inguinal hernia. I explained about the anatomy and pathophysiology of hernias and the risk of incarceration and strangulation of the bowel. I explained about hernia repairs (open with and without mesh, and robotic assisted and laparoscopic with mesh). I explained the risks and benefits of repair including bleeding, infection, chronic pain, orchalgia, loss of testes, bowel or bladder injury, hernia recurrence, seroma, mesh infection requiring removal.  I explained it would be a six to eight week recuperation with no driving for 5 - 7 days, no lifting for six weeks. 2.  Ventral hernia above umbilicus. asymptomatic  3. Right subcostal/flank swelling. Not convinced it is a hernia. 4.  CAD, valvular disease  5. Spinal stenosis with bilateral foot drop  6. Hx falls, uses walker  7.   Social.  Lives alone    We discussed options to repair all the areas but his health is fair and he would benefit from avoiding general anesthesia and the inguinal hernia could be done under MAC and the other areas are not bothersome to him    He desires a right inguinal hernia repair with mesh under MAC as an outpatient with an overnight stay  He lives alone and has mobility issues and wound benefit from an overnight stay    Oneta Burkitt, MD FACS

## 2018-01-10 NOTE — PROGRESS NOTES
Chief Complaint   Patient presents with    Possible Hernia     seenat the request of Dr. Breanne Donald eval hernia     1. Have you been to the ER, urgent care clinic since your last visit? Hospitalized since your last visit? No     2. Have you seen or consulted any other health care providers outside of the [address] [place] [address] since your last visit? Include any pap smears or colon screening.   No     Visit Vitals    /74 (BP 1 Location: Right arm, BP Patient Position: Sitting)    Pulse 87    Temp 98.7 °F (37.1 °C) (Oral)    Resp 16    Ht 6' 2\" (1.88 m)    Wt 220 lb (99.8 kg)    SpO2 97%    BMI 28.25 kg/m2

## 2018-01-18 DIAGNOSIS — I10 ESSENTIAL HYPERTENSION, BENIGN: ICD-10-CM

## 2018-01-18 RX ORDER — AMLODIPINE BESYLATE 5 MG/1
5 TABLET ORAL DAILY
Qty: 90 TAB | Refills: 3 | Status: ON HOLD | OUTPATIENT
Start: 2018-01-18 | End: 2018-01-25

## 2018-01-18 RX ORDER — MOMETASONE FUROATE 1 MG/G
CREAM TOPICAL
Qty: 180 G | Refills: 3 | Status: SHIPPED | OUTPATIENT
Start: 2018-01-18 | End: 2019-01-08 | Stop reason: SDUPTHER

## 2018-01-19 ENCOUNTER — HOSPITAL ENCOUNTER (OUTPATIENT)
Dept: PREADMISSION TESTING | Age: 83
Discharge: HOME | End: 2018-01-19
Attending: SURGERY
Payer: MEDICARE

## 2018-01-19 VITALS
HEART RATE: 82 BPM | RESPIRATION RATE: 18 BRPM | TEMPERATURE: 98.2 F | SYSTOLIC BLOOD PRESSURE: 117 MMHG | WEIGHT: 219.14 LBS | DIASTOLIC BLOOD PRESSURE: 49 MMHG | HEIGHT: 73 IN | BODY MASS INDEX: 29.04 KG/M2 | OXYGEN SATURATION: 96 %

## 2018-01-19 LAB
ANION GAP SERPL CALC-SCNC: 7 MMOL/L (ref 5–15)
APPEARANCE UR: CLEAR
ATRIAL RATE: 79 BPM
BACTERIA URNS QL MICRO: NEGATIVE /HPF
BILIRUB UR QL: NEGATIVE
BUN SERPL-MCNC: 60 MG/DL (ref 6–20)
BUN/CREAT SERPL: 66 (ref 12–20)
CALCIUM SERPL-MCNC: 10.4 MG/DL (ref 8.5–10.1)
CALCULATED P AXIS, ECG09: 6 DEGREES
CALCULATED R AXIS, ECG10: 30 DEGREES
CALCULATED T AXIS, ECG11: 47 DEGREES
CHLORIDE SERPL-SCNC: 104 MMOL/L (ref 97–108)
CO2 SERPL-SCNC: 31 MMOL/L (ref 21–32)
COLOR UR: ABNORMAL
CREAT SERPL-MCNC: 0.91 MG/DL (ref 0.7–1.3)
DIAGNOSIS, 93000: NORMAL
EPITH CASTS URNS QL MICRO: ABNORMAL /LPF
ERYTHROCYTE [DISTWIDTH] IN BLOOD BY AUTOMATED COUNT: 14.6 % (ref 11.5–14.5)
GLUCOSE SERPL-MCNC: 130 MG/DL (ref 65–100)
GLUCOSE UR STRIP.AUTO-MCNC: NEGATIVE MG/DL
HCT VFR BLD AUTO: 41.2 % (ref 36.6–50.3)
HGB BLD-MCNC: 13.1 G/DL (ref 12.1–17)
HGB UR QL STRIP: NEGATIVE
HYALINE CASTS URNS QL MICRO: ABNORMAL /LPF (ref 0–5)
KETONES UR QL STRIP.AUTO: NEGATIVE MG/DL
LEUKOCYTE ESTERASE UR QL STRIP.AUTO: NEGATIVE
MCH RBC QN AUTO: 28.5 PG (ref 26–34)
MCHC RBC AUTO-ENTMCNC: 31.8 G/DL (ref 30–36.5)
MCV RBC AUTO: 89.8 FL (ref 80–99)
NITRITE UR QL STRIP.AUTO: NEGATIVE
P-R INTERVAL, ECG05: 132 MS
PH UR STRIP: 6 [PH] (ref 5–8)
PLATELET # BLD AUTO: 196 K/UL (ref 150–400)
POTASSIUM SERPL-SCNC: 4.5 MMOL/L (ref 3.5–5.1)
PROT UR STRIP-MCNC: 100 MG/DL
Q-T INTERVAL, ECG07: 362 MS
QRS DURATION, ECG06: 92 MS
QTC CALCULATION (BEZET), ECG08: 415 MS
RBC # BLD AUTO: 4.59 M/UL (ref 4.1–5.7)
RBC #/AREA URNS HPF: ABNORMAL /HPF (ref 0–5)
SODIUM SERPL-SCNC: 142 MMOL/L (ref 136–145)
SP GR UR REFRACTOMETRY: 1.02 (ref 1–1.03)
UA: UC IF INDICATED,UAUC: ABNORMAL
UROBILINOGEN UR QL STRIP.AUTO: 0.2 EU/DL (ref 0.2–1)
VENTRICULAR RATE, ECG03: 79 BPM
WBC # BLD AUTO: 7.8 K/UL (ref 4.1–11.1)
WBC URNS QL MICRO: ABNORMAL /HPF (ref 0–4)

## 2018-01-19 PROCEDURE — 80048 BASIC METABOLIC PNL TOTAL CA: CPT | Performed by: SURGERY

## 2018-01-19 PROCEDURE — 93005 ELECTROCARDIOGRAM TRACING: CPT

## 2018-01-19 PROCEDURE — 36415 COLL VENOUS BLD VENIPUNCTURE: CPT | Performed by: SURGERY

## 2018-01-19 PROCEDURE — 81001 URINALYSIS AUTO W/SCOPE: CPT | Performed by: SURGERY

## 2018-01-19 PROCEDURE — 85027 COMPLETE CBC AUTOMATED: CPT | Performed by: SURGERY

## 2018-01-19 RX ORDER — SODIUM CHLORIDE, SODIUM LACTATE, POTASSIUM CHLORIDE, CALCIUM CHLORIDE 600; 310; 30; 20 MG/100ML; MG/100ML; MG/100ML; MG/100ML
25 INJECTION, SOLUTION INTRAVENOUS CONTINUOUS
Status: CANCELLED | OUTPATIENT
Start: 2018-01-25

## 2018-01-19 NOTE — PERIOP NOTES
Left VM for Monika Salmeron in Dr Raven Cool office to clarify that Dr Felix Vizcarra does want to give Ancef 2 Gm pre-op even though the pt is allergic to Penicillin,  it is Flagged by pharmacy as \"Severe  Systemic reaction causing swelling of legs and feet\". Requested call back for confirmation please.  Lupe Jack RN

## 2018-01-21 RX ORDER — BLOOD-GLUCOSE METER
KIT MISCELLANEOUS
Qty: 180 STRIP | Refills: 3 | Status: SHIPPED | OUTPATIENT
Start: 2018-01-21 | End: 2019-01-19 | Stop reason: SDUPTHER

## 2018-01-22 ENCOUNTER — TELEPHONE (OUTPATIENT)
Dept: SURGERY | Age: 83
End: 2018-01-22

## 2018-01-22 RX ORDER — CEFAZOLIN SODIUM/WATER 2 G/20 ML
2 SYRINGE (ML) INTRAVENOUS ONCE
Status: CANCELLED | OUTPATIENT
Start: 2018-01-25 | End: 2018-01-25

## 2018-01-22 NOTE — TELEPHONE ENCOUNTER
Chan Lugo MD   You 5 hours ago (8:32 AM)                 As always, I will address it the day of surgery.  Unless it is anaphylaxis I will give ancef     Chan Lugo MD FACS (Routing comment)               Spoke with April in  [phone] informed her of message above and she verbalized understanding.

## 2018-01-22 NOTE — PERIOP NOTES
Deya Colbert called from Dr Leo Page office and stated \"Dr Ange Espinosa is aware of pt's allergy to PCN and the reaction of swelling of legs and feet and still wants to continue with Ancef am DOS\".  Read back to verify

## 2018-01-22 NOTE — TELEPHONE ENCOUNTER
Rhina Valdovinos called from PAT stating patient is allergic to PCN and wants to know if you still want to give Ancef. Please advise.

## 2018-01-24 RX ORDER — LISINOPRIL 40 MG/1
TABLET ORAL
Qty: 90 TAB | Refills: 3 | Status: SHIPPED | OUTPATIENT
Start: 2018-01-24 | End: 2019-01-19 | Stop reason: SDUPTHER

## 2018-01-24 RX ORDER — FUROSEMIDE 80 MG/1
TABLET ORAL
Qty: 145 TAB | Refills: 3 | Status: SHIPPED | OUTPATIENT
Start: 2018-01-24 | End: 2019-01-19 | Stop reason: SDUPTHER

## 2018-01-25 ENCOUNTER — HOSPITAL ENCOUNTER (OUTPATIENT)
Age: 83
Discharge: HOME | End: 2018-01-26
Attending: SURGERY | Admitting: SURGERY
Payer: MEDICARE

## 2018-01-25 ENCOUNTER — ANESTHESIA (OUTPATIENT)
Dept: SURGERY | Age: 83
End: 2018-01-25
Payer: MEDICARE

## 2018-01-25 ENCOUNTER — ANESTHESIA EVENT (OUTPATIENT)
Dept: SURGERY | Age: 83
End: 2018-01-25
Payer: MEDICARE

## 2018-01-25 DIAGNOSIS — K40.90 RIGHT INGUINAL HERNIA: Primary | ICD-10-CM

## 2018-01-25 LAB
GLUCOSE BLD STRIP.AUTO-MCNC: 114 MG/DL (ref 65–100)
GLUCOSE BLD STRIP.AUTO-MCNC: 123 MG/DL (ref 65–100)
GLUCOSE BLD STRIP.AUTO-MCNC: 127 MG/DL (ref 65–100)
SERVICE CMNT-IMP: ABNORMAL

## 2018-01-25 PROCEDURE — 82962 GLUCOSE BLOOD TEST: CPT

## 2018-01-25 PROCEDURE — 77030002986 HC SUT PROL J&J -A: Performed by: SURGERY

## 2018-01-25 PROCEDURE — 74011250636 HC RX REV CODE- 250/636

## 2018-01-25 PROCEDURE — 77030002996 HC SUT SLK J&J -A: Performed by: SURGERY

## 2018-01-25 PROCEDURE — 77030010507 HC ADH SKN DERMBND J&J -B: Performed by: SURGERY

## 2018-01-25 PROCEDURE — 74011250637 HC RX REV CODE- 250/637: Performed by: SURGERY

## 2018-01-25 PROCEDURE — 77030032490 HC SLV COMPR SCD KNE COVD -B: Performed by: SURGERY

## 2018-01-25 PROCEDURE — 74011000250 HC RX REV CODE- 250

## 2018-01-25 PROCEDURE — 77030011265 HC ELECTRD BLD HEX COVD -A: Performed by: SURGERY

## 2018-01-25 PROCEDURE — 76060000033 HC ANESTHESIA 1 TO 1.5 HR: Performed by: SURGERY

## 2018-01-25 PROCEDURE — C1781 MESH (IMPLANTABLE): HCPCS | Performed by: SURGERY

## 2018-01-25 PROCEDURE — 74011250636 HC RX REV CODE- 250/636: Performed by: SURGERY

## 2018-01-25 PROCEDURE — 77030031139 HC SUT VCRL2 J&J -A: Performed by: SURGERY

## 2018-01-25 PROCEDURE — 76010000149 HC OR TIME 1 TO 1.5 HR: Performed by: SURGERY

## 2018-01-25 PROCEDURE — 74011000250 HC RX REV CODE- 250: Performed by: SURGERY

## 2018-01-25 PROCEDURE — 77030012406 HC DRN WND PENRS BARD -A: Performed by: SURGERY

## 2018-01-25 PROCEDURE — 76210000000 HC OR PH I REC 2 TO 2.5 HR: Performed by: SURGERY

## 2018-01-25 PROCEDURE — 77030011640 HC PAD GRND REM COVD -A: Performed by: SURGERY

## 2018-01-25 PROCEDURE — 88302 TISSUE EXAM BY PATHOLOGIST: CPT | Performed by: SURGERY

## 2018-01-25 PROCEDURE — 74011250636 HC RX REV CODE- 250/636: Performed by: ANESTHESIOLOGY

## 2018-01-25 PROCEDURE — 88304 TISSUE EXAM BY PATHOLOGIST: CPT | Performed by: SURGERY

## 2018-01-25 DEVICE — MESH HERN W7.5XL15CM INGUINAL POLYPR SYN FLAT L PORE MFIL: Type: IMPLANTABLE DEVICE | Site: GROIN | Status: FUNCTIONAL

## 2018-01-25 RX ORDER — FENTANYL CITRATE 50 UG/ML
25 INJECTION, SOLUTION INTRAMUSCULAR; INTRAVENOUS
Status: DISCONTINUED | OUTPATIENT
Start: 2018-01-25 | End: 2018-01-25 | Stop reason: HOSPADM

## 2018-01-25 RX ORDER — PROPOFOL 10 MG/ML
INJECTION, EMULSION INTRAVENOUS
Status: DISCONTINUED | OUTPATIENT
Start: 2018-01-25 | End: 2018-01-25 | Stop reason: HOSPADM

## 2018-01-25 RX ORDER — LIDOCAINE HYDROCHLORIDE 20 MG/ML
INJECTION, SOLUTION EPIDURAL; INFILTRATION; INTRACAUDAL; PERINEURAL AS NEEDED
Status: DISCONTINUED | OUTPATIENT
Start: 2018-01-25 | End: 2018-01-25 | Stop reason: HOSPADM

## 2018-01-25 RX ORDER — BUPIVACAINE HYDROCHLORIDE 5 MG/ML
INJECTION, SOLUTION EPIDURAL; INTRACAUDAL AS NEEDED
Status: DISCONTINUED | OUTPATIENT
Start: 2018-01-25 | End: 2018-01-25 | Stop reason: HOSPADM

## 2018-01-25 RX ORDER — DEXTROSE, SODIUM CHLORIDE, AND POTASSIUM CHLORIDE 5; .45; .15 G/100ML; G/100ML; G/100ML
INJECTION INTRAVENOUS
Status: COMPLETED
Start: 2018-01-25 | End: 2018-01-25

## 2018-01-25 RX ORDER — SODIUM CHLORIDE 0.9 % (FLUSH) 0.9 %
5-10 SYRINGE (ML) INJECTION AS NEEDED
Status: DISCONTINUED | OUTPATIENT
Start: 2018-01-25 | End: 2018-01-26 | Stop reason: HOSPADM

## 2018-01-25 RX ORDER — SODIUM CHLORIDE 0.9 % (FLUSH) 0.9 %
5-10 SYRINGE (ML) INJECTION EVERY 8 HOURS
Status: DISCONTINUED | OUTPATIENT
Start: 2018-01-25 | End: 2018-01-26 | Stop reason: HOSPADM

## 2018-01-25 RX ORDER — SODIUM CHLORIDE 0.9 % (FLUSH) 0.9 %
5-10 SYRINGE (ML) INJECTION AS NEEDED
Status: DISCONTINUED | OUTPATIENT
Start: 2018-01-25 | End: 2018-01-25 | Stop reason: HOSPADM

## 2018-01-25 RX ORDER — ENOXAPARIN SODIUM 100 MG/ML
40 INJECTION SUBCUTANEOUS EVERY 24 HOURS
Status: DISCONTINUED | OUTPATIENT
Start: 2018-01-26 | End: 2018-01-26 | Stop reason: HOSPADM

## 2018-01-25 RX ORDER — CEFAZOLIN SODIUM/WATER 2 G/20 ML
2 SYRINGE (ML) INTRAVENOUS ONCE
Status: COMPLETED | OUTPATIENT
Start: 2018-01-25 | End: 2018-01-25

## 2018-01-25 RX ORDER — DEXTROSE, SODIUM CHLORIDE, AND POTASSIUM CHLORIDE 5; .45; .15 G/100ML; G/100ML; G/100ML
25 INJECTION INTRAVENOUS CONTINUOUS
Status: DISCONTINUED | OUTPATIENT
Start: 2018-01-25 | End: 2018-01-26 | Stop reason: HOSPADM

## 2018-01-25 RX ORDER — PANTOPRAZOLE SODIUM 40 MG/1
40 TABLET, DELAYED RELEASE ORAL
Status: DISCONTINUED | OUTPATIENT
Start: 2018-01-26 | End: 2018-01-26 | Stop reason: HOSPADM

## 2018-01-25 RX ORDER — SODIUM CHLORIDE, SODIUM LACTATE, POTASSIUM CHLORIDE, CALCIUM CHLORIDE 600; 310; 30; 20 MG/100ML; MG/100ML; MG/100ML; MG/100ML
25 INJECTION, SOLUTION INTRAVENOUS CONTINUOUS
Status: DISCONTINUED | OUTPATIENT
Start: 2018-01-25 | End: 2018-01-25 | Stop reason: HOSPADM

## 2018-01-25 RX ORDER — POLYETHYLENE GLYCOL 3350 17 G/17G
17 POWDER, FOR SOLUTION ORAL DAILY
Status: DISCONTINUED | OUTPATIENT
Start: 2018-01-26 | End: 2018-01-26 | Stop reason: HOSPADM

## 2018-01-25 RX ORDER — FENTANYL CITRATE 50 UG/ML
INJECTION, SOLUTION INTRAMUSCULAR; INTRAVENOUS AS NEEDED
Status: DISCONTINUED | OUTPATIENT
Start: 2018-01-25 | End: 2018-01-25 | Stop reason: HOSPADM

## 2018-01-25 RX ORDER — ONDANSETRON 4 MG/1
4 TABLET, ORALLY DISINTEGRATING ORAL
Status: DISCONTINUED | OUTPATIENT
Start: 2018-01-25 | End: 2018-01-26 | Stop reason: HOSPADM

## 2018-01-25 RX ORDER — ATENOLOL 50 MG/1
50 TABLET ORAL DAILY
Status: DISCONTINUED | OUTPATIENT
Start: 2018-01-26 | End: 2018-01-26 | Stop reason: HOSPADM

## 2018-01-25 RX ORDER — HYDROMORPHONE HYDROCHLORIDE 2 MG/ML
.5-1 INJECTION, SOLUTION INTRAMUSCULAR; INTRAVENOUS; SUBCUTANEOUS
Status: DISCONTINUED | OUTPATIENT
Start: 2018-01-25 | End: 2018-01-26 | Stop reason: HOSPADM

## 2018-01-25 RX ORDER — HYDROMORPHONE HYDROCHLORIDE 1 MG/ML
.5-1 INJECTION, SOLUTION INTRAMUSCULAR; INTRAVENOUS; SUBCUTANEOUS
Status: DISCONTINUED | OUTPATIENT
Start: 2018-01-25 | End: 2018-01-25 | Stop reason: SDUPTHER

## 2018-01-25 RX ORDER — HYDROCODONE BITARTRATE AND ACETAMINOPHEN 5; 325 MG/1; MG/1
1 TABLET ORAL
Qty: 20 TAB | Refills: 0 | Status: SHIPPED | OUTPATIENT
Start: 2018-01-25 | End: 2018-02-01

## 2018-01-25 RX ORDER — ATORVASTATIN CALCIUM 40 MG/1
40 TABLET, FILM COATED ORAL
Status: DISCONTINUED | OUTPATIENT
Start: 2018-01-25 | End: 2018-01-26 | Stop reason: HOSPADM

## 2018-01-25 RX ORDER — HYDROCODONE BITARTRATE AND ACETAMINOPHEN 5; 325 MG/1; MG/1
1 TABLET ORAL
Status: DISCONTINUED | OUTPATIENT
Start: 2018-01-25 | End: 2018-01-26 | Stop reason: HOSPADM

## 2018-01-25 RX ORDER — FUROSEMIDE 40 MG/1
80 TABLET ORAL DAILY
Status: DISCONTINUED | OUTPATIENT
Start: 2018-01-26 | End: 2018-01-26 | Stop reason: HOSPADM

## 2018-01-25 RX ORDER — PHENYLEPHRINE HCL IN 0.9% NACL 0.4MG/10ML
SYRINGE (ML) INTRAVENOUS AS NEEDED
Status: DISCONTINUED | OUTPATIENT
Start: 2018-01-25 | End: 2018-01-25 | Stop reason: HOSPADM

## 2018-01-25 RX ORDER — LISINOPRIL 20 MG/1
40 TABLET ORAL DAILY
Status: DISCONTINUED | OUTPATIENT
Start: 2018-01-26 | End: 2018-01-26 | Stop reason: HOSPADM

## 2018-01-25 RX ORDER — METFORMIN HYDROCHLORIDE 500 MG/1
1000 TABLET, EXTENDED RELEASE ORAL 2 TIMES DAILY
Status: DISCONTINUED | OUTPATIENT
Start: 2018-01-25 | End: 2018-01-26 | Stop reason: HOSPADM

## 2018-01-25 RX ORDER — HYDROMORPHONE HYDROCHLORIDE 1 MG/ML
0.2 INJECTION, SOLUTION INTRAMUSCULAR; INTRAVENOUS; SUBCUTANEOUS
Status: DISCONTINUED | OUTPATIENT
Start: 2018-01-25 | End: 2018-01-25 | Stop reason: HOSPADM

## 2018-01-25 RX ORDER — DIPHENHYDRAMINE HYDROCHLORIDE 50 MG/ML
12.5 INJECTION, SOLUTION INTRAMUSCULAR; INTRAVENOUS AS NEEDED
Status: DISCONTINUED | OUTPATIENT
Start: 2018-01-25 | End: 2018-01-25 | Stop reason: HOSPADM

## 2018-01-25 RX ORDER — LIDOCAINE HYDROCHLORIDE 10 MG/ML
0.1 INJECTION, SOLUTION EPIDURAL; INFILTRATION; INTRACAUDAL; PERINEURAL AS NEEDED
Status: DISCONTINUED | OUTPATIENT
Start: 2018-01-25 | End: 2018-01-25 | Stop reason: HOSPADM

## 2018-01-25 RX ORDER — HYDRALAZINE HYDROCHLORIDE 25 MG/1
25 TABLET, FILM COATED ORAL 2 TIMES DAILY
Status: DISCONTINUED | OUTPATIENT
Start: 2018-01-25 | End: 2018-01-26 | Stop reason: HOSPADM

## 2018-01-25 RX ORDER — ONDANSETRON 2 MG/ML
INJECTION INTRAMUSCULAR; INTRAVENOUS AS NEEDED
Status: DISCONTINUED | OUTPATIENT
Start: 2018-01-25 | End: 2018-01-25 | Stop reason: HOSPADM

## 2018-01-25 RX ORDER — SODIUM CHLORIDE 0.9 % (FLUSH) 0.9 %
5-10 SYRINGE (ML) INJECTION EVERY 8 HOURS
Status: DISCONTINUED | OUTPATIENT
Start: 2018-01-25 | End: 2018-01-25 | Stop reason: HOSPADM

## 2018-01-25 RX ORDER — MAGNESIUM SULFATE 100 %
4 CRYSTALS MISCELLANEOUS AS NEEDED
Status: DISCONTINUED | OUTPATIENT
Start: 2018-01-25 | End: 2018-01-26 | Stop reason: HOSPADM

## 2018-01-25 RX ORDER — NALOXONE HYDROCHLORIDE 0.4 MG/ML
0.4 INJECTION, SOLUTION INTRAMUSCULAR; INTRAVENOUS; SUBCUTANEOUS AS NEEDED
Status: DISCONTINUED | OUTPATIENT
Start: 2018-01-25 | End: 2018-01-26 | Stop reason: HOSPADM

## 2018-01-25 RX ORDER — PROPOFOL 10 MG/ML
INJECTION, EMULSION INTRAVENOUS AS NEEDED
Status: DISCONTINUED | OUTPATIENT
Start: 2018-01-25 | End: 2018-01-25 | Stop reason: HOSPADM

## 2018-01-25 RX ORDER — EPHEDRINE SULFATE 50 MG/ML
INJECTION, SOLUTION INTRAVENOUS AS NEEDED
Status: DISCONTINUED | OUTPATIENT
Start: 2018-01-25 | End: 2018-01-25 | Stop reason: HOSPADM

## 2018-01-25 RX ORDER — INSULIN LISPRO 100 [IU]/ML
INJECTION, SOLUTION INTRAVENOUS; SUBCUTANEOUS
Status: DISCONTINUED | OUTPATIENT
Start: 2018-01-25 | End: 2018-01-26 | Stop reason: HOSPADM

## 2018-01-25 RX ORDER — DEXTROSE 50 % IN WATER (D50W) INTRAVENOUS SYRINGE
12.5-25 AS NEEDED
Status: DISCONTINUED | OUTPATIENT
Start: 2018-01-25 | End: 2018-01-26 | Stop reason: HOSPADM

## 2018-01-25 RX ADMIN — ATORVASTATIN CALCIUM 40 MG: 40 TABLET, FILM COATED ORAL at 22:50

## 2018-01-25 RX ADMIN — PROPOFOL 30 MG: 10 INJECTION, EMULSION INTRAVENOUS at 15:35

## 2018-01-25 RX ADMIN — Medication 80 MCG: at 15:49

## 2018-01-25 RX ADMIN — PROPOFOL 50 MCG/KG/MIN: 10 INJECTION, EMULSION INTRAVENOUS at 15:36

## 2018-01-25 RX ADMIN — HYDRALAZINE HYDROCHLORIDE 25 MG: 25 TABLET, FILM COATED ORAL at 21:23

## 2018-01-25 RX ADMIN — DEXTROSE MONOHYDRATE, SODIUM CHLORIDE, AND POTASSIUM CHLORIDE 25 ML/HR: 50; 4.5; 1.49 INJECTION, SOLUTION INTRAVENOUS at 17:22

## 2018-01-25 RX ADMIN — Medication 2 G: at 15:30

## 2018-01-25 RX ADMIN — EPHEDRINE SULFATE 5 MG: 50 INJECTION, SOLUTION INTRAVENOUS at 15:56

## 2018-01-25 RX ADMIN — DEXTROSE, SODIUM CHLORIDE, AND POTASSIUM CHLORIDE 25 ML/HR: 5; .45; .15 INJECTION INTRAVENOUS at 17:22

## 2018-01-25 RX ADMIN — PROPOFOL 40 MG: 10 INJECTION, EMULSION INTRAVENOUS at 15:38

## 2018-01-25 RX ADMIN — Medication 80 MCG: at 15:52

## 2018-01-25 RX ADMIN — FENTANYL CITRATE 50 MCG: 50 INJECTION, SOLUTION INTRAMUSCULAR; INTRAVENOUS at 15:35

## 2018-01-25 RX ADMIN — Medication 10 ML: at 22:52

## 2018-01-25 RX ADMIN — METFORMIN HYDROCHLORIDE 1000 MG: 500 TABLET, EXTENDED RELEASE ORAL at 21:23

## 2018-01-25 RX ADMIN — ONDANSETRON 4 MG: 2 INJECTION INTRAMUSCULAR; INTRAVENOUS at 15:43

## 2018-01-25 RX ADMIN — PROPOFOL 30 MG: 10 INJECTION, EMULSION INTRAVENOUS at 15:43

## 2018-01-25 RX ADMIN — LIDOCAINE HYDROCHLORIDE 20 MG: 20 INJECTION, SOLUTION EPIDURAL; INFILTRATION; INTRACAUDAL; PERINEURAL at 15:35

## 2018-01-25 RX ADMIN — SODIUM CHLORIDE, POTASSIUM CHLORIDE, SODIUM LACTATE AND CALCIUM CHLORIDE: 600; 310; 30; 20 INJECTION, SOLUTION INTRAVENOUS at 14:15

## 2018-01-25 NOTE — OP NOTES
[facility]  ACUTE CARE OP NOTE    Name:Anny RIVAS  MR#: [id number]  : 1935  ACCOUNT #: [de-identified]   DATE OF SERVICE: 2018    PREOPERATIVE DIAGNOSIS:  Right inguinal hernia. POSTOPERATIVE DIAGNOSIS:  Right inguinal hernia. PROCEDURE PERFORMED:  Right inguinal hernia repair with mesh. SURGEON:  Nish Alfaro MD     ASSISTANT:  Ricardo Almodovar     ANESTHESIA:  MAC.    ESTIMATED BLOOD LOSS:  Minimal.    COMPLICATIONS:  None. SPECIMENS:  Hernia sac and lipoma of the cord. FINDINGS:  Indirect right inguinal hernia. IMPLANTS:  Bard soft mesh 3 x 6 inches. BRIEF HISTORY:  The patient and 82year-old gentleman with symptomatic right inguinal hernia. Options were discussed and he elected to undergo repair. He understands the risks and benefits and wished to proceed. PROCEDURE:  The patient was taken to the operating room and placed on the operating table in the supine position, underwent IV sedation and the right groin prepped and draped in usual sterile fashion. After appropriate time out and antibiotics were given, 1% lidocaine with epinephrine and sodium bicarbonate was infiltrated into the skin and subcutaneous tissues 1 cm superiorly and medially with anterior spread across for regional nerve block as well as skin and subcutaneous tissues obliquely in the right groin. Oblique incision was made, subcutaneous tissue was dissected down sharp, a crossing vein was doubly ligated, divided and dissection was carried down through Shira fashion down to the aponeurosis of the external oblique. External oblique was opened along the length of the fibers, opening of the external ring. Cord was mobilized up and there was a large indirect defect and a big lipoma of the cord that was resected away. There was also a direct defect of the floor. There was also a generalized weakness of the abdominal wall musculature.   It looked more whitish rather than the typical reddish appearance of muscle. After resecting the lipoma of the cord away from the cord and ligating and dividing it, we mobilized up the hernia sac and entered the hernia sac. No evidence of a sliding component. A 0 silk pursestring was used for ligation of the hernia sac and the suture ligature was placed distal to that and then the sac amputated off. Next, a 3 x 6 piece of Bard soft mesh on the field. A slit was made on the short end for creation of internal ring and interrupted 0 Prolene suture was used to suture the mesh to the fascia over the pubic tubercle, then up to Brayden's ligament up to the shelving edge of the inguinal ligament more superolaterally. Medially, it was then to the conjoined tendon taking special care not to entrap the nerves in any of the sutures. The tails of the mesh were wrapped around the cord structures and then sewn back to themselves creating a snug but not tight internal ring. Once that was completed, the tails of the mesh were placed under the aponeurosis of the external oblique 0.5 % Marcaine was infiltrated around all the tissues, running 3-0 Vicryl was used to approximate the aponeurosis of the external oblique, creating a new external ring. Another running layer of 3-0 Vicryl was used to approximate Shira's fascia, running layer of 4-0 Vicryl was used to close the skin and then Dermabond dermal dressing was subsequently applied. On completion of the procedure, the needle, sponge and instrument counts were correct x2. Patient tolerated the procedure well and was extubated and brought to recovery. MICHAEL J. Shawnee Landau, MD Rexanne Dills / BETZAIDA  D: 01/25/2018 17:18     T: 01/25/2018 18:02  JOB #: [id number]  CC: Davey Turner MD

## 2018-01-25 NOTE — IP AVS SNAPSHOT
[address] [facility] 
[phone] Patient: Nikole Queen MRN: [id number] About your hospitalization You were admitted on:  January 25, 2018 You last received care in the:  [facility] 2 GENERAL SURGERY You were discharged on:  January 26, 2018 Why you were hospitalized Your primary diagnosis was:  Not on File Your diagnoses also included:  Right Inguinal Hernia Follow-up Information Follow up With Details Comments Contact Info Julia Leger MD   [facility] 
[phone] Your Scheduled Appointments Wednesday February 14, 2018  3:20 PM EST  
POST OP with Arielle Quintanilla MD  
[facility] Forest [facility] ([address]) [address] [place] [address], [address] [facility]  
[phone] Discharge Orders None A check kaykay indicates which time of day the medication should be taken. My Medications START taking these medications Instructions Each Dose to Equal  
 Morning Noon Evening Bedtime HYDROcodone-acetaminophen 5-325 mg per tablet Commonly known as:  Mortimer Newness Your last dose was: Your next dose is: Take 1 Tab by mouth every four (4) hours as needed for Pain for up to 7 days. Max Daily Amount: 6 Tabs. 1 Tab CONTINUE taking these medications Instructions Each Dose to Equal  
 Morning Noon Evening Bedtime ALEVE 220 mg tablet Generic drug:  naproxen sodium Your last dose was: Your next dose is: Take 220 mg by mouth two (2) times daily as needed. 220 mg  
    
   
   
   
  
 aspirin 325 mg tablet Commonly known as:  ASPIRIN Your last dose was: Your next dose is: Take 325 mg by mouth daily. 325 mg  
    
   
   
   
  
 atenolol 50 mg tablet Commonly known as:  TENORMIN Your last dose was: Your next dose is: TAKE 1 TABLET DAILY  
     
   
   
   
  
 atorvastatin 40 mg tablet Commonly known as:  LIPITOR Your last dose was: Your next dose is: TAKE 1 TABLET DAILY  
     
   
   
   
  
 clotrimazole-betamethasone topical cream  
Commonly known as:  Guyann Light Your last dose was: Your next dose is:    
   
   
 Apply  to affected area two (2) times a day. FIBER PO Your last dose was: Your next dose is: Take  by mouth daily. Indications: 2 TBSP daily  
     
   
   
   
  
 fluticasone 50 mcg/actuation nasal spray Commonly known as:  Barber Sheikh Your last dose was: Your next dose is:    
   
   
 USE 2 SPRAYS NASALLY DAILY FREESTYLE LITE METER Your last dose was: Your next dose is:    
   
   
 by Does Not Apply route. FREESTYLE LITE STRIPS strip Generic drug:  glucose blood VI test strips Your last dose was: Your next dose is:    
   
   
 USE ONCE DAILY  
     
   
   
   
  
 furosemide 80 mg tablet Commonly known as:  LASIX Your last dose was: Your next dose is: TAKE ONE AND ONE-HALF TABLETS DAILY  
     
   
   
   
  
 hydrALAZINE 25 mg tablet Commonly known as:  APRESOLINE Your last dose was: Your next dose is: Take 1 Tab by mouth two (2) times a day. 25 mg  
    
   
   
   
  
 LEVITRA 20 mg tablet Generic drug:  vardenafil Your last dose was: Your next dose is: Take 20 mg by mouth as needed. 20 mg  
    
   
   
   
  
 lisinopril 40 mg tablet Commonly known as:  Gerry Ferrera Your last dose was: Your next dose is: TAKE 1 TABLET DAILY  
     
   
   
   
  
 metFORMIN  mg tablet Commonly known as:  GLUCOPHAGE XR Your last dose was: Your next dose is: TAKE 2 TABLETS TWICE A DAY MIRALAX 17 gram/dose powder Generic drug:  polyethylene glycol Your last dose was: Your next dose is: Take 17 g by mouth daily as needed. 17 g  
    
   
   
   
  
 mometasone 0.1 % topical cream  
Commonly known as:  Nicklas Query Your last dose was: Your next dose is:    
   
   
 APPLY 1 APPLICATION TWICE A DAY MULTIPLE VITAMINS tablet Generic drug:  multivitamin Your last dose was: Your next dose is: Take 1 Tab by mouth daily. 1 Tab  
    
   
   
   
  
 nystatin-triamcinolone topical cream  
Commonly known as:  MYCOLOG II Your last dose was: Your next dose is:    
   
   
 Apply  to affected area two (2) times a day. omega-3 acid ethyl esters 1 gram capsule Commonly known as:  Murvin Marine Your last dose was: Your next dose is: TAKE 2 CAPSULES TWICE A DAY  
     
   
   
   
  
 omeprazole 20 mg capsule Commonly known as:  PRILOSEC Your last dose was: Your next dose is: Take 1 Cap by mouth daily for 360 days. 20 mg  
    
   
   
   
  
 VITAMIN D3 1,000 unit Cap Generic drug:  cholecalciferol Your last dose was: Your next dose is: Take 1 Cap by mouth two (2) times a day. 1 Cap Where to Get Your Medications Information on where to get these meds will be given to you by the nurse or doctor. ! Ask your nurse or doctor about these medications HYDROcodone-acetaminophen 5-325 mg per tablet Discharge Instructions Discharge Instructions:  [facility] Dr. Venkat Benítez Call for appointment for follow up in 3 weeks [phone] Activity: Walk regularly. No lifting more than 5 to 10 pounds for 6 weeks. Light aerobic activity is okay when you feel up to it. You may resume driving in five days unless still requiring narcotics for pain. Work: 
 
You may return to work in 2 or 3 weeks to light activity. No lifting more than 5 pounds for four weeks and no more than 10 pounds for an additional 2 weeks. Diet: 
 
You may resume normal diet after 24 hours. Anesthesia and narcotics may cause nausea and vomiting. If persistent please call the office. Wound Care: You have a special dressing called Dermabond. It is okay to shower and let the water run over the incisions but do not scrub the area or soak in a tub. If you have a small amount of drainage you may place a dry bandage over the wound and change it daily. If you experience a lot of drainage, develop redness around the wound, or a fever over 101 F occurs please call the office. May use ice over incision for comfort. Medications: 
 
Resume home medications as indicated on the Medical Reconciliation form. Aspirin and Coumadin can be restarted immediately if you were taking them preoperatively. If taking Plavix do not restart it until post operative day 2. Pain medications:  Non steroidal antiinflammatories seem to work best for post surgical pain. Try these first as prescribed. A narcotic prescription will also be given for breakthrough pain. Over the counter stool softeners and laxatives may be used if needed. Do not hesitate to call with questions or concerns. ACO Transitions of Care Introducing [employer] [address] [facility] offers a voluntary care coordination program to provide high quality service and care to [facility] fee-for-service beneficiaries. Vicente Donaldson was designed to help you enhance your health and well-being through the following services: ? Transitions of Care  support for individuals who are transitioning from one care setting to another (example: Hospital to home). ? Chronic and Complex Care Coordination  support for individuals and caregivers of those with serious or chronic illnesses or with more than one chronic (ongoing) condition and those who take a number of different medications. If you meet specific medical criteria, a [facility] [address] may call you directly to coordinate your care with your primary care physician and your other care providers. For questions about the [facility] programs, please, contact your physicians office. For general questions or additional information about Accountable Care Organizations: 
Please visit www.medicare.gov/acos. html or call 1-800-MEDICARE (2-637.800.9593) TTY users should call 7-389.606.9032. Introducing [facility]! Keaton Garcia introduces VAZATA patient portal. Now you can access parts of your medical record, email your doctor's office, and request medication refills online. 1. In your internet browser, go to [email or web address] 2. Click on the First Time User? Click Here link in the Sign In box. You will see the New Member Sign Up page. 3. Enter your VAZATA Access Code exactly as it appears below. You will not need to use this code after youve completed the sign-up process. If you do not sign up before the expiration date, you must request a new code. · VAZATA Access Code: [email or web address] Expires: 1/29/2018  8:45 AM 
 
4. Enter the last four digits of your Social Security Number (xxxx) and Date of Birth (mm/dd/yyyy) as indicated and click Submit. You will be taken to the next sign-up page. 5. Create a VAZATA ID. This will be your VAZATA login ID and cannot be changed, so think of one that is secure and easy to remember. 6. Create a Hirit password. You can change your password at any time. 7. Enter your Password Reset Question and Answer. This can be used at a later time if you forget your password. 8. Enter your e-mail address. You will receive e-mail notification when new information is available in [address] 9. Click Sign Up. You can now view and download portions of your medical record. 10. Click the Download Summary menu link to download a portable copy of your medical information. If you have questions, please visit the Frequently Asked Questions section of the MyChart website. Remember, [employer]uzmo is NOT to be used for urgent needs. For medical emergencies, dial 911. Now available from your iPhone and Android! Providers Seen During Your Hospitalization Provider Specialty Primary office phone Barak Dubois MD General Surgery [phone] Your Primary Care Physician (PCP) Primary Care Physician Office Phone Office Fax Brittny Toneyhailee [phone] You are allergic to the following Allergen Reactions Pcn (Penicillins) Swelling Swelling of legs & feet Oxycontin (Oxycodone) Other (comments) Agitation/felt irritable Ibuprofen Other (comments) \"Rage\" Niaspan (Niacin) Rash Vesicare (Solifenacin) Other (comments) Attributes: Abdominal pain Recent Documentation Height Weight BMI Smoking Status 1.854 m 98.1 kg 28.53 kg/m2 Former Smoker Emergency Contacts Name Discharge Info Relation Home Work Mobile [address] CAREGIVER [3] Child [2] [phone] Patient Belongings The following personal items are in your possession at time of discharge: 
  Dental Appliances: None  Visual Aid: None   Hearing Aids/Status: Bilateral, At home  Home Medications: None   Jewelry: None  Clothing: Footwear, Socks, Pants, Shirt, Undergarments (To PACU)    Other Valuables:  Other (comment) (Leg braces to PACU, Rollator to daughter)  Personal Items Sent to Safe: Declines Please provide this summary of care documentation to your next provider. Signatures-by signing, you are acknowledging that this After Visit Summary has been reviewed with you and you have received a copy. Patient Signature:  ____________________________________________________________ Date:  ____________________________________________________________  
  
Hursh Och Provider Signature:  ____________________________________________________________ Date:  ____________________________________________________________

## 2018-01-25 NOTE — ANESTHESIA POSTPROCEDURE EVALUATION
Post-Anesthesia Evaluation and Assessment    Patient: Lakisha Lorenzo MRN: [id number]  SSN: [id number]    YOB: 1935  Age: 82 y.o. Sex: male       Cardiovascular Function/Vital Signs  Visit Vitals    /58    Pulse 61    Temp 36.4 °C (97.6 °F)    Resp 15    Ht 6' 1\" (1.854 m)    Wt 98.1 kg (216 lb 4.3 oz)    SpO2 100%    BMI 28.53 kg/m2       Patient is status post general, total IV anesthesia anesthesia for Procedure(s):  RIGHT INGUINAL HERNIA REPAIR WITH MESH. Nausea/Vomiting: None    Postoperative hydration reviewed and adequate. Pain:  Pain Scale 1: Numeric (0 - 10) (01/25/18 1648)  Pain Intensity 1: 0 (01/25/18 1648)   Managed    Neurological Status:   Neuro (WDL): Within Defined Limits (01/25/18 1648)   At baseline    Mental Status and Level of Consciousness: Arousable    Pulmonary Status:   O2 Device: Room air (01/25/18 1703)   Adequate oxygenation and airway patent    Complications related to anesthesia: None    Post-anesthesia assessment completed.  No concerns    Signed By: Meaghan Pisano MD     January 25, 2018

## 2018-01-25 NOTE — ANESTHESIA PREPROCEDURE EVALUATION
Anesthetic History   No history of anesthetic complications            Review of Systems / Medical History  Patient summary reviewed, nursing notes reviewed and pertinent labs reviewed    Pulmonary    COPD            Comments: Former smoker - [address] - 79 pack years   Neuro/Psych             Comments: H/O Syncope  Lumbar spinal stenosis  Lower extremity weakness Cardiovascular    Hypertension  Valvular problems/murmurs: mitral insufficiency and aortic insufficiency      Dysrhythmias         Comments: S/P Left CEA   GI/Hepatic/Renal     GERD    Renal disease  Hiatal hernia, PUD and liver disease    Comments: Right Inguinal Hernia  Dysphagia  Fatty Liver Endo/Other    Diabetes: well controlled, type 2    Arthritis  Pertinent negatives: No morbid obesity   Other Findings   Comments: Vitamin D Deficiency  ED  BPH  Chronic Back Pain         Physical Exam    Airway  Mallampati: II  TM Distance: 4 - 6 cm  Neck ROM: normal range of motion   Mouth opening: Normal     Cardiovascular  Regular rate and rhythm,  S1 and S2 normal,  no murmur, click, rub, or gallop             Dental  No notable dental hx       Pulmonary  Breath sounds clear to auscultation               Abdominal  GI exam deferred       Other Findings            Anesthetic Plan    ASA: 3  Anesthesia type: general and total IV anesthesia    Monitoring Plan: BIS      Induction: Intravenous  Anesthetic plan and risks discussed with: Patient

## 2018-01-25 NOTE — PERIOP NOTES
1320 Called pt as was due at 1230. Pt states sitting in parking lot, waiting for daughter. States will be in to get registered when daughter arrives.

## 2018-01-25 NOTE — INTERVAL H&P NOTE
H&P Update:  Faustino Ulloa was seen and examined. History and physical has been reviewed. The patient has been examined.  There have been no significant clinical changes since the completion of the originally dated History and Physical.    Signed By: Yvonne Stephens MD     January 25, 2018 2:47 PM

## 2018-01-25 NOTE — H&P (VIEW-ONLY)
HISTORY OF PRESENT ILLNESS  Ila Baker is a [de-identified] y.o. male who comes in for consultation by Kaleigh Mcmahon MD for a hernia  HPI  He recently developed acute right groin swelling. The area is very uncomfortable. The area does reduce when laying down. He denies associated nausea, vomiting, diarrhea, constipation, melena, hematochezia. He does have BPH and some urinary hesitancy and frequency. He has not had surgery in the area previously. He also reports above the umbilical area and the right upper abdomen which do not bother him.     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    Chronic pain     low back    CKD (chronic kidney disease) stage 2, GFR 60-89 ml/min     Class 1 obesity due to excess calories with serious comorbidity in adult 12/31/2017    Colon polyp 7/26/2017    Onset Date: 11/2001 Comments: H/O    Compression fracture of L2 lumbar vertebra ([address] 7/26/2017    Problem onset is 1985 Story: L2     COPD (chronic obstructive pulmonary disease) ([address]     DDD (degenerative disc disease)     chronic back pain    Diabetes ([address]     borderline    DJD (degenerative joint disease)     DVT (deep venous thrombosis) ([address]     1998    Dysphagia 7/26/2017    ED (erectile dysfunction) 7/26/2017    Edema extremities 7/26/2017    Fatty liver     Fecal soiling 7/26/2017    Frequent falls     GERD (gastroesophageal reflux disease)     H/O adenomatous polyp of colon     Hearing loss 7/26/2017    Story: wears bilat hearing aids     Heart valve disorder 7/26/2017    Story: mild MR/AI    Hiatal hernia 7/26/2017    Hypercholesterolemia     Hypertension     Incomplete right bundle branch block 7/26/2017    MR (mitral regurgitation)     Overactive bladder     Proteinuria 7/26/2017    Problem onset is 1975     PUD (peptic ulcer disease)     in late teens   [facility] Right shoulder pain 7/26/2017    Risk for falls 7/26/2017    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    Thrombocytopenia (HCC)     Trigger middle finger of left hand 9/17/5436    Umbilical hernia 0/26/6343    Venous insufficiency (chronic) (peripheral)     Venous stasis ulcers ([address] 7/26/2017    Vitamin D deficiency      Past Surgical History:   Procedure Laterality Date    COLONOSCOPY,DIAGNOSTIC  12/4/2014         HX BACK SURGERY  2015    bone spurs removed from lumbar spine (per pt)    HX CAROTID ENDARTERECTOMY      left    HX CATARACT REMOVAL      bilateral lens implant bilateral    HX KNEE REPLACEMENT      bilateral    HX ORTHOPAEDIC      foreign body removal-nail   right leg    MI EGD TRANSORAL BIOPSY SINGLE/MULTIPLE  1/31/2013         TOTAL KNEE ARTHROPLASTY      bilateral    UPPER GI ENDOSCOPY,BALL DIL,30MM  7/27/2017         UPPER GI ENDOSCOPY,BIOPSY  7/27/2017          Family History   Problem Relation Age of Onset    Cancer Mother     Hypertension Mother     Heart Disease Brother     Hypertension Brother     Hypertension Sister     Hypertension Sister      Social History   Substance Use Topics    Smoking status: Former Smoker     Packs/day: 2.00     Years: 35.00     Quit date: 6/19/1998    Smokeless tobacco: Never Used    Alcohol use No      Comment: rarely     Current Outpatient Prescriptions   Medication Sig    atorvastatin (LIPITOR) 40 mg tablet TAKE 1 TABLET DAILY    clotrimazole-betamethasone (LOTRISONE) topical cream Apply  to affected area two (2) times a day.  nystatin-triamcinolone (MYCOLOG II) topical cream Apply  to affected area two (2) times a day.  hydrALAZINE (APRESOLINE) 25 mg tablet Take 1 Tab by mouth two (2) times a day.     fluticasone (FLONASE) 50 mcg/actuation nasal spray USE 2 SPRAYS NASALLY DAILY    BLOOD-GLUCOSE METER (FREESTYLE LITE METER) by Does Not Apply route.  amLODIPine (NORVASC) 5 mg tablet Take 1 Tab by mouth daily.  omega-3 acid ethyl esters (LOVAZA) 1 gram capsule TAKE 2 CAPSULES TWICE A DAY    metFORMIN ER (GLUCOPHAGE XR) 500 mg tablet TAKE 2 TABLETS TWICE A DAY    atenolol (TENORMIN) 50 mg tablet TAKE 1 TABLET DAILY    omeprazole (PRILOSEC) 20 mg capsule Take 1 Cap by mouth daily for 360 days.  vardenafil (LEVITRA) 20 mg tablet Take 20 mg by mouth as needed.  PSYLLIUM SEED, WITH DEXTROSE, (FIBER PO) Take  by mouth daily. Indications: 2 TBSP daily    polyethylene glycol (MIRALAX) 17 gram/dose powder Take 17 g by mouth daily as needed.  naproxen sodium (ALEVE) 220 mg tablet Take 220 mg by mouth two (2) times daily as needed.  furosemide (LASIX) 80 mg tablet Take 120 mg by mouth daily.  lisinopril (PRINIVIL, ZESTRIL) 40 mg tablet Take 1 Tab by mouth every morning. Hold until Blood pressure is greater than 120 mmHg. Have home health nursing check  Indications: HYPERTENSION    mometasone (ELOCON) 0.1 % topical cream Apply  to affected area two (2) times a day. Indications: SKIN RASH    Cholecalciferol, Vitamin D3, (VITAMIN D3) 1,000 unit cap Take 1 Cap by mouth two (2) times a day.  multivitamin (MULTIPLE VITAMINS) tablet Take 1 Tab by mouth daily.  aspirin (ASPIRIN) 325 mg tablet Take 325 mg by mouth daily. No current facility-administered medications for this visit. Allergies   Allergen Reactions    Pcn [Penicillins] Swelling     Swelling of legs & feet    Oxycontin [Oxycodone] Other (comments)     Agitation/felt irritable    Ibuprofen Other (comments)     \"Rage\"    Niaspan [Niacin] Rash    Vesicare [Solifenacin] Other (comments)     Attributes: Abdominal pain       Review of Systems   Constitutional: Negative for chills, diaphoresis, fever, malaise/fatigue and weight loss. HENT: Positive for hearing loss (has hearing aids).  Negative for congestion, ear pain and sore throat. Eyes: Negative for blurred vision and pain. Respiratory: Negative for cough, hemoptysis, sputum production, shortness of breath, wheezing and stridor. Cardiovascular: Positive for leg swelling. Negative for chest pain, palpitations, orthopnea, claudication and PND. Gastrointestinal: Positive for abdominal pain. Negative for blood in stool, constipation, diarrhea, heartburn, melena, nausea and vomiting. Genitourinary: Positive for urgency. Negative for dysuria, flank pain, frequency and hematuria. Musculoskeletal: Negative for joint pain, myalgias and neck pain. Skin: Negative for itching and rash. Neurological: Negative for dizziness, tremors, focal weakness, seizures, weakness and headaches. Endo/Heme/Allergies: Negative for polydipsia. Bruises/bleeds easily. Psychiatric/Behavioral: Negative for depression and memory loss. The patient is not nervous/anxious. Visit Vitals    /74 (BP 1 Location: Right arm, BP Patient Position: Sitting)    Pulse 87    Temp 98.7 °F (37.1 °C) (Oral)    Resp 16    Ht 6' 2\" (1.88 m)    Wt 99.8 kg (220 lb)    SpO2 97%    BMI 28.25 kg/m2       Physical Exam   Constitutional: He is oriented to person, place, and time. He appears well-developed and well-nourished. No distress. HENT:   Head: Normocephalic and atraumatic. Mouth/Throat: Oropharynx is clear and moist. No oropharyngeal exudate. Eyes: Conjunctivae and EOM are normal. Pupils are equal, round, and reactive to light. No scleral icterus. Neck: Normal range of motion. Neck supple. No tracheal deviation present. No thyromegaly present. Cardiovascular: Normal rate, regular rhythm and normal heart sounds. Exam reveals no gallop and no friction rub. No murmur heard. Pulmonary/Chest: Effort normal and breath sounds normal. No stridor. No respiratory distress. He has no wheezes. He has no rales. Abdominal: Soft.  Normal appearance and bowel sounds are normal. He exhibits no distension, no pulsatile liver and no mass. There is no hepatosplenomegaly. There is no tenderness. There is no rebound, no guarding and no CVA tenderness. A hernia is present. Hernia confirmed positive in the ventral area (reducible ventral hernia in epigastrium, marked swelling/asymmetry on right side of abdomen) and confirmed positive in the right inguinal area (reducible). Hernia confirmed negative in the left inguinal area. Genitourinary: Testes normal. Cremasteric reflex is present. Circumcised. Musculoskeletal: Normal range of motion. He exhibits no edema or tenderness. Lymphadenopathy:     He has no cervical adenopathy. Right: No inguinal adenopathy present. Left: No inguinal adenopathy present. Neurological: He is alert and oriented to person, place, and time. No cranial nerve deficit. Gait abnormal. Coordination normal.   Skin: Skin is warm and dry. No rash noted. He is not diaphoretic. No erythema. Psychiatric: He has a normal mood and affect. His behavior is normal. Judgment and thought content normal.       ASSESSMENT and PLAN  1. Symptomatic right inguinal hernia. I explained about the anatomy and pathophysiology of hernias and the risk of incarceration and strangulation of the bowel. I explained about hernia repairs (open with and without mesh, and robotic assisted and laparoscopic with mesh). I explained the risks and benefits of repair including bleeding, infection, chronic pain, orchalgia, loss of testes, bowel or bladder injury, hernia recurrence, seroma, mesh infection requiring removal.  I explained it would be a six to eight week recuperation with no driving for 5 - 7 days, no lifting for six weeks. 2.  Ventral hernia above umbilicus. asymptomatic  3. Right subcostal/flank swelling. Not convinced it is a hernia. 4.  CAD, valvular disease  5. Spinal stenosis with bilateral foot drop  6. Hx falls, uses walker  7.   Social.  Lives alone    We discussed options to repair all the areas but his health is fair and he would benefit from avoiding general anesthesia and the inguinal hernia could be done under MAC and the other areas are not bothersome to him    He desires a right inguinal hernia repair with mesh under MAC as an outpatient with an overnight stay  He lives alone and has mobility issues and wound benefit from an overnight stay    Arielle Quintanilla MD FACS

## 2018-01-25 NOTE — BRIEF OP NOTE
BRIEF OPERATIVE NOTE    Date of Procedure: 1/25/2018   Preoperative Diagnosis: MARIMAR VIGIL [facility] CENTER INGUINAL HERNIA  Postoperative Diagnosis: RIGHTH INGUINAL HERNIA    Procedure(s):  RIGHT INGUINAL HERNIA REPAIR WITH MESH  Surgeon(s) and Role:     * Klever Jack MD - Primary         Assistant Staff: None      Surgical Staff:  Circ-1: Andris Carrel  Scrub Tech-1: Homer Carter  Surg Asst-1: Rashmi Helms  Event Time In   Incision Start 1549   Incision Close 1636     Anesthesia: MAC   Estimated Blood Loss: 20 ml  Specimens:   ID Type Source Tests Collected by Time Destination   1 : [address] AND LIPOMA Preservative Groin  Klever Jack MD 1/25/2018 1603 Pathology      Findings: large indirect and direct herniae   Complications: none  Implants:   Implant Name Type Inv.  Item Serial No.  Lot No. LRB No. Used Action   MESH MADELYN FLAT SHT 7.5X15CM --  - SN/A   MESH MADELYN FLAT SHT 7.5X15CM --  N/A BARD DAVOL [id number] Right 1 Implanted

## 2018-01-25 NOTE — PERIOP NOTES
TRANSFER - OUT REPORT:    Verbal report given to [facility] RN(name) on Emelia Kitchen  being transferred to 2112(unit) for routine post - op       Report consisted of patients Situation, Background, Assessment and   Recommendations(SBAR). Information from the following report(s) SBAR, Kardex, OR Summary, Intake/Output, MAR and Recent Results was reviewed with the receiving nurse. Opportunity for questions and clarification was provided.       Patient transported with:   Tech   Pt allowed to come see pt in PACU, room assignment given, she will be back in am for pt's dc home

## 2018-01-25 NOTE — DISCHARGE INSTRUCTIONS
Discharge Instructions:  Hernia Repair    Dr. Chavo Moon    Call for appointment for follow up in 3 weeks [phone]    Activity:    Walk regularly. No lifting more than 5 to 10 pounds for 6 weeks. Light aerobic activity is okay when you feel up to it. You may resume driving in five days unless still requiring narcotics for pain. Work:    You may return to work in 2 or 3 weeks to light activity. No lifting more than 5 pounds for four weeks and no more than 10 pounds for an additional 2 weeks. Diet:    You may resume normal diet after 24 hours. Anesthesia and narcotics may cause nausea and vomiting. If persistent please call the office. Wound Care: You have a special dressing called Dermabond. It is okay to shower and let the water run over the incisions but do not scrub the area or soak in a tub. If you have a small amount of drainage you may place a dry bandage over the wound and change it daily. If you experience a lot of drainage, develop redness around the wound, or a fever over 101 F occurs please call the office. May use ice over incision for comfort. Medications:    Resume home medications as indicated on the Medical Reconciliation form. Aspirin and Coumadin can be restarted immediately if you were taking them preoperatively. If taking Plavix do not restart it until post operative day 2. Pain medications:  Non steroidal antiinflammatories seem to work best for post surgical pain. Try these first as prescribed. A narcotic prescription will also be given for breakthrough pain. Over the counter stool softeners and laxatives may be used if needed. Do not hesitate to call with questions or concerns.

## 2018-01-26 VITALS
OXYGEN SATURATION: 93 % | HEIGHT: 73 IN | RESPIRATION RATE: 16 BRPM | SYSTOLIC BLOOD PRESSURE: 125 MMHG | DIASTOLIC BLOOD PRESSURE: 50 MMHG | TEMPERATURE: 98.9 F | BODY MASS INDEX: 28.66 KG/M2 | HEART RATE: 77 BPM | WEIGHT: 216.27 LBS

## 2018-01-26 LAB
GLUCOSE BLD STRIP.AUTO-MCNC: 140 MG/DL (ref 65–100)
SERVICE CMNT-IMP: ABNORMAL

## 2018-01-26 PROCEDURE — 74011250637 HC RX REV CODE- 250/637: Performed by: SURGERY

## 2018-01-26 PROCEDURE — 82962 GLUCOSE BLOOD TEST: CPT

## 2018-01-26 PROCEDURE — 74011636637 HC RX REV CODE- 636/637: Performed by: SURGERY

## 2018-01-26 PROCEDURE — 74011250636 HC RX REV CODE- 250/636: Performed by: SURGERY

## 2018-01-26 RX ADMIN — ENOXAPARIN SODIUM 40 MG: 40 INJECTION SUBCUTANEOUS at 08:36

## 2018-01-26 RX ADMIN — HYDRALAZINE HYDROCHLORIDE 25 MG: 25 TABLET, FILM COATED ORAL at 08:35

## 2018-01-26 RX ADMIN — INSULIN LISPRO 2 UNITS: 100 INJECTION, SOLUTION INTRAVENOUS; SUBCUTANEOUS at 08:36

## 2018-01-26 RX ADMIN — POLYETHYLENE GLYCOL 3350 17 G: 17 POWDER, FOR SOLUTION ORAL at 08:36

## 2018-01-26 RX ADMIN — LISINOPRIL 40 MG: 20 TABLET ORAL at 08:35

## 2018-01-26 RX ADMIN — HYDROCODONE BITARTRATE AND ACETAMINOPHEN 1 TABLET: 5; 325 TABLET ORAL at 10:48

## 2018-01-26 RX ADMIN — ATENOLOL 50 MG: 50 TABLET ORAL at 08:50

## 2018-01-26 RX ADMIN — PANTOPRAZOLE SODIUM 40 MG: 40 TABLET, DELAYED RELEASE ORAL at 08:35

## 2018-01-26 RX ADMIN — METFORMIN HYDROCHLORIDE 1000 MG: 500 TABLET, EXTENDED RELEASE ORAL at 08:35

## 2018-01-26 RX ADMIN — FUROSEMIDE 80 MG: 40 TABLET ORAL at 08:35

## 2018-01-26 RX ADMIN — PSYLLIUM HUSK 1 PACKET: 3.4 POWDER ORAL at 08:36

## 2018-01-26 NOTE — PROGRESS NOTES
Admit Date: 2018    POD 1 Day Post-Op    Procedure:  Procedure(s):  RIGHT INGUINAL HERNIA REPAIR WITH MESH      Assessment:   Active Problems:    Right inguinal hernia (2018)      1. Feels ok  2. Pain control adequate  3. Did have some urinary retention requiring cath times one but now voiding ok      Plan/Recommendations/Medical Decision Makin.  discharge    Subjective:     Patient has complaints of pain. Objective:     Blood pressure 125/50, pulse 77, temperature 98.9 °F (37.2 °C), resp. rate 16, height 6' 1\" (1.854 m), weight 98.1 kg (216 lb 4.3 oz), SpO2 93 %. Temp (24hrs), Av.1 °F (36.7 °C), Min:97.6 °F (36.4 °C), Max:98.9 °F (37.2 °C)      Physical Exam:  LUNG: clear to auscultation bilaterally, HEART: regular rate and rhythm, S1, S2 normal, no murmur, click, rub or gallop, ABDOMEN: soft, non-tender.  Bowel sounds normal. No masses,  no organomegaly, incision CDI, mild swelling    Labs:   Recent Results (from the past 48 hour(s))   GLUCOSE, POC    Collection Time: 18  2:03 PM   Result Value Ref Range    Glucose (POC) 123 (H) 65 - 100 mg/dL    Performed by Paige Aldrich    GLUCOSE, POC    Collection Time: 18  4:49 PM   Result Value Ref Range    Glucose (POC) 114 (H) 65 - 100 mg/dL    Performed by [address], POC    Collection Time: 18  9:25 PM   Result Value Ref Range    Glucose (POC) 127 (H) 65 - 100 mg/dL    Performed by Federica Cruz (PCT)    GLUCOSE, POC    Collection Time: 18  7:52 AM   Result Value Ref Range    Glucose (POC) 140 (H) 65 - 100 mg/dL    Performed by Antony Casper (traveler)        Data Review images and reports reviewed

## 2018-01-26 NOTE — PROGRESS NOTES
0715 : Report received from Rancho Garcia, student RN. SBAR, Kardex, Intake/Output, MAR and Recent Results were discussed. Katya Krueger, RN    1100 :   DISCHARGE SUMMARY from Nurse    The following personal items collected during your admission are returned to you:   Dental Appliance: Dental Appliances: None  Vision: Visual Aid: None  Hearing Aid:    Jewelry: Jewelry: None  Clothing: Clothing: Footwear, Socks, Pants, Shirt, Undergarments (To PACU)  Other Valuables: Other Valuables: Other (comment) (Leg braces to PACU, Rollator to daughter)  Valuables sent to safe: Personal Items Sent to Safe: Declines    PATIENT INSTRUCTIONS:    Take Home Medications:  Current Discharge Medication List      START taking these medications    Details   HYDROcodone-acetaminophen (NORCO) 5-325 mg per tablet Take 1 Tab by mouth every four (4) hours as needed for Pain for up to 7 days. Max Daily Amount: 6 Tabs. Qty: 20 Tab, Refills: 0    Associated Diagnoses: Right inguinal hernia         CONTINUE these medications which have NOT CHANGED    Details   lisinopril (PRINIVIL, ZESTRIL) 40 mg tablet TAKE 1 TABLET DAILY  Qty: 90 Tab, Refills: 3      atorvastatin (LIPITOR) 40 mg tablet TAKE 1 TABLET DAILY  Qty: 90 Tab, Refills: 3      clotrimazole-betamethasone (LOTRISONE) topical cream Apply  to affected area two (2) times a day. hydrALAZINE (APRESOLINE) 25 mg tablet Take 1 Tab by mouth two (2) times a day. Qty: 180 Tab, Refills: 3      fluticasone (FLONASE) 50 mcg/actuation nasal spray USE 2 SPRAYS NASALLY DAILY  Qty: 2880 g, Refills: 3      omega-3 acid ethyl esters (LOVAZA) 1 gram capsule TAKE 2 CAPSULES TWICE A DAY  Qty: 360 Cap, Refills: 3      metFORMIN ER (GLUCOPHAGE XR) 500 mg tablet TAKE 2 TABLETS TWICE A DAY  Qty: 360 Tab, Refills: 3      atenolol (TENORMIN) 50 mg tablet TAKE 1 TABLET DAILY  Qty: 90 Tab, Refills: 3      omeprazole (PRILOSEC) 20 mg capsule Take 1 Cap by mouth daily for 360 days.   Qty: 30 Cap, Refills: 11 vardenafil (LEVITRA) 20 mg tablet Take 20 mg by mouth as needed. PSYLLIUM SEED, WITH DEXTROSE, (FIBER PO) Take  by mouth daily. Indications: 2 TBSP daily      naproxen sodium (ALEVE) 220 mg tablet Take 220 mg by mouth two (2) times daily as needed. Cholecalciferol, Vitamin D3, (VITAMIN D3) 1,000 unit cap Take 1 Cap by mouth two (2) times a day. multivitamin (MULTIPLE VITAMINS) tablet Take 1 Tab by mouth daily. aspirin (ASPIRIN) 325 mg tablet Take 325 mg by mouth daily. furosemide (LASIX) 80 mg tablet TAKE ONE AND ONE-HALF TABLETS DAILY  Qty: 145 Tab, Refills: 3      FREESTYLE LITE STRIPS strip USE ONCE DAILY  Qty: 180 Strip, Refills: 3      mometasone (ELOCON) 0.1 % topical cream APPLY 1 APPLICATION TWICE A DAY  Qty: 180 g, Refills: 3      nystatin-triamcinolone (MYCOLOG II) topical cream Apply  to affected area two (2) times a day. Qty: 60 g, Refills: 5      BLOOD-GLUCOSE METER (FREESTYLE LITE METER) by Does Not Apply route. polyethylene glycol (MIRALAX) 17 gram/dose powder Take 17 g by mouth daily as needed. Follow-up Appointments   Procedures    FOLLOW UP VISIT Appointment in: Other (Specify) Three weeks     Three weeks     Standing Status:   Standing     Number of Occurrences:   1     Order Specific Question:   Appointment in     Answer: Other (Specify)       What to do at Home:  Recommended activity: Activity as tolerated    The discharge information has been reviewed with the patient. The patient verbalized understanding.

## 2018-01-26 NOTE — PROGRESS NOTES
General Surgery End of Shift Nursing Note      Bedside shift change report given to Micaela Echevarria 60) by Birdie Montelongo nurse). Report included the following information SBAR, Kardex, Procedure Summary, Intake/Output, MAR, and Recent Results.      Shift worked:  7p to 7a   Summary of shift:  Uneventful night after surgery. Orders for Norco PRN for pain but patient has declined. Patient expressed decreased urge to urinate at 2000; straight cathed with 900 mL. Patient began independently urinating around 0300. Issues for physician to address: None      Number times ambulated in hallway past shift: 0    Number of times OOB to chair past shift: 0      Pain Management:  Current medication: PO Norco (declined)  Patient states pain is manageable on current pain medication: Yes      GI:      Current diet: Clear liquids; Progress to Diabetic diet in am   Tolerating current diet: YES  Passing flatus: YES  Last Bowel Movement: 1/24/18          Respiratory:      Incentive Spirometer at bedside: YES  Patient instructed on use: YES      Patient Safety:      Falls Score: 3  Bed Alarm On? No  Sitter?  No  Lopez Champagne

## 2018-01-26 NOTE — PROGRESS NOTES
Problem: Falls - Risk of  Goal: *Absence of Falls  Document Neto Fall Risk and appropriate interventions in the flowsheet.    Outcome: Progressing Towards Goal  Fall Risk Interventions:  Mobility Interventions: Patient to call before getting OOB, Communicate number of staff needed for ambulation/transfer         Medication Interventions: Assess postural VS orthostatic hypotension, Patient to call before getting OOB, Teach patient to arise slowly         History of Falls Interventions: Door open when patient unattended, Room close to nurse's station

## 2018-02-14 ENCOUNTER — OFFICE VISIT (OUTPATIENT)
Dept: SURGERY | Age: 83
End: 2018-02-14

## 2018-02-14 VITALS
WEIGHT: 220 LBS | OXYGEN SATURATION: 97 % | TEMPERATURE: 96.6 F | HEART RATE: 67 BPM | BODY MASS INDEX: 29.16 KG/M2 | DIASTOLIC BLOOD PRESSURE: 57 MMHG | SYSTOLIC BLOOD PRESSURE: 126 MMHG | RESPIRATION RATE: 20 BRPM | HEIGHT: 73 IN

## 2018-02-14 DIAGNOSIS — Z09 POSTOPERATIVE EXAMINATION: Primary | ICD-10-CM

## 2018-02-14 NOTE — MR AVS SNAPSHOT
[address], [address] [facility], [address] 
[phone] Patient: Mars Eldridge MRN: [id number] SHR:[id number] Visit Information Date & Time Provider Department Dept. Phone Encounter #  
 2/14/2018  3:20 PM Edwardo Mcgraw MD [facility]"" [id number] Your Appointments 4/3/2018 10:30 AM  
FOLLOW UP 10 with [facility] ([facility]) Appt Note: 3 month follow up [phone] [id number] [address] [facility] [address] [address] Upcoming Health Maintenance Date Due  
 EYE EXAM RETINAL OR DILATED Q1 1/25/1945 GLAUCOMA SCREENING Q2Y 1/25/2002 MEDICARE YEARLY EXAM 1/25/2002 DTaP/Tdap/Td series (1 - Tdap) 10/20/2012 HEMOGLOBIN A1C Q6M 6/27/2018 MICROALBUMIN Q1 12/27/2018 LIPID PANEL Q1 12/27/2018 FOOT EXAM Q1 1/3/2019 Allergies as of 2/14/2018  Review Complete On: 2/14/2018 By: Edwardo Mcgraw MD  
  
 Severity Noted Reaction Type Reactions Pcn [Penicillins] High 08/17/2011   Systemic Swelling Swelling of legs & feet Oxycontin [Oxycodone] Medium 06/19/2012   Side Effect Other (comments) Agitation/felt irritable Ibuprofen  03/13/2015    Other (comments) \"Rage\" Niaspan [Niacin]  07/26/2017    Rash Vesicare [Solifenacin]  07/26/2017    Other (comments) Attributes: Abdominal pain Current Immunizations  Reviewed on 3/12/2015 Name Date Hep A Vaccine 9/1/2007 Influenza High Dose Vaccine PF 10/31/2017 Influenza Vaccine 9/12/2014 Pneumococcal Conjugate (PCV-13) 1/19/2015 Pneumococcal Polysaccharide (PPSV-23) 1/3/2012 Pneumococcal Vaccine (Unspecified Type) 9/12/2014 Td 10/19/2012 Tdap 10/19/2012 Zoster Vaccine, Live 10/1/2010 Not reviewed this visit You Were Diagnosed With   
  
 Codes Comments Postoperative examination    -  Primary ICD-10-CM: E95 ICD-9-CM: V67.00 Vitals BP Pulse Temp Resp Height(growth percentile) Weight(growth percentile) 126/57 (BP 1 Location: Left arm, BP Patient Position: Sitting) 67 96.6 °F (35.9 °C) (Oral) 20 6' 1\" (1.854 m) 220 lb (99.8 kg) SpO2 BMI Smoking Status 97% 29.03 kg/m2 Former Smoker BMI and BSA Data Body Mass Index Body Surface Area  
 29.03 kg/m 2 2.27 m 2 Preferred Pharmacy Pharmacy Name Phone [address], [facility] [phone] Your Updated Medication List  
  
   
This list is accurate as of: 2/14/18  3:37 PM.  Always use your most recent med list.  
  
  
  
  
 ALEVE 220 mg tablet Generic drug:  naproxen sodium Take 220 mg by mouth two (2) times daily as needed. aspirin 325 mg tablet Commonly known as:  ASPIRIN Take 325 mg by mouth daily. atenolol 50 mg tablet Commonly known as:  TENORMIN  
TAKE 1 TABLET DAILY  
  
 atorvastatin 40 mg tablet Commonly known as:  LIPITOR  
TAKE 1 TABLET DAILY  
  
 clotrimazole-betamethasone topical cream  
Commonly known as:  Sweetie Rave Apply  to affected area two (2) times a day. FIBER PO Take  by mouth daily. Indications: 2 TBSP daily  
  
 fluticasone 50 mcg/actuation nasal spray Commonly known as:  FLONASE  
USE 2 SPRAYS NASALLY DAILY FREESTYLE LITE METER  
by Does Not Apply route. FREESTYLE LITE STRIPS strip Generic drug:  glucose blood VI test strips USE ONCE DAILY  
  
 furosemide 80 mg tablet Commonly known as:  LASIX TAKE ONE AND ONE-HALF TABLETS DAILY  
  
 hydrALAZINE 25 mg tablet Commonly known as:  APRESOLINE Take 1 Tab by mouth two (2) times a day. LEVITRA 20 mg tablet Generic drug:  vardenafil Take 20 mg by mouth as needed. lisinopril 40 mg tablet Commonly known as:  PRINIVIL, ZESTRIL  
TAKE 1 TABLET DAILY  
  
 metFORMIN  mg tablet Commonly known as:  GLUCOPHAGE XR  
TAKE 2 TABLETS TWICE A DAY MIRALAX 17 gram/dose powder Generic drug:  polyethylene glycol Take 17 g by mouth daily as needed. mometasone 0.1 % topical cream  
Commonly known as:  ELOCON  
APPLY 1 APPLICATION TWICE A DAY MULTIPLE VITAMINS tablet Generic drug:  multivitamin Take 1 Tab by mouth daily. nystatin-triamcinolone topical cream  
Commonly known as:  MYCOLOG II Apply  to affected area two (2) times a day. omega-3 acid ethyl esters 1 gram capsule Commonly known as:  Nica Mosquera TAKE 2 CAPSULES TWICE A DAY  
  
 omeprazole 20 mg capsule Commonly known as:  PRILOSEC Take 1 Cap by mouth daily for 360 days. VITAMIN D3 1,000 unit Cap Generic drug:  cholecalciferol Take 1 Cap by mouth two (2) times a day. Introducing hospitals & HEALTH SERVICES! [employer] introduces [employer] patient portal. Now you can access parts of your medical record, email your doctor's office, and request medication refills online. 1. In your internet browser, go to [email or web address] 2. Click on the First Time User? Click Here link in the Sign In box. You will see the New Member Sign Up page. 3. Enter your [employer] Access Code exactly as it appears below. You will not need to use this code after youve completed the sign-up process. If you do not sign up before the expiration date, you must request a new code. · Dia[employer] Access Code: [email or web address] Expires: 5/15/2018  2:57 PM 
 
4. Enter the last four digits of your Social Security Number (xxxx) and Date of Birth (mm/dd/yyyy) as indicated and click Submit. You will be taken to the next sign-up page. 5. Create a Diavibe ID. This will be your D[employer]ibe login ID and cannot be changed, so think of one that is secure and easy to remember. 6. Create a [employer] password. You can change your password at any time. 7. Enter your Password Reset Question and Answer. This can be used at a later time if you forget your password. 8. Enter your e-mail address. You will receive e-mail notification when new information is available in [address] 9. Click Sign Up. You can now view and download portions of your medical record. 10. Click the Download Summary menu link to download a portable copy of your medical information. If you have questions, please visit the Frequently Asked Questions section of the [employer] website. Remember, [employer] is NOT to be used for urgent needs. For medical emergencies, dial 911. Now available from your iPhone and Android! Please provide this summary of care documentation to your next provider. Your primary care clinician is listed as Niurka Esteban. If you have any questions after today's visit, please call [phone].

## 2018-02-14 NOTE — PROGRESS NOTES
Discussed advanced directive. Patient states that he does not have an advanced directive. 1. Have you been to the ER, urgent care clinic since your last visit?no  Hospitalized since your last visit?no    2. Have you seen or consulted any other health care providers outside of the [address] [place] [address] since your last visit?no  Include any pap smears or colon screening.  no

## 2018-02-14 NOTE — PROGRESS NOTES
Surgery  Follow up  Procedure: [address] with mesh  OR date:  1/25/2018  Path:    sac    S I feel fine, no diarrhea    Visit Vitals    /57 (BP 1 Location: Left arm, BP Patient Position: Sitting)    Pulse 67    Temp 96.6 °F (35.9 °C) (Oral)    Resp 20    Ht 6' 1\" (1.854 m)    Wt 99.8 kg (220 lb)    SpO2 97%    BMI 29.03 kg/m2       O Incisions healing well without infection   No signs of hernia    A/P Doing well   No lifting for another 4 weeks   RTC 6 weeks or prn    Oneta Burkitt, MD FACS

## 2018-03-20 ENCOUNTER — OFFICE VISIT (OUTPATIENT)
Dept: INTERNAL MEDICINE CLINIC | Age: 83
End: 2018-03-20

## 2018-03-20 VITALS
HEIGHT: 73 IN | TEMPERATURE: 97.8 F | BODY MASS INDEX: 29.79 KG/M2 | HEART RATE: 63 BPM | DIASTOLIC BLOOD PRESSURE: 76 MMHG | OXYGEN SATURATION: 95 % | SYSTOLIC BLOOD PRESSURE: 172 MMHG | RESPIRATION RATE: 16 BRPM | WEIGHT: 224.8 LBS

## 2018-03-20 DIAGNOSIS — L30.3 INFECTIOUS ECZEMATOID DERMATITIS: ICD-10-CM

## 2018-03-20 DIAGNOSIS — L30.9 ECZEMA, UNSPECIFIED TYPE: Primary | ICD-10-CM

## 2018-03-20 RX ORDER — CEPHALEXIN 500 MG/1
500 CAPSULE ORAL 3 TIMES DAILY
Qty: 30 CAP | Refills: 0 | Status: SHIPPED | OUTPATIENT
Start: 2018-03-20 | End: 2018-04-03

## 2018-03-20 NOTE — PROGRESS NOTES
Cher Moseley is a 82 y.o. male    Chief Complaint   Patient presents with    Rash     both arms and knees X 5 months it went away for a while and came back worse. Pt states he used cream on it the first time and it helped but now it isnt doing anything       1. Have you been to the ER, urgent care clinic since your last visit? Hospitalized since your last visit? No    2. Have you seen or consulted any other health care providers outside of the [place] [address] since your last visit? Include any pap smears or colon screening.   No    Visit Vitals    /76 (BP 1 Location: Right arm, BP Patient Position: Sitting)    Pulse 63    Temp 97.8 °F (36.6 °C) (Oral)    Resp 16    Ht 6' 1\" (1.854 m)    Wt 224 lb 12.8 oz (102 kg)    SpO2 95%    BMI 29.66 kg/m2

## 2018-03-20 NOTE — PROGRESS NOTES
Subjective:     Mr. Katie Moyer comes to the office today complaining of increasing problems with rash on his left arm. He had some of this approximately three months ago when he was seen and treated with Elocon and it seemed to resolve. Also around that time, however, we began him on Hydralazine as he'd had increasing lower extremity edema related to the Amlodipine and he needed something else for his blood pressure. I'm not sure this isn't a component of the rash now. We are going to hold the hydralazine. He has an infectious component to his eczema in his left forearm now and we'll treat with antibiotics, as well as going back to the Elocon cream.  He is also scheduled for early April so we'll see where his blood pressure stands at that point, as well as how effective we have been in resolving the rash. He does have a dermatologist in the [facility] and will also seek his opinion, but he's not sure he can get an appointment anytime soon.         Patient Active Problem List    Diagnosis Date Noted    Frequent falls 03/13/2015     Priority: 1 - One    Lower extremity weakness 03/13/2015     Priority: 1 - One    Lumbar spinal stenosis 03/13/2015     Priority: 1 - One    Controlled type 2 diabetes mellitus with [facility]albuminuria [facility] 06/27/2012     Priority: 1 - One    Essential hypertension, benign 06/27/2012     Priority: 1 - One    Hyperlipidemia, mixed 06/27/2012     Priority: 1 - One    CVD (cerebrovascular disease) 10/29/2017     Priority: 2 - Two    Proteinuria 07/26/2017     Priority: 2 - Two    Hiatal hernia 07/26/2017     Priority: 2 - Two    Dysphagia 07/26/2017     Priority: 2 - Two    Risk for falls 07/26/2017     Priority: 2 - Two    Heart valve disorder 07/26/2017     Priority: 2 - Two    LBP (low back pain) 03/13/2015     Priority: 2 - Two    BPH (benign prostatic hyperplasia) 10/29/2017     Priority: 3 - Three    Fatty liver 10/29/2017     Priority: 3 - Three    Gastroesophageal reflux disease with esophagitis 10/29/2017     Priority: 3 - Three    Thrombocytopenia (HCC) 10/29/2017     Priority: 3 - Three    Venous stasis ulcers ([facility] 07/26/2017     Priority: 3 - Three    Fecal soiling 07/26/2017     Priority: 3 - Three    Venous insufficiency of both lower extremities 07/26/2017     Priority: 3 - Three    Incomplete right bundle branch block 07/26/2017     Priority: 3 - Three    Chronic back pain 07/26/2017     Priority: 3 - Three    CKD (chronic kidney disease) stage 2, GFR 60-89 ml/min 10/21/2015     Priority: 3 - Three    COPD (chronic obstructive pulmonary disease) ([facility] 06/27/2012     Priority: 3 - Three    Class 1 obesity due to excess calories with serious comorbidity in adult 12/31/2017     Priority: 4 - Four    OAB (overactive bladder) 10/29/2017     Priority: 4 - Four    Colon polyp 07/26/2017     Priority: 4 - Four    Compression fracture of L2 lumbar vertebra ([facility] 07/26/2017     Priority: 4 - Four    Right shoulder pain 07/26/2017     Priority: 4 - Four    Trigger middle finger of left hand 07/26/2017     Priority: 4 - Four    Allergic rhinitis 07/26/2017     Priority: 4 - Four    Tardy palsy of left ulnar nerve 07/26/2017     Priority: 4 - Four    Syncope 03/12/2015     Priority: 4 - Four    DJD (degenerative joint disease) of knee 06/27/2012     Priority: 4 - Four    History of DVT (deep vein thrombosis) 10/29/2017     Priority: 5 - Five    Vitamin D deficiency 10/29/2017     Priority: 5 - Five    Umbilical hernia 14/43/8257     Priority: 5 - Five    ED (erectile dysfunction) 07/26/2017     Priority: 5 - Five    Hearing loss 07/26/2017     Priority: 5 - Five    Right inguinal hernia 01/25/2018    Inguinal hernia of right side without obstruction or gangrene 01/05/2018    PE (physical exam), annual 10/29/2017     Past Surgical History:   Procedure Laterality Date    COLONOSCOPY,DIAGNOSTIC  12/4/2014         HX BACK SURGERY  2015    bone spurs removed from lumbar spine (per pt)    HX CAROTID ENDARTERECTOMY      left    HX CATARACT REMOVAL      bilateral lens implant bilateral    HX HERNIA REPAIR  01/25/2018    Right inguinal hernia repir with mesh    HX ORTHOPAEDIC      foreign body removal-nail   right leg    KS EGD TRANSORAL BIOPSY SINGLE/MULTIPLE  1/31/2013         TOTAL KNEE ARTHROPLASTY      bilateral    UPPER GI ENDOSCOPY,BALL DIL,30MM  7/27/2017         UPPER GI ENDOSCOPY,BIOPSY  7/27/2017           Social History   Substance Use Topics    Smoking status: Former Smoker     Packs/day: 2.00     Years: 35.00     Quit date: 6/19/1998    Smokeless tobacco: Never Used    Alcohol use No      Comment: rarely     Allergies   Allergen Reactions    Pcn [Penicillins] Swelling     Swelling of legs & feet    Oxycontin [Oxycodone] Other (comments)     Agitation/felt irritable    Ibuprofen Other (comments)     \"Rage\"    Niaspan [Niacin] Rash    Vesicare [Solifenacin] Other (comments)     Attributes: Abdominal pain     Outpatient Prescriptions Marked as Taking for the 3/20/18 encounter (Office Visit) with Lizette Shah MD   Medication Sig Dispense Refill    furosemide (LASIX) 80 mg tablet TAKE ONE AND ONE-HALF TABLETS DAILY 145 Tab 3    lisinopril (PRINIVIL, ZESTRIL) 40 mg tablet TAKE 1 TABLET DAILY 90 Tab 3    FREESTYLE LITE STRIPS strip USE ONCE DAILY 180 Strip 3    mometasone (ELOCON) 0.1 % topical cream APPLY 1 APPLICATION TWICE A  g 3    atorvastatin (LIPITOR) 40 mg tablet TAKE 1 TABLET DAILY 90 Tab 3    clotrimazole-betamethasone (LOTRISONE) topical cream Apply  to affected area two (2) times a day.  nystatin-triamcinolone (MYCOLOG II) topical cream Apply  to affected area two (2) times a day. 60 g 5    fluticasone (FLONASE) 50 mcg/actuation nasal spray USE 2 SPRAYS NASALLY DAILY 2880 g 3    BLOOD-GLUCOSE METER (FREESTYLE LITE METER) by Does Not Apply route.       omega-3 acid ethyl esters (LOVAZA) 1 gram capsule TAKE 2 CAPSULES TWICE A  Cap 3    metFORMIN ER (GLUCOPHAGE XR) 500 mg tablet TAKE 2 TABLETS TWICE A  Tab 3    atenolol (TENORMIN) 50 mg tablet TAKE 1 TABLET DAILY 90 Tab 3    omeprazole (PRILOSEC) 20 mg capsule Take 1 Cap by mouth daily for 360 days. 30 Cap 11    vardenafil (LEVITRA) 20 mg tablet Take 20 mg by mouth as needed.  PSYLLIUM SEED, WITH DEXTROSE, (FIBER PO) Take  by mouth daily. Indications: 2 TBSP daily      polyethylene glycol (MIRALAX) 17 gram/dose powder Take 17 g by mouth daily as needed.  naproxen sodium (ALEVE) 220 mg tablet Take 220 mg by mouth two (2) times daily as needed.  Cholecalciferol, Vitamin D3, (VITAMIN D3) 1,000 unit cap Take 1 Cap by mouth two (2) times a day.  multivitamin (MULTIPLE VITAMINS) tablet Take 1 Tab by mouth daily.  aspirin (ASPIRIN) 325 mg tablet Take 325 mg by mouth daily. Review of Systems:  GEN: no weight loss, weight gain, fatigue or night sweats  CV: no PND, orthopnea, or palpitations  Resp: no dyspnea on exertion, no cough  Abd: no nausea, vomiting or diarrhea  Endocrine: no hair loss, excessive thirst or polyuria  Neurological ROS: no TIA or stroke symptoms      Objective:     Visit Vitals    /76 (BP 1 Location: Right arm, BP Patient Position: Sitting)    Pulse 63    Temp 97.8 °F (36.6 °C) (Oral)    Resp 16    Ht 6' 1\" (1.854 m)    Wt 224 lb 12.8 oz (102 kg)    SpO2 95%    BMI 29.66 kg/m2     General:   alert, cooperative and no distress   Eyes: conjunctivae/sclerae clear. PERRL   Mouth:  No oral lesions, no pharyngeal erythema, no exudates   Skin: Eczematoid rash left arm with an infectious component   Heart: S1 and S2 normal,no murmurs noted    Lungs: Clear to auscultation bilaterally, no increased work of breathing   Abdomen: Soft, nontender.   Normal bowel sounds   Extremities: No edema or cyanosis                                Assessment/Plan:       ICD-10-CM ICD-9-CM    1. Eczema, unspecified type L30.9 692.9    2. Infectious eczematoid dermatitis L30.3 690.8      Continue Elocon    Stop hydralazine     No orders of the defined types were placed in this encounter. Follow-up Disposition:  Return for As scheduled.

## 2018-04-03 ENCOUNTER — OFFICE VISIT (OUTPATIENT)
Dept: INTERNAL MEDICINE CLINIC | Age: 83
End: 2018-04-03

## 2018-04-03 VITALS
BODY MASS INDEX: 29.03 KG/M2 | RESPIRATION RATE: 18 BRPM | DIASTOLIC BLOOD PRESSURE: 63 MMHG | TEMPERATURE: 97.8 F | OXYGEN SATURATION: 98 % | HEIGHT: 73 IN | SYSTOLIC BLOOD PRESSURE: 150 MMHG | WEIGHT: 219 LBS | HEART RATE: 64 BPM

## 2018-04-03 DIAGNOSIS — D69.6 THROMBOCYTOPENIA (HCC): ICD-10-CM

## 2018-04-03 DIAGNOSIS — R80.9 CONTROLLED TYPE 2 DIABETES MELLITUS WITH MICROALBUMINURIA, WITHOUT LONG-TERM CURRENT USE OF INSULIN (HCC): Primary | ICD-10-CM

## 2018-04-03 DIAGNOSIS — N18.2 CKD (CHRONIC KIDNEY DISEASE) STAGE 2, GFR 60-89 ML/MIN: ICD-10-CM

## 2018-04-03 DIAGNOSIS — E11.29 CONTROLLED TYPE 2 DIABETES MELLITUS WITH MICROALBUMINURIA, WITHOUT LONG-TERM CURRENT USE OF INSULIN (HCC): Primary | ICD-10-CM

## 2018-04-03 DIAGNOSIS — I10 ESSENTIAL HYPERTENSION, BENIGN: ICD-10-CM

## 2018-04-03 LAB
ALBUMIN SERPL-MCNC: 4 G/DL (ref 3.9–5.4)
ALKALINE PHOS POC: 86 U/L (ref 38–126)
ALT SERPL-CCNC: 33 U/L (ref 9–52)
AST SERPL-CCNC: 28 U/L (ref 14–36)
BUN BLD-MCNC: 44 MG/DL (ref 9–20)
CALCIUM BLD-MCNC: 9.6 MG/DL (ref 8.4–10.2)
CHLORIDE BLD-SCNC: 107 MMOL/L (ref 98–107)
CO2 POC: 25 MMOL/L (ref 22–32)
CREAT BLD-MCNC: 0.5 MG/DL (ref 0.8–1.5)
EGFR (POC): 101.6
GLUCOSE POC: 114 MG/DL (ref 75–110)
GRAN# POC: 5.8 K/UL (ref 2–7.8)
GRAN% POC: 75.1 % (ref 37–92)
HBA1C MFR BLD HPLC: 5.9 % (ref 4.5–5.7)
HCT VFR BLD CALC: 41 % (ref 37–51)
HGB BLD-MCNC: 13.2 G/DL (ref 12–18)
LY# POC: 1.5 K/UL (ref 0.6–4.1)
LY% POC: 20.7 % (ref 10–58.5)
MCH RBC QN: 29.1 PG (ref 26–32)
MCHC RBC-ENTMCNC: 32.3 G/DL (ref 30–36)
MCV RBC: 90 FL (ref 80–97)
MID #, POC: 0.3 K/UL (ref 0–1.8)
MID% POC: 4.2 % (ref 0.1–24)
PLATELET # BLD: 155 K/UL (ref 140–440)
POTASSIUM SERPL-SCNC: 4.5 MMOL/L (ref 3.6–5)
PROT SERPL-MCNC: 7.1 G/DL (ref 6.3–8.2)
RBC # BLD: 4.56 M/UL (ref 4.2–6.3)
SODIUM SERPL-SCNC: 145 MMOL/L (ref 137–145)
TOTAL BILIRUBIN POC: 0.7 MG/DL (ref 0.2–1.3)
WBC # BLD: 7.6 K/UL (ref 4.1–10.9)

## 2018-04-03 RX ORDER — CLONIDINE HYDROCHLORIDE 0.1 MG/1
0.1 TABLET ORAL 2 TIMES DAILY
Qty: 60 TAB | Refills: 1 | Status: SHIPPED | OUTPATIENT
Start: 2018-04-03 | End: 2018-05-02 | Stop reason: SDUPTHER

## 2018-04-03 NOTE — PROGRESS NOTES
Subjective:   Paz Dunn is a 82 y.o. male      Chief Complaint   Patient presents with    Medication Evaluation     3mo follow up        History of present illness:  Mr. Alexandra Saenz returns in follow up. The rash on his arms is almost fully resolved. He has some residual bruising on the arms. Overall he is feeling much better, however. He feels he may have had the UTI that may have been treated with the Keflex as well as his urine no longer has a strong odor that he had before and he's feeling so much better. Blood sugars have been stable per his checks at home. It's time to follow up regarding his A1c, etc.  His BP is elevated now that he's off Hydralazine and we will try Clonidine instead. I'm not inclined to resume the Hydralazine because his rash cleared so readily off of it and with the treatment we rendered last visit. He's still having some issues with arthritis in his knees and still using his walker, but is stable in that regard. His lower extremity edema is only 1-2+ and he continues with compression hose. He has no stasis ulcerations at the present time. Will check his labs, initiate the Clonidine and see him again in a month's time to reevaluate his blood pressure.         Patient Active Problem List    Diagnosis Date Noted    Frequent falls 03/13/2015     Priority: 1 - One    Lower extremity weakness 03/13/2015     Priority: 1 - One    Lumbar spinal stenosis 03/13/2015     Priority: 1 - One    Controlled type 2 diabetes mellitus with microalbuminuria ([facility] 06/27/2012     Priority: 1 - One    Essential hypertension, benign 06/27/2012     Priority: 1 - One    Hyperlipidemia, mixed 06/27/2012     Priority: 1 - One    CVD (cerebrovascular disease) 10/29/2017     Priority: 2 - Two    Proteinuria 07/26/2017     Priority: 2 - Two    Hiatal hernia 07/26/2017     Priority: 2 - Two    Dysphagia 07/26/2017     Priority: 2 - Two    Risk for falls 07/26/2017     Priority: 2 - Two    Heart valve disorder 07/26/2017     Priority: 2 - Two    LBP (low back pain) 03/13/2015     Priority: 2 - Two    BPH (benign prostatic hyperplasia) 10/29/2017     Priority: 3 - Three    Fatty liver 10/29/2017     Priority: 3 - Three    Gastroesophageal reflux disease with esophagitis 10/29/2017     Priority: 3 - Three    Thrombocytopenia ([address] 10/29/2017     Priority: 3 - Three    Venous stasis ulcers ([address] 07/26/2017     Priority: 3 - Three    Fecal soiling 07/26/2017     Priority: 3 - Three    Venous insufficiency of both lower extremities 07/26/2017     Priority: 3 - Three    Incomplete right bundle branch block 07/26/2017     Priority: 3 - Three    Chronic back pain 07/26/2017     Priority: 3 - Three    CKD (chronic kidney disease) stage 2, GFR 60-89 ml/min 10/21/2015     Priority: 3 - Three    COPD (chronic obstructive pulmonary disease) ([address] 06/27/2012     Priority: 3 - Three    Class 1 obesity due to excess calories with serious comorbidity in adult 12/31/2017     Priority: 4 - Four    OAB (overactive bladder) 10/29/2017     Priority: 4 - Four    Colon polyp 07/26/2017     Priority: 4 - Four    Compression fracture of L2 lumbar vertebra ([address] 07/26/2017     Priority: 4 - Four    Right shoulder pain 07/26/2017     Priority: 4 - Four    Trigger middle finger of left hand 07/26/2017     Priority: 4 - Four    Allergic rhinitis 07/26/2017     Priority: 4 - Four    Tardy palsy of left ulnar nerve 07/26/2017     Priority: 4 - Four    Syncope 03/12/2015     Priority: 4 - Four    DJD (degenerative joint disease) of knee 06/27/2012     Priority: 4 - Four    History of DVT (deep vein thrombosis) 10/29/2017     Priority: 5 - Five    Vitamin D deficiency 10/29/2017     Priority: 5 - Five    Umbilical hernia 95/00/4314     Priority: 5 - Five    ED (erectile dysfunction) 07/26/2017     Priority: 5 - Five    Hearing loss 07/26/2017     Priority: 5 - Five    Right inguinal hernia 01/25/2018    Inguinal hernia of right side without obstruction or gangrene 01/05/2018    PE (physical exam), annual 10/29/2017      Past Surgical History:   Procedure Laterality Date    COLONOSCOPY,DIAGNOSTIC  12/4/2014         HX BACK SURGERY  2015    bone spurs removed from lumbar spine (per pt)    HX CAROTID ENDARTERECTOMY      left    HX CATARACT REMOVAL      bilateral lens implant bilateral    HX HERNIA REPAIR  01/25/2018    Right inguinal hernia repir with mesh    HX ORTHOPAEDIC      foreign body removal-nail   right leg    [place] EGD TRANSORAL BIOPSY SINGLE/MULTIPLE  1/31/2013         TOTAL KNEE ARTHROPLASTY      bilateral    UPPER GI ENDOSCOPY,BALL DIL,30MM  7/27/2017         UPPER GI ENDOSCOPY,BIOPSY  7/27/2017           Allergies   Allergen Reactions    Pcn [Penicillins] Swelling     Swelling of legs & feet    Oxycontin [Oxycodone] Other (comments)     Agitation/felt irritable    Ibuprofen Other (comments)     \"Rage\"    Niaspan [Niacin] Rash    Vesicare [Solifenacin] Other (comments)     Attributes: Abdominal pain      Family History   Problem Relation Age of Onset    Cancer Mother     Hypertension Mother     Heart Disease Brother     Hypertension Brother     Hypertension Sister     Hypertension Sister       Social History     Social History    Marital status:      Spouse name: N/A    Number of children: N/A    Years of education: N/A     Occupational History    Not on file.      Social History Main Topics    Smoking status: Former Smoker     Packs/day: 2.00     Years: 35.00     Quit date: 6/19/1998    Smokeless tobacco: Never Used    Alcohol use No      Comment: rarely    Drug use: No    Sexual activity: Not on file     Other Topics Concern    Not on file     Social History Narrative     Outpatient Prescriptions Marked as Taking for the 4/3/18 encounter (Office Visit) with Afua Rodriguez MD   Medication Sig Dispense Refill    cloNIDine HCl (CATAPRES) 0.1 mg tablet Take 1 Tab by mouth two (2) times a day. 60 Tab 1    furosemide (LASIX) 80 mg tablet TAKE ONE AND ONE-HALF TABLETS DAILY 145 Tab 3    lisinopril (PRINIVIL, ZESTRIL) 40 mg tablet TAKE 1 TABLET DAILY 90 Tab 3    FREESTYLE LITE STRIPS strip USE ONCE DAILY 180 Strip 3    mometasone (ELOCON) 0.1 % topical cream APPLY 1 APPLICATION TWICE A  g 3    atorvastatin (LIPITOR) 40 mg tablet TAKE 1 TABLET DAILY 90 Tab 3    clotrimazole-betamethasone (LOTRISONE) topical cream Apply  to affected area two (2) times a day.  nystatin-triamcinolone (MYCOLOG II) topical cream Apply  to affected area two (2) times a day. 60 g 5    fluticasone (FLONASE) 50 mcg/actuation nasal spray USE 2 SPRAYS NASALLY DAILY 2880 g 3    BLOOD-GLUCOSE METER (FREESTYLE LITE METER) by Does Not Apply route.  metFORMIN ER (GLUCOPHAGE XR) 500 mg tablet TAKE 2 TABLETS TWICE A  Tab 3    atenolol (TENORMIN) 50 mg tablet TAKE 1 TABLET DAILY 90 Tab 3    omeprazole (PRILOSEC) 20 mg capsule Take 1 Cap by mouth daily for 360 days. 30 Cap 11    vardenafil (LEVITRA) 20 mg tablet Take 20 mg by mouth as needed.  PSYLLIUM SEED, WITH DEXTROSE, (FIBER PO) Take  by mouth daily. Indications: 2 TBSP daily      polyethylene glycol (MIRALAX) 17 gram/dose powder Take 17 g by mouth daily as needed.  Cholecalciferol, Vitamin D3, (VITAMIN D3) 1,000 unit cap Take 1 Cap by mouth two (2) times a day.  multivitamin (MULTIPLE VITAMINS) tablet Take 1 Tab by mouth daily.  aspirin (ASPIRIN) 325 mg tablet Take 325 mg by mouth daily. Review of Systems              Constitutional:  He denies fever, weight loss, sweats or fatigue. HEENT:  No blurred or double vision, headache or dizziness. No difficulty with swallowing, taste, speech or smell. Respiratory:  No cough, wheezing or shortness of breath. No sputum production. Cardiac:  Denies chest pain, palpitations, unexplained indigestion, syncope, edema, PND or orthopnea.     GI:  No changes in bowel movements, no abdominal pain, no bloating, anorexia, nausea, vomiting or heartburn. :  No frequency or dysuria. Denies incontinence. Extremities: + knee pain, stiffness. Moderate swelling. Skin: rash better  Neurological:  No numbness, tingling, burning paresthesias or loss of motor strength. No syncope, dizziness, frequent headaches or memory loss. Objective:     Vitals:    04/03/18 1025   BP: 150/63   Pulse: 64   Resp: 18   Temp: 97.8 °F (36.6 °C)   TempSrc: Oral   SpO2: 98%   Weight: 219 lb (99.3 kg)   Height: 6' 1\" (1.854 m)   PainSc:   0 - No pain       Body mass index is 28.89 kg/(m^2). Physical Examination:              General Appearance:  Well-developed, well-nourished, no acute  distress. HEENT:     Ears:  The TMs and ear canals were clear. Eyes:  The pupillary responses were normal.  Extraocular muscle function intact. Lids and conjunctiva not injected. Neck:  Supple without thyromegaly or adenopathy. No JVD noted. Lungs:  Clear to auscultation and percussion. Cardiac:  Regular rate and rhythm without murmur. GI: nontender w/o mass. Normal BS's. Extremities:  1+ edema with comp hose;  Rash gone with residual bruising on arms. Skin:  No rash or unusual mole changes noted. Neurological:  Grossly normal.            Assessment/Plan:   Impressions:      ICD-10-CM ICD-9-CM    1. Controlled type 2 diabetes mellitus with microalbuminuria, without long-term current use of insulin ([facility]) E11.29 250.40 AMB POC COMPREHENSIVE METABOLIC PANEL    R49.8 157.2 AMB POC HEMOGLOBIN A1C   2. Thrombocytopenia ([facility]) D69.6 287.5 AMB POC COMPLETE CBC,AUTOMATED ENTER   3. Essential hypertension, benign I10 401.1 AMB POC COMPREHENSIVE METABOLIC PANEL   4. CKD (chronic kidney disease) stage 2, GFR 60-89 ml/min N18.2 585.2 AMB POC COMPREHENSIVE METABOLIC PANEL        Plan:  1. Continue present meds  2.  Discussed lifestyle modifications including Na restriction, low carb/fat diet, weight reduction and exercise (at least a walking program). 3. Clonidine added        Follow-up Disposition:  Return in about 4 weeks (around 5/1/2018). Orders Placed This Encounter    AMB POC COMPREHENSIVE METABOLIC PANEL    AMB POC HEMOGLOBIN A1C    AMB POC COMPLETE CBC,AUTOMATED ENTER    cloNIDine HCl (CATAPRES) 0.1 mg tablet     Sig: Take 1 Tab by mouth two (2) times a day.      Dispense:  60 Tab     Refill:  1       Megan Hardwick MD

## 2018-04-03 NOTE — PROGRESS NOTES
Colleen Primrose is a 82 y.o. male  Chief Complaint   Patient presents with    Medication Evaluation     3mo follow up     Visit Vitals    /63 (BP 1 Location: Left arm, BP Patient Position: Sitting)    Pulse 64    Temp 97.8 °F (36.6 °C) (Oral)    Resp 18    Ht 6' 1\" (1.854 m)    Wt 219 lb (99.3 kg)    SpO2 98%    BMI 28.89 kg/m2     1. Have you been to the ER, urgent care clinic since your last visit? Hospitalized since your last visit?no    2. Have you seen or consulted any other health care providers outside of the [address] [place] [address] since your last visit? Include any pap smears or colon screening.  no

## 2018-04-06 NOTE — PROGRESS NOTES
A1C excellent at  5.9. Hgb and WBC normal.  Platelet count normal.  .   Liver and kidney function normal/stable

## 2018-04-06 NOTE — PROGRESS NOTES
Patient informed that A1C excellent at  5.9. Hgb and WBC normal.  Platelet count normal.  .   Liver and kidney function normal/stable

## 2018-04-08 RX ORDER — HYDRALAZINE HYDROCHLORIDE 25 MG/1
TABLET, FILM COATED ORAL
Qty: 60 TAB | Refills: 2 | Status: SHIPPED | OUTPATIENT
Start: 2018-04-08 | End: 2018-05-02

## 2018-05-02 ENCOUNTER — OFFICE VISIT (OUTPATIENT)
Dept: INTERNAL MEDICINE CLINIC | Age: 83
End: 2018-05-02

## 2018-05-02 VITALS
TEMPERATURE: 97.6 F | DIASTOLIC BLOOD PRESSURE: 68 MMHG | OXYGEN SATURATION: 97 % | HEART RATE: 61 BPM | WEIGHT: 227.8 LBS | RESPIRATION RATE: 18 BRPM | SYSTOLIC BLOOD PRESSURE: 136 MMHG | HEIGHT: 73 IN | BODY MASS INDEX: 30.19 KG/M2

## 2018-05-02 DIAGNOSIS — E11.29 CONTROLLED TYPE 2 DIABETES MELLITUS WITH MICROALBUMINURIA, WITHOUT LONG-TERM CURRENT USE OF INSULIN (HCC): ICD-10-CM

## 2018-05-02 DIAGNOSIS — I10 ESSENTIAL HYPERTENSION, BENIGN: Primary | ICD-10-CM

## 2018-05-02 DIAGNOSIS — R29.6 FREQUENT FALLS: ICD-10-CM

## 2018-05-02 DIAGNOSIS — R80.9 CONTROLLED TYPE 2 DIABETES MELLITUS WITH MICROALBUMINURIA, WITHOUT LONG-TERM CURRENT USE OF INSULIN (HCC): ICD-10-CM

## 2018-05-02 RX ORDER — NYSTATIN AND TRIAMCINOLONE ACETONIDE 100000; 1 [USP'U]/G; MG/G
CREAM TOPICAL 2 TIMES DAILY
Qty: 180 G | Refills: 5 | Status: SHIPPED | OUTPATIENT
Start: 2018-05-02 | End: 2019-06-10 | Stop reason: SDUPTHER

## 2018-05-02 RX ORDER — CLONIDINE HYDROCHLORIDE 0.1 MG/1
0.1 TABLET ORAL 2 TIMES DAILY
Qty: 180 TAB | Refills: 3 | Status: SHIPPED | OUTPATIENT
Start: 2018-05-02 | End: 2019-04-09 | Stop reason: SDUPTHER

## 2018-05-02 NOTE — PROGRESS NOTES
Subjective:   Pepe Garnett is a 82 y.o. male      Chief Complaint   Patient presents with    Medication Evaluation     follow up        History of present illness:  Mr. Jennifer Reed returns in follow up. Primarily he is returning for recheck of his blood pressure since we started Clonidine his last visit. His blood pressure is much improved. He's noting some tiredness in the mid afternoon. He also notes some dry mouth, particularly early in the morning. Beyond that he's tolerating the Clonidine rather well and his blood pressure is improved. He's had another fall since his last visit here and this occurred when he was trying to go into the house and his left leg gave way on him after he was stepping over the threshold. We talked at length about the need to improve his situation at home with assistive devices, including wheelchair type ramp that could make it more easy for him to get in his house or at least more accessible. He's continuing to walk and trying to rehabilitate and improve his strength. Legs still are relatively weak related to his back and his neuropathy, however. He does need to be very careful about how he gets around. He does have his walker with him today, which is his best bet. This walker he has today could also be used if he did have a ramp to get into his home. A lot of his falls occur when he's trying to do other things, such as bring the groceries into the house and handle the cane at the same time. I told him if he gets a ramp and uses a walker with a basket on it he's probably going to have fewer issues with falling as he's trying to get in to the house. At this point his blood pressure is adequate today and I wouldn't change his medications.   Will see him again in two months time for his routine follow up, including fasting labs, etc.        Patient Active Problem List    Diagnosis Date Noted    Frequent falls 03/13/2015     Priority: 1 - One    Lower extremity weakness 03/13/2015 Priority: 1 - One    Lumbar spinal stenosis 03/13/2015     Priority: 1 - One    Controlled type 2 diabetes mellitus with microalbuminuria ([address] 06/27/2012     Priority: 1 - One    Essential hypertension, benign 06/27/2012     Priority: 1 - One    Hyperlipidemia, mixed 06/27/2012     Priority: 1 - One    CVD (cerebrovascular disease) 10/29/2017     Priority: 2 - Two    Proteinuria 07/26/2017     Priority: 2 - Two    Hiatal hernia 07/26/2017     Priority: 2 - Two    Dysphagia 07/26/2017     Priority: 2 - Two    Risk for falls 07/26/2017     Priority: 2 - Two    Heart valve disorder 07/26/2017     Priority: 2 - Two    LBP (low back pain) 03/13/2015     Priority: 2 - Two    BPH (benign prostatic hyperplasia) 10/29/2017     Priority: 3 - Three    Fatty liver 10/29/2017     Priority: 3 - Three    Gastroesophageal reflux disease with esophagitis 10/29/2017     Priority: 3 - Three    Thrombocytopenia ([address] 10/29/2017     Priority: 3 - Three    Venous stasis ulcers ([facility] 07/26/2017     Priority: 3 - Three    Fecal soiling 07/26/2017     Priority: 3 - Three    Venous insufficiency of both lower extremities 07/26/2017     Priority: 3 - Three    Incomplete right bundle branch block 07/26/2017     Priority: 3 - Three    Chronic back pain 07/26/2017     Priority: 3 - Three    CKD (chronic kidney disease) stage 2, GFR 60-89 ml/min 10/21/2015     Priority: 3 - Three    COPD (chronic obstructive pulmonary disease) ([address] 06/27/2012     Priority: 3 - Three    Class 1 obesity due to excess calories with serious comorbidity in adult 12/31/2017     Priority: 4 - Four    OAB (overactive bladder) 10/29/2017     Priority: 4 - Four    Colon polyp 07/26/2017     Priority: 4 - Four    Compression fracture of L2 lumbar vertebra ([address] 07/26/2017     Priority: 4 - Four    Right shoulder pain 07/26/2017     Priority: 4 - Four    Trigger middle finger of left hand 07/26/2017     Priority: 4 - Four    Allergic rhinitis 07/26/2017     Priority: 4 - Four    Tardy palsy of left ulnar nerve 07/26/2017     Priority: 4 - Four    Syncope 03/12/2015     Priority: 4 - Four    DJD (degenerative joint disease) of knee 06/27/2012     Priority: 4 - Four    History of DVT (deep vein thrombosis) 10/29/2017     Priority: 5 - Five    Vitamin D deficiency 10/29/2017     Priority: 5 - Five    Umbilical hernia 16/09/4163     Priority: 5 - Five    ED (erectile dysfunction) 07/26/2017     Priority: 5 - Five    Hearing loss 07/26/2017     Priority: 5 - Five    Right inguinal hernia 01/25/2018    Inguinal hernia of right side without obstruction or gangrene 01/05/2018    PE (physical exam), annual 10/29/2017      Past Surgical History:   Procedure Laterality Date    COLONOSCOPY,DIAGNOSTIC  12/4/2014         HX BACK SURGERY  2015    bone spurs removed from lumbar spine (per pt)    HX CAROTID ENDARTERECTOMY      left    HX CATARACT REMOVAL      bilateral lens implant bilateral    HX HERNIA REPAIR  01/25/2018    Right inguinal hernia repir with mesh    HX ORTHOPAEDIC      foreign body removal-nail   right leg    [place] EGD TRANSORAL BIOPSY SINGLE/MULTIPLE  1/31/2013         TOTAL KNEE ARTHROPLASTY      bilateral    UPPER GI ENDOSCOPY,BALL DIL,30MM  7/27/2017         UPPER GI ENDOSCOPY,BIOPSY  7/27/2017           Allergies   Allergen Reactions    Pcn [Penicillins] Swelling     Swelling of legs & feet    Oxycontin [Oxycodone] Other (comments)     Agitation/felt irritable    Ibuprofen Other (comments)     \"Rage\"    Niaspan [Niacin] Rash    Vesicare [Solifenacin] Other (comments)     Attributes: Abdominal pain      Family History   Problem Relation Age of Onset    Cancer Mother     Hypertension Mother     Heart Disease Brother     Hypertension Brother     Hypertension Sister     Hypertension Sister       Social History     Social History    Marital status:      Spouse name: N/A    Number of children: N/A    Years of education: N/A     Occupational History    Not on file. Social History Main Topics    Smoking status: Former Smoker     Packs/day: 2.00     Years: 35.00     Quit date: 6/19/1998    Smokeless tobacco: Never Used    Alcohol use No      Comment: rarely    Drug use: No    Sexual activity: Not on file     Other Topics Concern    Not on file     Social History Narrative     Outpatient Prescriptions Marked as Taking for the 5/2/18 encounter (Office Visit) with Art Tabares MD   Medication Sig Dispense Refill    cloNIDine HCl (CATAPRES) 0.1 mg tablet Take 1 Tab by mouth two (2) times a day. 180 Tab 3    nystatin-triamcinolone (MYCOLOG II) topical cream Apply  to affected area two (2) times a day. 180 g 5    furosemide (LASIX) 80 mg tablet TAKE ONE AND ONE-HALF TABLETS DAILY 145 Tab 3    lisinopril (PRINIVIL, ZESTRIL) 40 mg tablet TAKE 1 TABLET DAILY 90 Tab 3    FREESTYLE LITE STRIPS strip USE ONCE DAILY 180 Strip 3    mometasone (ELOCON) 0.1 % topical cream APPLY 1 APPLICATION TWICE A  g 3    atorvastatin (LIPITOR) 40 mg tablet TAKE 1 TABLET DAILY 90 Tab 3    clotrimazole-betamethasone (LOTRISONE) topical cream Apply  to affected area two (2) times a day.  fluticasone (FLONASE) 50 mcg/actuation nasal spray USE 2 SPRAYS NASALLY DAILY 2880 g 3    BLOOD-GLUCOSE METER (FREESTYLE LITE METER) by Does Not Apply route.  omega-3 acid ethyl esters (LOVAZA) 1 gram capsule TAKE 2 CAPSULES TWICE A  Cap 3    metFORMIN ER (GLUCOPHAGE XR) 500 mg tablet TAKE 2 TABLETS TWICE A  Tab 3    atenolol (TENORMIN) 50 mg tablet TAKE 1 TABLET DAILY 90 Tab 3    omeprazole (PRILOSEC) 20 mg capsule Take 1 Cap by mouth daily for 360 days. 30 Cap 11    vardenafil (LEVITRA) 20 mg tablet Take 20 mg by mouth as needed.  PSYLLIUM SEED, WITH DEXTROSE, (FIBER PO) Take  by mouth daily.  Indications: 2 TBSP daily      polyethylene glycol (MIRALAX) 17 gram/dose powder Take 17 g by mouth daily as needed.  naproxen sodium (ALEVE) 220 mg tablet Take 220 mg by mouth two (2) times daily as needed.  Cholecalciferol, Vitamin D3, (VITAMIN D3) 1,000 unit cap Take 1 Cap by mouth two (2) times a day.  multivitamin (MULTIPLE VITAMINS) tablet Take 1 Tab by mouth daily.  aspirin (ASPIRIN) 325 mg tablet Take 325 mg by mouth daily. Review of Systems              Constitutional:  He denies fever, weight loss, sweats or fatigue. HEENT:  No blurred or double vision, headache or dizziness. No difficulty with swallowing, taste, speech or smell. Respiratory:  No cough, wheezing or shortness of breath. No sputum production. Cardiac:  Denies chest pain, palpitations, unexplained indigestion, syncope, edema, PND or orthopnea. GI:  No changes in bowel movements, no abdominal pain, no bloating, anorexia, nausea, vomiting or heartburn. :  No frequency or dysuria. Denies incontinence. Extremities:  No joint pain, stiffness or swelling. Skin:  No recent rashes or mole changes. Neurological:  Unsteady gait; Tendency to fall. Objective:     Vitals:    05/02/18 1034 05/02/18 1048   BP: 147/72 136/68   Pulse: 61    Resp: 18    Temp: 97.6 °F (36.4 °C)    TempSrc: Oral    SpO2: 97%    Weight: 227 lb 12.8 oz (103.3 kg)    Height: 6' 1\" (1.854 m)    PainSc:   0 - No pain        Body mass index is 30.05 kg/(m^2). Physical Examination:              General Appearance:  Well-developed, well-nourished, no acute  distress. HEENT:     Ears:  The TMs and ear canals were clear. Eyes:  The pupillary responses were normal.  Extraocular muscle function intact. Lids and conjunctiva not injected. Neck:  Supple without thyromegaly or adenopathy. No JVD noted. Lungs:  Clear to auscultation and percussion. Cardiac:  Regular rate and rhythm without murmur. GI: nontender w/o mass. Normal BS's. Extremities:  1+ edema today. Skin:  No rash or unusual mole changes noted. Neurological:  Gait unsteady and bilat foot drop. Assessment/Plan:   Impressions:      ICD-10-CM ICD-9-CM    1. Essential hypertension, benign I10 401.1    2. Controlled type 2 diabetes mellitus with microalbuminuria, without long-term current use of insulin ([facility]) E11.29 250.40     R80.9 791.0    3. Frequent falls R29.6 V15.88         Plan:  1. Continue present meds  2. Discussed lifestyle modifications including Na restriction, low carb/fat diet, weight reduction and exercise (at least a walking program). 3. Talked re a ramp to use to get him into house mor safely        Follow-up Disposition:  Return in about 2 months (around 7/2/2018) for Fasting. Orders Placed This Encounter    cloNIDine HCl (CATAPRES) 0.1 mg tablet     Sig: Take 1 Tab by mouth two (2) times a day. Dispense:  180 Tab     Refill:  3    nystatin-triamcinolone (MYCOLOG II) topical cream     Sig: Apply  to affected area two (2) times a day.      Dispense:  180 g     Refill:  5       Felicita Santiago MD

## 2018-05-02 NOTE — PROGRESS NOTES
Juan Pérez is a 82 y.o. male  Chief Complaint   Patient presents with    Medication Evaluation     follow up     Visit Vitals    /72 (BP 1 Location: Right arm, BP Patient Position: Sitting)    Pulse 61    Temp 97.6 °F (36.4 °C) (Oral)    Resp 18    Ht 6' 1\" (1.854 m)    Wt 227 lb 12.8 oz (103.3 kg)    SpO2 97%    BMI 30.05 kg/m2     1. Have you been to the ER, urgent care clinic since your last visit? Hospitalized since your last visit?  no    2. Have you seen or consulted any other health care providers outside of the [address] [place] [address] since your last visit? Include any pap smears or colon screening.  no

## 2018-07-02 PROBLEM — Z79.899 LONG TERM CURRENT USE OF HIGH DOSE ACETAMINOPHEN: Status: ACTIVE | Noted: 2018-07-02

## 2018-07-03 ENCOUNTER — OFFICE VISIT (OUTPATIENT)
Dept: INTERNAL MEDICINE CLINIC | Age: 83
End: 2018-07-03

## 2018-07-03 VITALS
OXYGEN SATURATION: 98 % | WEIGHT: 226.2 LBS | HEART RATE: 60 BPM | SYSTOLIC BLOOD PRESSURE: 138 MMHG | HEIGHT: 73 IN | BODY MASS INDEX: 29.98 KG/M2 | DIASTOLIC BLOOD PRESSURE: 70 MMHG | TEMPERATURE: 97.8 F

## 2018-07-03 DIAGNOSIS — I10 ESSENTIAL HYPERTENSION, BENIGN: ICD-10-CM

## 2018-07-03 DIAGNOSIS — E78.2 HYPERLIPIDEMIA, MIXED: ICD-10-CM

## 2018-07-03 DIAGNOSIS — R80.9 CONTROLLED TYPE 2 DIABETES MELLITUS WITH MICROALBUMINURIA, WITHOUT LONG-TERM CURRENT USE OF INSULIN (HCC): Primary | ICD-10-CM

## 2018-07-03 DIAGNOSIS — Z79.899 LONG-TERM CURRENT USE OF HIGH RISK MEDICATION OTHER THAN ANTICOAGULANT: ICD-10-CM

## 2018-07-03 DIAGNOSIS — E11.29 CONTROLLED TYPE 2 DIABETES MELLITUS WITH MICROALBUMINURIA, WITHOUT LONG-TERM CURRENT USE OF INSULIN (HCC): Primary | ICD-10-CM

## 2018-07-03 NOTE — PROGRESS NOTES
Subjective:   Hannah Delatorre is a 82 y.o. male      Chief Complaint   Patient presents with    Hypertension     2mth follow up    Diabetes        History of present illness:  Mr. Elyssa Myles returns in follow-up. He has actually done reasonably well since last visit. His blood pressure is much improved. He feels his sugars have been stable. He denies any shortness of breath, orthopnea, PND, or increasing lower extremity edema, though he does have chronic venous stasis insufficiency and dermatitis. He has been experiencing some headaches when he first wakes up in the morning and this has been associated with some vague dizziness. It tends to resolve fairly readily after he has sat on the side of the bed for short period of time and had a few sips of coffee. He does have the ability to check his blood pressure at home and I have asked him to do that. In addition, he is still having a fair amount of postnasal drainage and I have suggested he increase his Flonase to b.i.d. to see if that will reduce his symptoms which could be related to the postnasal drainage. If not, I would consider overnight oximetry to see if he is becoming hypoxic during the night. He certainly could have sleep apnea for instance. Otherwise, I think he is very stable. He denies new cardiorespiratory, G.I., , or musculoskeletal complaints. He still uses his walker or cane to ambulate as his gait is unstable but he has had no recent falls. We will check his six-month labs today and call him about the results and make any adjustments from there. He will let us know if the headache, etc., persists. He should follow up in 3 months' time otherwise.         Patient Active Problem List    Diagnosis Date Noted    Frequent falls 03/13/2015     Priority: 1 - One    Lower extremity weakness 03/13/2015     Priority: 1 - One    Lumbar spinal stenosis 03/13/2015     Priority: 1 - One    Controlled type 2 diabetes mellitus with microalbuminuria ([facility] 06/27/2012     Priority: 1 - One    Essential hypertension, benign 06/27/2012     Priority: 1 - One    Hyperlipidemia, mixed 06/27/2012     Priority: 1 - One    CVD (cerebrovascular disease) 10/29/2017     Priority: 2 - Two    Proteinuria 07/26/2017     Priority: 2 - Two    Hiatal hernia 07/26/2017     Priority: 2 - Two    Dysphagia 07/26/2017     Priority: 2 - Two    Risk for falls 07/26/2017     Priority: 2 - Two    Heart valve disorder 07/26/2017     Priority: 2 - Two    LBP (low back pain) 03/13/2015     Priority: 2 - Two    Long-term current use of high risk medication other than anticoagulant 07/02/2018     Priority: 3 - Three    BPH (benign prostatic hyperplasia) 10/29/2017     Priority: 3 - Three    Fatty liver 10/29/2017     Priority: 3 - Three    Gastroesophageal reflux disease with esophagitis 10/29/2017     Priority: 3 - Three    Thrombocytopenia ([address] 10/29/2017     Priority: 3 - Three    Venous stasis ulcers ([facility] 07/26/2017     Priority: 3 - Three    Fecal soiling 07/26/2017     Priority: 3 - Three    Venous insufficiency of both lower extremities 07/26/2017     Priority: 3 - Three    Incomplete right bundle branch block 07/26/2017     Priority: 3 - Three    Chronic back pain 07/26/2017     Priority: 3 - Three    CKD (chronic kidney disease) stage 2, GFR 60-89 ml/min 10/21/2015     Priority: 3 - Three    COPD (chronic obstructive pulmonary disease) ([address] 06/27/2012     Priority: 3 - Three    Class 1 obesity due to excess calories with serious comorbidity in adult 12/31/2017     Priority: 4 - Four    OAB (overactive bladder) 10/29/2017     Priority: 4 - Four    Colon polyp 07/26/2017     Priority: 4 - Four    Compression fracture of L2 lumbar vertebra ([address] 07/26/2017     Priority: 4 - Four    Right shoulder pain 07/26/2017     Priority: 4 - Four    Trigger middle finger of left hand 07/26/2017     Priority: 4 - Four    Allergic rhinitis 07/26/2017 Priority: 4 - Four    Tardy palsy of left ulnar nerve 07/26/2017     Priority: 4 - Four    Syncope 03/12/2015     Priority: 4 - Four    DJD (degenerative joint disease) of knee 06/27/2012     Priority: 4 - Four    History of DVT (deep vein thrombosis) 10/29/2017     Priority: 5 - Five    Vitamin D deficiency 10/29/2017     Priority: 5 - Five    Umbilical hernia 18/58/0760     Priority: 5 - Five    ED (erectile dysfunction) 07/26/2017     Priority: 5 - Five    Hearing loss 07/26/2017     Priority: 5 - Five    Right inguinal hernia 01/25/2018    Inguinal hernia of right side without obstruction or gangrene 01/05/2018    PE (physical exam), annual 10/29/2017      Past Surgical History:   Procedure Laterality Date    COLONOSCOPY,DIAGNOSTIC  12/4/2014         HX BACK SURGERY  2015    bone spurs removed from lumbar spine (per pt)    HX CAROTID ENDARTERECTOMY      left    HX CATARACT REMOVAL      bilateral lens implant bilateral    HX HERNIA REPAIR  01/25/2018    Right inguinal hernia repir with mesh    HX ORTHOPAEDIC      foreign body removal-nail   right leg    MO EGD TRANSORAL BIOPSY SINGLE/MULTIPLE  1/31/2013         TOTAL KNEE ARTHROPLASTY      bilateral    UPPER GI ENDOSCOPY,BALL DIL,30MM  7/27/2017         UPPER GI ENDOSCOPY,BIOPSY  7/27/2017           Allergies   Allergen Reactions    Pcn [Penicillins] Swelling     Swelling of legs & feet    Oxycontin [Oxycodone] Other (comments)     Agitation/felt irritable    Ibuprofen Other (comments)     \"Rage\"    Niaspan [Niacin] Rash    Vesicare [Solifenacin] Other (comments)     Attributes: Abdominal pain      Family History   Problem Relation Age of Onset    Cancer Mother     Hypertension Mother     Heart Disease Brother     Hypertension Brother     Hypertension Sister     Hypertension Sister       Social History     Social History    Marital status:      Spouse name: N/A    Number of children: N/A    Years of education: N/A Occupational History    Not on file. Social History Main Topics    Smoking status: Former Smoker     Packs/day: 2.00     Years: 35.00     Quit date: 6/19/1998    Smokeless tobacco: Never Used    Alcohol use No      Comment: rarely    Drug use: No    Sexual activity: Not on file     Other Topics Concern    Not on file     Social History Narrative     Outpatient Prescriptions Marked as Taking for the 7/3/18 encounter (Office Visit) with Radha Deluca MD   Medication Sig Dispense Refill    cloNIDine HCl (CATAPRES) 0.1 mg tablet Take 1 Tab by mouth two (2) times a day. 180 Tab 3    nystatin-triamcinolone (MYCOLOG II) topical cream Apply  to affected area two (2) times a day. 180 g 5    furosemide (LASIX) 80 mg tablet TAKE ONE AND ONE-HALF TABLETS DAILY 145 Tab 3    lisinopril (PRINIVIL, ZESTRIL) 40 mg tablet TAKE 1 TABLET DAILY 90 Tab 3    FREESTYLE LITE STRIPS strip USE ONCE DAILY 180 Strip 3    mometasone (ELOCON) 0.1 % topical cream APPLY 1 APPLICATION TWICE A  g 3    atorvastatin (LIPITOR) 40 mg tablet TAKE 1 TABLET DAILY 90 Tab 3    clotrimazole-betamethasone (LOTRISONE) topical cream Apply  to affected area two (2) times a day.  fluticasone (FLONASE) 50 mcg/actuation nasal spray USE 2 SPRAYS NASALLY DAILY 2880 g 3    BLOOD-GLUCOSE METER (FREESTYLE LITE METER) by Does Not Apply route.  omega-3 acid ethyl esters (LOVAZA) 1 gram capsule TAKE 2 CAPSULES TWICE A  Cap 3    metFORMIN ER (GLUCOPHAGE XR) 500 mg tablet TAKE 2 TABLETS TWICE A  Tab 3    atenolol (TENORMIN) 50 mg tablet TAKE 1 TABLET DAILY 90 Tab 3    omeprazole (PRILOSEC) 20 mg capsule Take 1 Cap by mouth daily for 360 days. 30 Cap 11    vardenafil (LEVITRA) 20 mg tablet Take 20 mg by mouth as needed.  PSYLLIUM SEED, WITH DEXTROSE, (FIBER PO) Take  by mouth daily. Indications: 2 TBSP daily      polyethylene glycol (MIRALAX) 17 gram/dose powder Take 17 g by mouth daily as needed.       naproxen sodium (ALEVE) 220 mg tablet Take 220 mg by mouth two (2) times daily as needed.  Cholecalciferol, Vitamin D3, (VITAMIN D3) 1,000 unit cap Take 1 Cap by mouth two (2) times a day.  multivitamin (MULTIPLE VITAMINS) tablet Take 1 Tab by mouth daily.  aspirin (ASPIRIN) 325 mg tablet Take 325 mg by mouth daily. Review of Systems              Constitutional:  He denies fever, weight loss, sweats or fatigue. HEENT:  No blurred or double vision, headache or dizziness. No difficulty with swallowing, taste, speech or smell. Respiratory:  No cough, wheezing or shortness of breath. No sputum production. Cardiac:  Denies chest pain, palpitations, unexplained indigestion, syncope, edema, PND or orthopnea. GI:  No changes in bowel movements, no abdominal pain, no bloating, anorexia, nausea, vomiting or heartburn. :  No frequency or dysuria. Denies incontinence. Extremities:  No joint pain, stiffness or swelling. Skin:  No recent rashes or mole changes. Neurological: am headache and dizziness. Objective:     Vitals:    07/03/18 0848 07/03/18 0916   BP: 142/70 138/70   Pulse: 60    Temp: 97.8 °F (36.6 °C)    TempSrc: Oral    SpO2: 98%    Weight: 226 lb 3.2 oz (102.6 kg)    Height: 6' 1\" (1.854 m)    PainSc:   0 - No pain        Body mass index is 29.84 kg/(m^2). Physical Examination:              General Appearance:  Well-developed, well-nourished, no acute  distress. HEENT:     Ears:  The TMs and ear canals were clear. Eyes:  The pupillary responses were normal.  Extraocular muscle function intact. Lids and conjunctiva not injected. Neck:  Supple without thyromegaly or adenopathy. No JVD noted. Lungs:  Clear to auscultation and percussion. Cardiac:  Regular rate and rhythm without murmur. GI: nontender w/o mass. Normal BS's. Extremities:  No clubbing, cyanosis;  2+ edema. Skin:  No rash or unusual mole changes noted.     Neurological:  Grossly normal.            Assessment/Plan:   Impressions:      ICD-10-CM ICD-9-CM    1. Controlled type 2 diabetes mellitus with microalbuminuria, without long-term current use of insulin (HCC) L74.59 812.31 METABOLIC PANEL, COMPREHENSIVE    R80.9 791.0 HEMOGLOBIN A1C WITH EAG   2. Essential hypertension, benign T86 742.9 METABOLIC PANEL, COMPREHENSIVE   3. Hyperlipidemia, mixed E78.2 272.2 LIPID PANEL      METABOLIC PANEL, COMPREHENSIVE   4. Long-term current use of high risk medication other than anticoagulant A97.328 R62.35 METABOLIC PANEL, COMPREHENSIVE      CK        Plan:  1. Continue present meds  2. Discussed lifestyle modifications including Na restriction, low carb/fat diet, weight reduction and exercise (at least a walking program). 3. Increase Flonase  4. Call if HA persists  5. Consider overnight oximetry        Follow-up Disposition:  Return in about 3 months (around 10/3/2018).       Orders Placed This Encounter    LIPID PANEL    METABOLIC PANEL, COMPREHENSIVE    HEMOGLOBIN A1C WITH EAG    CK       Evens Maldonado MD

## 2018-07-03 NOTE — PROGRESS NOTES
Reviewed record in preparation for visit and have obtained necessary documentation. Identified pt with two pt identifiers(name and ). Chief Complaint   Patient presents with    Hypertension     2mth follow up    Diabetes        Coordination of Care Questionnaire:  :     1) Have you been to an emergency room, urgent care clinic since your last visit? No     Hospitalized since your last visit? No               2) Have you seen or consulted any other health care providers outside of [address] [place] [address] since your last visit?  No

## 2018-07-04 LAB
ALBUMIN SERPL-MCNC: 4.4 G/DL (ref 3.5–4.7)
ALBUMIN/GLOB SERPL: 1.8 {RATIO} (ref 1.2–2.2)
ALP SERPL-CCNC: 59 IU/L (ref 39–117)
ALT SERPL-CCNC: 17 IU/L (ref 0–44)
AST SERPL-CCNC: 24 IU/L (ref 0–40)
BILIRUB SERPL-MCNC: 0.6 MG/DL (ref 0–1.2)
BUN SERPL-MCNC: 56 MG/DL (ref 8–27)
BUN/CREAT SERPL: 67 (ref 10–24)
CALCIUM SERPL-MCNC: 9.1 MG/DL (ref 8.6–10.2)
CHLORIDE SERPL-SCNC: 105 MMOL/L (ref 96–106)
CHOLEST SERPL-MCNC: 104 MG/DL (ref 100–199)
CK SERPL-CCNC: 319 U/L (ref 24–204)
CO2 SERPL-SCNC: 22 MMOL/L (ref 20–29)
CREAT SERPL-MCNC: 0.83 MG/DL (ref 0.76–1.27)
EST. AVERAGE GLUCOSE BLD GHB EST-MCNC: 123 MG/DL
GLOBULIN SER CALC-MCNC: 2.5 G/DL (ref 1.5–4.5)
GLUCOSE SERPL-MCNC: 120 MG/DL (ref 65–99)
HBA1C MFR BLD: 5.9 % (ref 4.8–5.6)
HDLC SERPL-MCNC: 29 MG/DL
LDLC SERPL CALC-MCNC: 50 MG/DL (ref 0–99)
POTASSIUM SERPL-SCNC: 4.8 MMOL/L (ref 3.5–5.2)
PROT SERPL-MCNC: 6.9 G/DL (ref 6–8.5)
SODIUM SERPL-SCNC: 144 MMOL/L (ref 134–144)
TRIGL SERPL-MCNC: 124 MG/DL (ref 0–149)
VLDLC SERPL CALC-MCNC: 25 MG/DL (ref 5–40)

## 2018-07-05 NOTE — PROGRESS NOTES
Lipids excellent. LDL 50  Total cholesterol 104. A1C excellent at  5.9. Blood sugar, liver and kidney function normal.  No change in Rx.

## 2018-07-05 NOTE — PROGRESS NOTES
Patient informed that Lipids excellent. LDL 50  Total cholesterol 104. A1C excellent at  5.9. Blood sugar, liver and kidney function normal.  No change in Rx.

## 2018-07-26 RX ORDER — ATENOLOL 50 MG/1
TABLET ORAL
Qty: 90 TAB | Refills: 3 | Status: SHIPPED | OUTPATIENT
Start: 2018-07-26 | End: 2019-07-21 | Stop reason: SDUPTHER

## 2018-09-20 RX ORDER — METFORMIN HYDROCHLORIDE 500 MG/1
TABLET, EXTENDED RELEASE ORAL
Qty: 360 TAB | Refills: 3 | Status: SHIPPED | OUTPATIENT
Start: 2018-09-20 | End: 2019-09-16 | Stop reason: SDUPTHER

## 2018-10-02 ENCOUNTER — OFFICE VISIT (OUTPATIENT)
Dept: INTERNAL MEDICINE CLINIC | Age: 83
End: 2018-10-02

## 2018-10-02 VITALS
BODY MASS INDEX: 29.42 KG/M2 | TEMPERATURE: 97.7 F | SYSTOLIC BLOOD PRESSURE: 160 MMHG | WEIGHT: 223 LBS | HEART RATE: 65 BPM | OXYGEN SATURATION: 96 % | DIASTOLIC BLOOD PRESSURE: 73 MMHG

## 2018-10-02 DIAGNOSIS — I10 ESSENTIAL HYPERTENSION, BENIGN: ICD-10-CM

## 2018-10-02 DIAGNOSIS — R80.9 CONTROLLED TYPE 2 DIABETES MELLITUS WITH MICROALBUMINURIA, WITHOUT LONG-TERM CURRENT USE OF INSULIN (HCC): Primary | ICD-10-CM

## 2018-10-02 DIAGNOSIS — N18.2 CKD (CHRONIC KIDNEY DISEASE) STAGE 2, GFR 60-89 ML/MIN: ICD-10-CM

## 2018-10-02 DIAGNOSIS — Z23 ENCOUNTER FOR IMMUNIZATION: ICD-10-CM

## 2018-10-02 DIAGNOSIS — E11.29 CONTROLLED TYPE 2 DIABETES MELLITUS WITH MICROALBUMINURIA, WITHOUT LONG-TERM CURRENT USE OF INSULIN (HCC): Primary | ICD-10-CM

## 2018-10-02 LAB
BUN BLD-MCNC: 51 MG/DL (ref 9–20)
CALCIUM BLD-MCNC: 9.4 MG/DL (ref 8.4–10.2)
CHLORIDE BLD-SCNC: 110 MMOL/L (ref 98–107)
CO2 POC: 23 MMOL/L (ref 22–32)
CREAT BLD-MCNC: 0.6 MG/DL (ref 0.8–1.5)
EGFR (POC): 94.3
GLUCOSE POC: 115 MG/DL (ref 75–110)
POTASSIUM SERPL-SCNC: 5.1 MMOL/L (ref 3.6–5)
SODIUM SERPL-SCNC: 144 MMOL/L (ref 137–145)

## 2018-10-02 NOTE — PROGRESS NOTES
Present for routine immunizations. Patient denies any symptoms , reactions or allergies that would exclude them from being immunized today. Risks and adverse reactions were discussed and the VIS was given to them. All questions were addressed. patient was observed for 10 min post injection. There were no reactions observed.  
 
Troy Farrell LPN

## 2018-10-02 NOTE — PROGRESS NOTES
Subjective:  
Enrique Mcqueen is a 82 y.o. male Chief Complaint Patient presents with [facility] [place] Annual Wellness Visit History of present illness:  Mr. Sarabjit Ross returns in follow up. He's recently lost his caretaker, but fortunately his son is staying with him so he can help him get on his compression hose in the morning. Otherwise he's been very stable and offers no new complaints today. He's had no recent falls. He's not noted increasing problems with orthopnea, PND or other symptoms suggestive of CHF. He denies any current GI symptoms and no change in bowel habits. He still notes some frequency of urination, but nothing severe. He remains on his usual medications . He would like his flu shot today. He's due for follow up regarding his diabetes and will be due for his comprehensive examination in three months' time. I see little to change at this point. Patient Active Problem List  
 Diagnosis Date Noted  Frequent falls 03/13/2015 Priority: 1 - One  Lower extremity weakness 03/13/2015 Priority: 1 - One  Lumbar spinal stenosis 03/13/2015 Priority: 1 - One  
 Controlled type 2 diabetes mellitus with microalbuminuria ([facility] 06/27/2012 Priority: 1 - One  
 Essential hypertension, benign 06/27/2012 Priority: 1 - One  
 Hyperlipidemia, mixed 06/27/2012 Priority: 1 - One  
 CVD (cerebrovascular disease) 10/29/2017 Priority: 2 - Two  Proteinuria 07/26/2017 Priority: 2 - Two  
 Hiatal hernia 07/26/2017 Priority: 2 - Two  Dysphagia 07/26/2017 Priority: 2 - Two  Risk for falls 07/26/2017 Priority: 2 - Two  
 Heart valve disorder 07/26/2017 Priority: 2 - Two  LBP (low back pain) 03/13/2015 Priority: 2 - Two  Long-term current use of high risk medication other than anticoagulant 07/02/2018 Priority: 3 - Three  BPH (benign prostatic hyperplasia) 10/29/2017 Priority: 3 - Three  Fatty liver 10/29/2017 Priority: 3 - Three  Gastroesophageal reflux disease with esophagitis 10/29/2017 Priority: 3 - Three  Thrombocytopenia ([facility] 10/29/2017 Priority: 3 - Three  Venous stasis ulcers ([facility] 07/26/2017 Priority: 3 - Three  Fecal soiling 07/26/2017 Priority: 3 - Three  Venous insufficiency of both lower extremities 07/26/2017 Priority: 3 - Three  Incomplete right bundle branch block 07/26/2017 Priority: 3 - Three  Chronic back pain 07/26/2017 Priority: 3 - Three  CKD (chronic kidney disease) stage 2, GFR 60-89 ml/min 10/21/2015 Priority: 3 - Three  COPD (chronic obstructive pulmonary disease) ([facility] 06/27/2012 Priority: 3 - Three  Class 1 obesity due to excess calories with serious comorbidity in adult 12/31/2017 Priority: 4 - Four  OAB (overactive bladder) 10/29/2017 Priority: 4 - Four  Colon polyp 07/26/2017 Priority: 4 - Four  Compression fracture of L2 lumbar vertebra (HCC) 07/26/2017 Priority: 4 - Four  Right shoulder pain 07/26/2017 Priority: 4 - Four  Trigger middle finger of left hand 07/26/2017 Priority: 4 - Four  Allergic rhinitis 07/26/2017 Priority: 4 - Four  Tardy palsy of left ulnar nerve 07/26/2017 Priority: 4 - Four  Syncope 03/12/2015 Priority: 4 - Four  DJD (degenerative joint disease) of knee 06/27/2012 Priority: 4 - Four  History of DVT (deep vein thrombosis) 10/29/2017 Priority: 5 - Five  Vitamin D deficiency 10/29/2017 Priority: 5 - Five  Umbilical hernia 97/07/5278 Priority: 5 - Five  ED (erectile dysfunction) 07/26/2017 Priority: 5 - Five  Hearing loss 07/26/2017 Priority: 5 - Five  Right inguinal hernia 01/25/2018  Inguinal hernia of right side without obstruction or gangrene 01/05/2018  PE (physical exam), annual 10/29/2017 Past Surgical History:  
Procedure Laterality Date  COLONOSCOPY,DIAGNOSTIC  12/4/2014  HX BACK SURGERY  2015  
 bone spurs removed from lumbar spine (per pt)  HX CAROTID ENDARTERECTOMY    
 left  HX CATARACT REMOVAL    
 bilateral lens implant bilateral  
 HX HERNIA REPAIR  01/25/2018 Right inguinal hernia repir with mesh  HX ORTHOPAEDIC    
 foreign body removal-nail   right leg  OK EGD TRANSORAL BIOPSY SINGLE/MULTIPLE  1/31/2013  TOTAL KNEE ARTHROPLASTY    
 bilateral  
 UPPER GI ENDOSCOPY,BALL DIL,30MM  7/27/2017  UPPER GI ENDOSCOPY,BIOPSY  7/27/2017 Allergies Allergen Reactions  Pcn [Penicillins] Swelling Swelling of legs & feet  Oxycontin [Oxycodone] Other (comments) Agitation/felt irritable  Ibuprofen Other (comments) \"Rage\"  Niaspan [Niacin] Rash  Vesicare [Solifenacin] Other (comments) Attributes: Abdominal pain Family History Problem Relation Age of Onset  Cancer Mother  Hypertension Mother  Heart Disease Brother  Hypertension Brother  Hypertension Sister  Hypertension Sister Social History Social History  Marital status:  Spouse name: N/A  
 Number of children: N/A  
 Years of education: N/A Occupational History  Not on file. Social History Main Topics  Smoking status: Former Smoker Packs/day: 2.00 Years: 35.00 Quit date: 6/19/1998  Smokeless tobacco: Never Used  Alcohol use No  
   Comment: rarely  Drug use: No  
 Sexual activity: Not on file Other Topics Concern  Not on file Social History Narrative Outpatient Prescriptions Marked as Taking for the 10/2/18 encounter (Office Visit) with Ramon Schwartz MD  
Medication Sig Dispense Refill  metFORMIN ER (GLUCOPHAGE XR) 500 mg tablet TAKE 2 TABLETS TWICE A  Tab 3  
 atenolol (TENORMIN) 50 mg tablet TAKE 1 TABLET DAILY 90 Tab 3  cloNIDine HCl (CATAPRES) 0.1 mg tablet Take 1 Tab by mouth two (2) times a day.  180 Tab 3  
  nystatin-triamcinolone (MYCOLOG II) topical cream Apply  to affected area two (2) times a day. 180 g 5  furosemide (LASIX) 80 mg tablet TAKE ONE AND ONE-HALF TABLETS DAILY 145 Tab 3  
 lisinopril (PRINIVIL, ZESTRIL) 40 mg tablet TAKE 1 TABLET DAILY 90 Tab 3  
 FREESTYLE LITE STRIPS strip USE ONCE DAILY 180 Strip 3  
 mometasone (ELOCON) 0.1 % topical cream APPLY 1 APPLICATION TWICE A  g 3  
 atorvastatin (LIPITOR) 40 mg tablet TAKE 1 TABLET DAILY 90 Tab 3  clotrimazole-betamethasone (LOTRISONE) topical cream Apply  to affected area two (2) times a day.  fluticasone (FLONASE) 50 mcg/actuation nasal spray USE 2 SPRAYS NASALLY DAILY 2880 g 3  
 BLOOD-GLUCOSE METER (FREESTYLE LITE METER) by Does Not Apply route.  omega-3 acid ethyl esters (LOVAZA) 1 gram capsule TAKE 2 CAPSULES TWICE A  Cap 3  
 PSYLLIUM SEED, WITH DEXTROSE, (FIBER PO) Take  by mouth daily. Indications: 2 TBSP daily  polyethylene glycol (MIRALAX) 17 gram/dose powder Take 17 g by mouth daily as needed.  naproxen sodium (ALEVE) 220 mg tablet Take 220 mg by mouth two (2) times daily as needed.  Cholecalciferol, Vitamin D3, (VITAMIN D3) 1,000 unit cap Take 1 Cap by mouth two (2) times a day.  multivitamin (MULTIPLE VITAMINS) tablet Take 1 Tab by mouth daily.  aspirin (ASPIRIN) 325 mg tablet Take 325 mg by mouth daily. Review of Systems Constitutional:  He denies fever, weight loss, sweats or fatigue. HEENT:  No blurred or double vision, headache or dizziness. No difficulty with swallowing, taste, speech or smell. Respiratory:  No cough, wheezing or shortness of breath. No sputum production. Cardiac:  Denies chest pain, palpitations, unexplained indigestion, syncope, edema, PND or orthopnea. GI:  No changes in bowel movements, no abdominal pain, no bloating, anorexia, nausea, vomiting or heartburn. :  No frequency or dysuria. Denies incontinence. Extremities:  No joint pain, stiffness or swelling. Skin:  No recent rashes or mole changes. Neurological:  No numbness, tingling, burning paresthesias or loss of motor strength. No syncope, dizziness, frequent headaches or memory loss. Objective:  
 
Vitals:  
 10/02/18 1055 BP: 160/73 Pulse: 65 Temp: 97.7 °F (36.5 °C) TempSrc: Oral  
SpO2: 96% Weight: 223 lb (101.2 kg) Body mass index is 29.42 kg/(m^2). Physical Examination:  
           General Appearance:  Well-developed, well-nourished, no acute  distress. HEENT:   
 Ears:  The TMs and ear canals were clear. Eyes:  The pupillary responses were normal.  Extraocular muscle function intact. Lids and conjunctiva not injected. Neck:  Supple without thyromegaly or adenopathy. No JVD noted. Lungs:  Clear to auscultation and percussion. Cardiac:  Regular rate and rhythm without murmur. GI: nontender w/o mass. Normal BS's. Extremities:  No clubbing, cyanosis or edema. Skin:  No rash or unusual mole changes noted. Neurological:  Grossly normal.     
 
 
 
Assessment/Plan:  
Impressions: ICD-10-CM ICD-9-CM 1. Controlled type 2 diabetes mellitus with microalbuminuria, without long-term current use of insulin ([facility]) E11.29 250.40 AMB POC BASIC METABOLIC PANEL  
 B96.2 763.6 AMB POC HEMOGLOBIN A1C  
2. Essential hypertension, benign I10 401.1 AMB POC BASIC METABOLIC PANEL 3. CKD (chronic kidney disease) stage 2, GFR 60-89 ml/min N18.2 585.2 AMB POC BASIC METABOLIC PANEL 4. Encounter for immunization Z23 V03.89 INFLUENZA VACCINE INACTIVATED (IIV), SUBUNIT, ADJUVANTED, IM Plan: 1. Continue present meds 2. Discussed lifestyle modifications including Na restriction, low carb/fat diet, weight reduction and exercise (at least a walking program). Follow-up Disposition: 
Return in about 3 months (around 1/2/2019) for CPE. Orders Placed This Encounter  Influenza Vaccine Inactivated (IIV) (FLUAD), Subunit, Adjuvanted, IM (52264)  AMB POC BASIC METABOLIC PANEL  
 AMB POC HEMOGLOBIN A1C Kath Walden MD

## 2018-10-02 NOTE — PROGRESS NOTES
Patient states  has and ACP and will bring in a copy. Reviewed record in preparation for visit and have obtained necessary documentation. Identified pt with two pt identifiers(name and ). Chief Complaint Patient presents with [address] [place] [address] Annual Wellness Visit Coordination of Care Questionnaire: 
:  
 
1) Have you been to an emergency room, urgent care clinic since your last visit? No  
Hospitalized since your last visit? No  
2) Have you seen or consulted any other health care providers outside of [address] [place] [address] since your last visit?  No

## 2018-10-03 LAB — HBA1C MFR BLD HPLC: 5.7 % (ref 4.5–5.7)

## 2018-10-08 RX ORDER — OMEGA-3-ACID ETHYL ESTERS 1 G/1
CAPSULE, LIQUID FILLED ORAL
Qty: 360 CAP | Refills: 3 | Status: SHIPPED | OUTPATIENT
Start: 2018-10-08 | End: 2019-01-08

## 2018-12-24 DIAGNOSIS — I10 ESSENTIAL HYPERTENSION, BENIGN: ICD-10-CM

## 2018-12-24 RX ORDER — AMLODIPINE BESYLATE 5 MG/1
TABLET ORAL
Qty: 90 TAB | Refills: 3 | Status: SHIPPED | OUTPATIENT
Start: 2018-12-24 | End: 2019-12-19 | Stop reason: SDUPTHER

## 2018-12-31 RX ORDER — ATORVASTATIN CALCIUM 40 MG/1
TABLET, FILM COATED ORAL
Qty: 90 TAB | Refills: 3 | Status: SHIPPED | OUTPATIENT
Start: 2018-12-31 | End: 2019-10-09 | Stop reason: SDUPTHER

## 2019-01-08 ENCOUNTER — OFFICE VISIT (OUTPATIENT)
Dept: INTERNAL MEDICINE CLINIC | Age: 84
End: 2019-01-08

## 2019-01-08 VITALS
HEIGHT: 73 IN | DIASTOLIC BLOOD PRESSURE: 54 MMHG | TEMPERATURE: 97.9 F | HEART RATE: 57 BPM | RESPIRATION RATE: 14 BRPM | WEIGHT: 212.8 LBS | SYSTOLIC BLOOD PRESSURE: 132 MMHG | BODY MASS INDEX: 28.2 KG/M2 | OXYGEN SATURATION: 94 %

## 2019-01-08 DIAGNOSIS — D12.6 ADENOMATOUS POLYP OF COLON, UNSPECIFIED PART OF COLON: ICD-10-CM

## 2019-01-08 DIAGNOSIS — Z86.718 HISTORY OF DVT (DEEP VEIN THROMBOSIS): ICD-10-CM

## 2019-01-08 DIAGNOSIS — I87.2 VENOUS INSUFFICIENCY OF BOTH LOWER EXTREMITIES: ICD-10-CM

## 2019-01-08 DIAGNOSIS — Z91.81 RISK FOR FALLS: ICD-10-CM

## 2019-01-08 DIAGNOSIS — Z23 IMMUNIZATION DUE: ICD-10-CM

## 2019-01-08 DIAGNOSIS — I38 HEART VALVE DISORDER: ICD-10-CM

## 2019-01-08 DIAGNOSIS — N18.2 CKD (CHRONIC KIDNEY DISEASE) STAGE 2, GFR 60-89 ML/MIN: ICD-10-CM

## 2019-01-08 DIAGNOSIS — Z12.5 SCREENING FOR PROSTATE CANCER: ICD-10-CM

## 2019-01-08 DIAGNOSIS — G89.29 CHRONIC MIDLINE LOW BACK PAIN WITH SCIATICA, SCIATICA LATERALITY UNSPECIFIED: ICD-10-CM

## 2019-01-08 DIAGNOSIS — K42.9 UMBILICAL HERNIA WITHOUT OBSTRUCTION AND WITHOUT GANGRENE: ICD-10-CM

## 2019-01-08 DIAGNOSIS — D69.6 THROMBOCYTOPENIA (HCC): ICD-10-CM

## 2019-01-08 DIAGNOSIS — G89.29 CHRONIC RIGHT SHOULDER PAIN: ICD-10-CM

## 2019-01-08 DIAGNOSIS — I10 ESSENTIAL HYPERTENSION, BENIGN: ICD-10-CM

## 2019-01-08 DIAGNOSIS — K21.00 GASTROESOPHAGEAL REFLUX DISEASE WITH ESOPHAGITIS: ICD-10-CM

## 2019-01-08 DIAGNOSIS — R39.14 BENIGN PROSTATIC HYPERPLASIA WITH INCOMPLETE BLADDER EMPTYING: ICD-10-CM

## 2019-01-08 DIAGNOSIS — J30.9 ALLERGIC RHINITIS, UNSPECIFIED SEASONALITY, UNSPECIFIED TRIGGER: ICD-10-CM

## 2019-01-08 DIAGNOSIS — E78.2 HYPERLIPIDEMIA, MIXED: ICD-10-CM

## 2019-01-08 DIAGNOSIS — R29.898 WEAKNESS OF BOTH LOWER EXTREMITIES: ICD-10-CM

## 2019-01-08 DIAGNOSIS — R80.9 CONTROLLED TYPE 2 DIABETES MELLITUS WITH MICROALBUMINURIA, WITHOUT LONG-TERM CURRENT USE OF INSULIN (HCC): Primary | ICD-10-CM

## 2019-01-08 DIAGNOSIS — E11.29 CONTROLLED TYPE 2 DIABETES MELLITUS WITH MICROALBUMINURIA, WITHOUT LONG-TERM CURRENT USE OF INSULIN (HCC): Primary | ICD-10-CM

## 2019-01-08 DIAGNOSIS — M17.9 OSTEOARTHRITIS OF KNEE, UNSPECIFIED LATERALITY, UNSPECIFIED OSTEOARTHRITIS TYPE: ICD-10-CM

## 2019-01-08 DIAGNOSIS — G56.22 TARDY PALSY OF LEFT ULNAR NERVE: ICD-10-CM

## 2019-01-08 DIAGNOSIS — E66.09 CLASS 1 OBESITY DUE TO EXCESS CALORIES WITH SERIOUS COMORBIDITY AND BODY MASS INDEX (BMI) OF 30.0 TO 30.9 IN ADULT: ICD-10-CM

## 2019-01-08 DIAGNOSIS — K76.0 FATTY LIVER: ICD-10-CM

## 2019-01-08 DIAGNOSIS — I83.018 VENOUS STASIS ULCER OF OTHER PART OF RIGHT LOWER LEG WITH VARICOSE VEINS, UNSPECIFIED ULCER STAGE (HCC): ICD-10-CM

## 2019-01-08 DIAGNOSIS — N32.81 OAB (OVERACTIVE BLADDER): ICD-10-CM

## 2019-01-08 DIAGNOSIS — R15.1 FECAL SOILING: ICD-10-CM

## 2019-01-08 DIAGNOSIS — M48.062 SPINAL STENOSIS OF LUMBAR REGION WITH NEUROGENIC CLAUDICATION: ICD-10-CM

## 2019-01-08 DIAGNOSIS — J43.9 PULMONARY EMPHYSEMA, UNSPECIFIED EMPHYSEMA TYPE (HCC): ICD-10-CM

## 2019-01-08 DIAGNOSIS — E55.9 VITAMIN D DEFICIENCY: ICD-10-CM

## 2019-01-08 DIAGNOSIS — R29.6 FREQUENT FALLS: ICD-10-CM

## 2019-01-08 DIAGNOSIS — N39.0 URINARY TRACT INFECTION WITH HEMATURIA, SITE UNSPECIFIED: ICD-10-CM

## 2019-01-08 DIAGNOSIS — S32.020S CLOSED COMPRESSION FRACTURE OF SECOND LUMBAR VERTEBRA, SEQUELA: ICD-10-CM

## 2019-01-08 DIAGNOSIS — H91.93 BILATERAL HEARING LOSS, UNSPECIFIED HEARING LOSS TYPE: ICD-10-CM

## 2019-01-08 DIAGNOSIS — K44.9 HIATAL HERNIA: ICD-10-CM

## 2019-01-08 DIAGNOSIS — R80.9 PROTEINURIA, UNSPECIFIED TYPE: ICD-10-CM

## 2019-01-08 DIAGNOSIS — M54.40 CHRONIC MIDLINE LOW BACK PAIN WITH SCIATICA, SCIATICA LATERALITY UNSPECIFIED: ICD-10-CM

## 2019-01-08 DIAGNOSIS — R31.9 URINARY TRACT INFECTION WITH HEMATURIA, SITE UNSPECIFIED: ICD-10-CM

## 2019-01-08 DIAGNOSIS — I45.10 INCOMPLETE RIGHT BUNDLE BRANCH BLOCK: ICD-10-CM

## 2019-01-08 DIAGNOSIS — M25.511 CHRONIC RIGHT SHOULDER PAIN: ICD-10-CM

## 2019-01-08 DIAGNOSIS — N52.9 ERECTILE DYSFUNCTION, UNSPECIFIED ERECTILE DYSFUNCTION TYPE: ICD-10-CM

## 2019-01-08 DIAGNOSIS — R55 SYNCOPE, UNSPECIFIED SYNCOPE TYPE: ICD-10-CM

## 2019-01-08 DIAGNOSIS — L97.819 VENOUS STASIS ULCER OF OTHER PART OF RIGHT LOWER LEG WITH VARICOSE VEINS, UNSPECIFIED ULCER STAGE (HCC): ICD-10-CM

## 2019-01-08 DIAGNOSIS — M65.332 TRIGGER MIDDLE FINGER OF LEFT HAND: ICD-10-CM

## 2019-01-08 DIAGNOSIS — R13.12 OROPHARYNGEAL DYSPHAGIA: ICD-10-CM

## 2019-01-08 DIAGNOSIS — N40.1 BENIGN PROSTATIC HYPERPLASIA WITH INCOMPLETE BLADDER EMPTYING: ICD-10-CM

## 2019-01-08 DIAGNOSIS — Z79.899 LONG-TERM CURRENT USE OF HIGH RISK MEDICATION OTHER THAN ANTICOAGULANT: ICD-10-CM

## 2019-01-08 DIAGNOSIS — G89.29 CHRONIC LOW BACK PAIN WITH SCIATICA, SCIATICA LATERALITY UNSPECIFIED, UNSPECIFIED BACK PAIN LATERALITY: ICD-10-CM

## 2019-01-08 DIAGNOSIS — K40.90 INGUINAL HERNIA OF RIGHT SIDE WITHOUT OBSTRUCTION OR GANGRENE: ICD-10-CM

## 2019-01-08 DIAGNOSIS — I67.9 CVD (CEREBROVASCULAR DISEASE): ICD-10-CM

## 2019-01-08 DIAGNOSIS — M54.40 CHRONIC LOW BACK PAIN WITH SCIATICA, SCIATICA LATERALITY UNSPECIFIED, UNSPECIFIED BACK PAIN LATERALITY: ICD-10-CM

## 2019-01-08 LAB
ALBUMIN SERPL-MCNC: 4.2 G/DL (ref 3.9–5.4)
ALKALINE PHOS POC: 86 U/L (ref 38–126)
ALT SERPL-CCNC: 29 U/L (ref 9–52)
AST SERPL-CCNC: 25 U/L (ref 14–36)
BACTERIA UA POCT, BACTPOCT: ABNORMAL
BILIRUB UR QL STRIP: NEGATIVE
BUN BLD-MCNC: 46 MG/DL (ref 9–20)
CALCIUM BLD-MCNC: 10.3 MG/DL (ref 8.4–10.2)
CASTS UA POCT: ABNORMAL
CHLORIDE BLD-SCNC: 108 MMOL/L (ref 98–107)
CHOLEST SERPL-MCNC: 111 MG/DL (ref 0–200)
CK (CPK) POC: 111 U/L (ref 30–135)
CLUE CELLS, CLUEPOCT: NEGATIVE
CO2 POC: 28 MMOL/L (ref 22–32)
CREAT BLD-MCNC: 0.6 MG/DL (ref 0.8–1.5)
CRYSTALS UA POCT, CRYSPOCT: NEGATIVE
EGFR (POC): 94.3
EPITHELIAL CELLS POCT: ABNORMAL
GLUCOSE POC: 140 MG/DL (ref 75–110)
GLUCOSE UR-MCNC: NEGATIVE MG/DL
GRAN# POC: 5.1 K/UL (ref 2–7.8)
GRAN% POC: 74.4 % (ref 37–92)
HBA1C MFR BLD HPLC: 6.5 % (ref 4.5–5.7)
HCT VFR BLD CALC: 35.5 % (ref 37–51)
HDLC SERPL-MCNC: 32 MG/DL (ref 35–130)
HGB BLD-MCNC: 11.8 G/DL (ref 12–18)
IRON POC: 43 UG/DL (ref 49–181)
IRON SATURATION POC: 10 % (ref 15–55)
KETONES P FAST UR STRIP-MCNC: NEGATIVE MG/DL
LDL CHOLESTEROL POC: 54.4 MG/DL (ref 0–130)
LY# POC: 1.4 K/UL (ref 0.6–4.1)
LY% POC: 21.5 % (ref 10–58.5)
MCH RBC QN: 29.6 PG (ref 26–32)
MCHC RBC-ENTMCNC: 33.1 G/DL (ref 30–36)
MCV RBC: 90 FL (ref 80–97)
MICROALBUMIN UR TEST STR-MCNC: 20 MG/L (ref 0–20)
MID #, POC: 0.2 K/UL (ref 0–1.8)
MID% POC: 4.1 % (ref 0.1–24)
MUCUS UA POCT, MUCPOCT: ABNORMAL
PH UR STRIP: 5 [PH] (ref 5–7)
PLATELET # BLD: 157 K/UL (ref 140–440)
POTASSIUM SERPL-SCNC: 5.2 MMOL/L (ref 3.6–5)
PROT SERPL-MCNC: 7.6 G/DL (ref 6.3–8.2)
PROT UR QL STRIP: ABNORMAL
RBC # BLD: 3.97 M/UL (ref 4.2–6.3)
RBC UA POCT, RBCPOCT: ABNORMAL
SODIUM SERPL-SCNC: 149 MMOL/L (ref 137–145)
SP GR UR STRIP: 1.02 (ref 1.01–1.02)
SPIROMETRY INTERPRETATION, SPITPOCT: NORMAL
TCHOL/HDL RATIO (POC): 3.5 (ref 0–4)
TIBC POC: 408 UG/DL (ref 260–462)
TOTAL BILIRUBIN POC: 0.8 MG/DL (ref 0.2–1.3)
TRICH UA POCT, TRICHPOC: NEGATIVE
TRIGL SERPL-MCNC: 123 MG/DL (ref 0–200)
UA UROBILINOGEN AMB POC: NORMAL (ref 0.2–1)
URINALYSIS CLARITY POC: ABNORMAL
URINALYSIS COLOR POC: YELLOW
URINE BLOOD POC: ABNORMAL
URINE CULT COMMENT, POCT: ABNORMAL
URINE LEUKOCYTES POC: ABNORMAL
URINE NITRITES POC: NEGATIVE
VLDLC SERPL CALC-MCNC: 24.6 MG/DL
WBC # BLD: 6.7 K/UL (ref 4.1–10.9)
WBC UA POCT, WBCPOCT: ABNORMAL
YEAST UA POCT, YEASTPOC: NEGATIVE

## 2019-01-08 RX ORDER — OMEGA-3-ACID ETHYL ESTERS 1 G/1
2 CAPSULE, LIQUID FILLED ORAL 2 TIMES DAILY
COMMUNITY
End: 2019-10-09 | Stop reason: SDUPTHER

## 2019-01-08 RX ORDER — MOMETASONE FUROATE 1 MG/G
CREAM TOPICAL
Qty: 180 G | Refills: 3 | Status: SHIPPED | OUTPATIENT
Start: 2019-01-08 | End: 2020-01-13 | Stop reason: SDUPTHER

## 2019-01-08 NOTE — PROGRESS NOTES
Mr. Raquel Eden is an 82year old  male who comes to the office today for annual comprehensive personal healthcare examination. 
  
History of Present Illness: This patient has multiple medical problems. These include: 1. Hypertension. 2. Hyperlipidemia. 3. Diabetes type 2, controlled, with microalbuminuria. 4. Benign prostatic hypertrophy. 5. Bilateral carotid artery stenosis, status post left carotid endarterectomy with residual 50-79% stenosis on the right. He last underwent carotid duplex in October, 2018 with the 50-79% stenosis on that side being slightly worse. 6. History of recurrent cellulitis of the lower extremities associated with venous insufficiency. He still has issues with stasis dermatitis, although has had no recent stasis ulcers and very stable chronic edema and no recent cellulitis. 7. Chronic back pain subsequent to an L2 compression fracture in 1985. 
8. Lumbar spinal stenosis with bilateral foot drop, status post laminectomy at L4-5 with bilateral decompressions in October, 2015. He continues to have intermittent problems with moderate back pain and lower extremity weakness. He has been better recently with no recent falls since undergoing physical therapy, etc. 
9. CKD stage 2. 
10. History of colon polyps. 11. Minimal COPD. 12. History of thrombocytopenia in the past. 
13. History of frequent falls. He uses a cane or walker to prevent falls. We also talked about using his AFOs for his foot drop. 14. History of syncope without recurrence. 15. Allergic rhinitis. 16. Degenerative joint disease, status post bilateral TKRs. 17. History of GERD and dysphagia, corrected with endoscopy and dilatation. He has had no recent issues with strangling on liquids related to esophageal dyskinesia that he has had in the past.  He does have a hiatal hernia. 18. Lower extremity venous insufficiency as noted. 19. Recurrent edema and cellulitis and stasis dermatitis as noted. 20. Erectile dysfunction. 21. Fatty liver. 22. History of deep vein thrombosis in the remote past, none recently. 23. Mild mitral regurgitation and aortic insufficiency. 24. Decreased auditory acuity with hearing aids. 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 26. Proteinuria associated with chronic kidney disease, not all related to microalbuminuria. 27. 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 28. Overactive bladder. 29. Remote history of peptic ulcer disease. 30. Body mass index greater than 28, consistent with an overweight state. 31. Vitamin D deficiency. 32. Chronic right shoulder pain. 33. Umbilical hernia, easily reducible. 34. History of fecal soiling, which is improved. 
  
At the time of the visit he was doing well. He was still having some issues with lower extremity weakness, but as long as he uses his walker he was not having the frequent falls. He was trying to exercise on a regular basis. He denied new cardiorespiratory, GI or  symptoms. 
  
Past Medical History:  Otherwise remarkable for bilateral cataract extractions, bilateral TKRs, right eye vitrectomy and left carotid endarterectomy, as well as the back surgeries. He has also had a right inguinal repair with mesh. 
  
Allergies:  Niaspan causes a rash. Other allergies include Vesicare, penicillin and OxyContin. 
  
Current Medications: 1. Aspirin 325 mg daily. 2. Atorvastatin 40 mg daily. 3. Flonase, two sprays a day. 4. Mometasone cream twice a day for stasis dermatitis on his legs. 5. Furosemide 80 mg, 1 1/2 tablets daily. 6. Levitra as needed. 7. Lisinopril 40 mg daily. 8. Omega 3, 1 gram, two twice a day, (generic Lovaza). 9. Clonidine 0.1 mg b.i.d. 10. Amlodipine 5 mg daily. 11. Metformin 500 mg, two twice a day. 12. Atenolol 50 mg daily. 13. Multivitamin daily. 14. Vitamin D 1,000 IU twice a day. 15. Nystatin Triamcinolone cream, applied to his feet twice a day. 16. Miralax as needed. 17. Lotrisone cream perianally as needed. 18. [facility] Easy Fiber, two tablespoons daily. 19. Aleve as needed. 
  
Social History:  He is . He is retired. He uses alcohol moderately. He is a former smoker, but quit in . 
  
Family History:  Brother is  and he had hypertension, hyperlipidemia and CAD. Father unknown to him. Mother is  with a history of hypertension. Two sisters have hypertension and high cholesterol and one is . 
  
Review of Systems: 
CONSTITUTIONAL:  Denies fevers, weight loss, sweats, or fatigue. HEENT:  No blurred or double vision, headaches or dizziness. No difficulty with swallowing, taste, speech or smell. RESPIRATORY:  No cough, wheezing. Some occasional shortness of breath with exertion. No sputum production. CARDIAC:  Denies chest pain, palpitations, unexplained indigestion, syncope, edema, PND or orthopnea. GI:  No changes in bowel habits, abdominal pain, no bloating, anorexia, nausea, vomiting or heartburn. :  Denies frequency, nocturia, stranguria, dysuria or sexual dysfunction. EXTREMITIES:  Lower extremity weakness and swelling. Some right shoulder pain and left elbow pain, difficulties with bilateral knee pain. He also notes the stasis dermatitis and stasis ulcers at times in his lower extremities. SKIN:  No mole changes. Stasis dermatitis involving his lower extremities. MSK:  Back, hip and buttock pain at times. NEUROLOGICAL:  No syncope, dizziness, frequent headaches or memory loss. Foot drop bilaterally with some numbness in his feet. Physical Examination: 
GENERAL:  Well-developed, well-nourished, no acute distress. VITAL SIGNS:  BP: 132/54. P: 57 and regular. R: 14.  T: 97.9. HT: 6'1\". WT: 212 lbs. BMI: 28.1. VISION:  Deferred to ophthalmologist.   
HEARING:  Deferred as he uses hearing aids. HEENT:  Ears:  The TMs and ear canals were clear. Eyes:  The pupillary responses were normal.  Extraocular muscle function intact. Lids and conjunctiva not injected. Funduscopic exam revealed sharp disc margins. Pharynx:  Partial plates. No masses were noted. NECK:  Supple without thyromegaly or adenopathy. No JVD noted. Well healed left carotid endarterectomy, right carotid bruit. LUNGS: Clear to auscultation and percussion. CARDIAC:  Regular rate and rhythm. Grade 2/6 systolic murmur. PMI not displaced. No gallop, rub or click. ABDOMEN:  Mildly obese, umbilical hernia easily reducible. Soft, non-tender without palpable organomegaly or mass. No pulsatile mass was felt, and no bruit was heard. Bowel sounds were active. :  Circumcised male. Both testes descended and no masses felt. Well healed right inguinal hernia incision. RECTAL EXAM: Deferred. EXTREMITIES:  No clubbing, cyanosis. 1-2+ edema with stasis changes of both lower extremities below the knees. Well healed incisions over both knees. Pulses intact. Sensation is diminished to monofilament testing. No callus formation or ulcerations noted on his feet. SKIN:  No unusual rash or mole changes noted. LYMPH NODES:  None felt in the cervical, supraclavicular, axillary or inguinal region. NEUROLOGICAL:  Mild generalized lower extremity weakness and bilateral foot drops. Ankle reflexes are absent. Cranial nerves II-XII grossly intact. Diabetic foot exam:  
 
Left Foot: 
 Visual Exam: normal  
 Pulse DP: 2+ (normal) Filament test: normal sensation Vibratory sensation: normal 
   
Right Foot: 
 Visual Exam: normal  
 Pulse DP: 2+ (normal) Filament test: normal sensation Vibratory sensation: normal 
 
Laboratory:  Hemoglobin is 11.8. White blood count is 6,700. Blood sugar is 140. Liver and kidney function are normal.  Electrolytes are normal.  Iron is 43. PSA is 3.8.  TSH is 2.06.   Urinalysis is infected and needs Rx with Cipro. Urine for microalbumin is 20. A1c is 6.5. Vitamin D level is 41.6. Lipid profile reveals a total cholesterol of 111, triglycerides of 123, HDL cholesterol of 32 and an LDL cholesterol of 54. 4. 
  
Health Maintenance Issues and Ancillary Studies: 1. TDAP (pertussis and tetanus) vaccine was given in October, 2012. 2. Pneumovax 23 (PPSV23) was given in January, 2012. 3. Seasonal influenza vaccine was given in October, 2018. 4. Zostavax was given in October, 2010. 5. Prevnar 13 (PCV13) was given in January, 2015. 6. Prescription for Shingrix given on the day of the visit. 7. Hepatitis A vaccine given in September, 2007. 8. Protein creatinine ratio in the past has revealed > 4 grams protein 9. Venous doppler of the lower extremities negative for DVT in October, 2012. 10. EGD revealed some mild gastritis and duodenitis, as well as esophageal dyskinesia in July, 2017. He was empirically dilated. 11. Carotid dopplers revealed 50-79% stenosis on the right in October, 2018. The left side had a clean endarterectomy site. Follow up study is scheduled for April. 12. Echocardiogram in March, 2015 revealed ejection fraction of 60%, which is normal. He had mild AI and mild MR, which are probably not hemodynamically significant. 13. Stress testing was negative in January, 2013 except for poor exercise tolerance. 14. MRI of brain in March, 2015 was negative. 15. MRI of the cervical spine in March, 2015 revealed multilevel degenerative disc disease, especially at C6-7, where there was some stenosis. 16. Cardiac calcium score was 1,092 in January, 2013. 17. Diabetic eye examination in September, 2018 revealed no diabetic retinopathy. 18. EEG was negative in March, 2015. 19. Colonoscopy in December, 2014 was negative. (Follow up as needed.) 20. MRI of the LS spine in October, 2016 revealed multilevel degenerative disc disease.  
21. EKG in the office essentially normal. 
 22. Health screening assessments were normal otherwise. 
  
Impression: 1. Hypertension 2. Hyperlipidemia. 3. Cerebrovascular disease, status post left CEA with residual right carotid stenosis. 4. Mild valvular heart disease with AI and minimal MR. 
5. History of thrombocytopenia. 6. BPH. 7. Allergic rhinitis. 8. Frequent falls, improved with exercise and use of a walker. 9. Venous insufficiency with previous history of cellulitis and stasis dermatitis and stasis ulcers. 10. Chronic back pain with history of L2 compression fracture and lumbar spondylosis/stenosis, status post laminectomy with residual lower extremity weakness. 11. CKD-2. 
12. History of colon polyps. 13. Minimal coronary artery disease. 14. History of fatty liver. 15. Degenerative joint disease, status post bilateral total knee replacements. 16. Diabetes type 2, adequately controlled. 17. GERD with previous history of dysphagia. 18. History of gastritis/duodenitis. 19. ED. 20. History of DVT. 21. Bilateral hearing loss. 22. History of hiatal hernia. 23. Overactive bladder. 24. Proteinuria. 25. Remote history of peptic ulcer disease. 26. Chronic right shoulder pain. 27. Umbilical hernia, easily reducible. 28. Vitamin D deficiency. 29. Allergic rhinitis. 30. Left tardy ulnar palsy. 31. Status post surgeries as noted. 
  
Plan: 1. Continue present medications. 2. Improve exercise program. 
3. Recheck here three months' time. 4. Use AFOs. 5. He appears appropriately treated for his ten year coronary heart disease risk. 6. His diabetes is under good control. 7. Needs Rx of UTI with Cipro 500 mg twice a day x 10 days All screenings were reviewed and results discussed with the patient, who verbalized understand and agreement with the plans. A copy of the after visit summary with a personalized health plan was provided to the patient on the day of the visit. This is a Subsequent Medicare Annual Wellness Exam (AWV) (Performed 12 months after IPPE or effective date of Medicare Part B enrollment) I have reviewed the patient's medical history in detail and updated the computerized patient record as noted above. Problem list reviewed with patient and risk factors discussed. PSH, SH, FH, Medications and HM issues also reviewed and discussed. Depression screen, fall risk assessment, functional abilities and ACP also reviewed and discussed as above and below. Depression Risk Factor Screening: PHQ over the last two weeks 1/8/2019 PHQ Not Done - Little interest or pleasure in doing things Not at all Feeling down, depressed, irritable, or hopeless Not at all Total Score PHQ 2 0 Alcohol Risk Factor Screening: You do not drink alcohol or very rarely. Functional Ability and Level of Safety:  
Hearing Loss The patient wears hearing aids. Activities of Daily Living The home contains: handrails, grab bars and walker Patient needs help with:  shopping, laundry, housework, bathing and hygiene Fall Risk Fall Risk Assessment, last 12 mths 1/8/2019 Able to walk? Yes Fall in past 12 months? No  
Fall with injury? -  
Number of falls in past 12 months - Fall Risk Score -  
 
 
Abuse Screen Patient is not abused Cognitive Screening Evaluation of Cognitive Function: 
Has your family/caregiver stated any concerns about your memory: no 
Normal 
 
Patient Care Team  
Patient Care Team: 
Briana Smith MD as PCP - General (Internal Medicine) Celestina Leslie MD as Surgeon (General Surgery) Kalyan Espinal NP (Nurse Practitioner) Assessment/Plan Education and counseling provided: 
Are appropriate based on today's review and evaluation Diagnoses and all orders for this visit: 1. Controlled type 2 diabetes mellitus with microalbuminuria, without long-term current use of insulin ([facility] [address] -     AMB POC COMPREHENSIVE METABOLIC PANEL 
-     AMB POC IRON BINDING CAPACITY (FE/TIBC) -     AMB POC TSH 
-     AMB POC URINE, MICROALBUMIN, SEMIQUANT (1 RESULT) -     AMB POC HEMOGLOBIN A1C 
-     HM DIABETES FOOT EXAM 
 
2. Essential hypertension, benign -     AMB POC EKG ROUTINE W/ 12 LEADS, INTER & REP 
-     AMB POC COMPLETE CBC,AUTOMATED ENTER 
-     AMB POC COMPREHENSIVE METABOLIC PANEL 
-     AMB POC URINALYSIS DIP STICK AUTO W/ MICRO 3. Hyperlipidemia, mixed -     AMB POC LIPID PROFILE 4. Frequent falls 5. Weakness of both lower extremities 6. Spinal stenosis of lumbar region with neurogenic claudication 7. Chronic low back pain with sciatica, sciatica laterality unspecified, unspecified back pain laterality 8. Proteinuria, unspecified type 9. Hiatal hernia 10. Oropharyngeal dysphagia 11. Risk for falls 12. Heart valve disorder 13. CVD (cerebrovascular disease) 14. Pulmonary emphysema, unspecified emphysema type ([facility] [address] -     AMB POC SPIROMETRY REVIEW/INTERP 15. CKD (chronic kidney disease) stage 2, GFR 60-89 ml/min -     AMB POC COMPLETE CBC,AUTOMATED ENTER 
-     AMB POC IRON BINDING CAPACITY (FE/TIBC) 16. Venous stasis ulcer of other part of right lower leg with varicose veins, unspecified ulcer stage ([address] 17. Fecal soiling 18. Venous insufficiency of both lower extremities 19. Incomplete right bundle branch block 20. Chronic midline low back pain with sciatica, sciatica laterality unspecified 21. Benign prostatic hyperplasia with incomplete bladder emptying 22. Fatty liver 23. Gastroesophageal reflux disease with esophagitis 24. Thrombocytopenia ([address] 25. Long-term current use of high risk medication other than anticoagulant -     AMB POC CK (CPK) 26. Osteoarthritis of knee, unspecified laterality, unspecified osteoarthritis type 27. Syncope, unspecified syncope type 28. Adenomatous polyp of colon, unspecified part of colon 29. Closed compression fracture of second lumbar vertebra, sequela 30. Chronic right shoulder pain 31. Trigger middle finger of left hand 32. Allergic rhinitis, unspecified seasonality, unspecified trigger 33. Tardy palsy of left ulnar nerve 34. OAB (overactive bladder) 35. Class 1 obesity due to excess calories with serious comorbidity and body mass index (BMI) of 30.0 to 30.9 in adult 36. Umbilical hernia without obstruction and without gangrene 37. Erectile dysfunction, unspecified erectile dysfunction type 38. Bilateral hearing loss, unspecified hearing loss type 39. History of DVT (deep vein thrombosis) 40. Vitamin D deficiency -     AMB POC VITAMIN D 
 
41. Inguinal hernia of right side without obstruction or gangrene 42. Screening for prostate cancer -     AMB POC PSA  (MEDICARE) 43. Immunization due 
-     varicella-zoster recombinant, PF, (SHINGRIX, PF,) 50 mcg/0.5 mL susr injection; 0.5 mL by IntraMUSCular route once for 1 dose. Other orders 
-     mometasone (ELOCON) 0.1 % topical cream; APPLY 1 APPLICATION TWICE A DAY Other problems as listed above. Health Maintenance Due Topic Date Due  Shingrix Vaccine Age 50> (1 of 2) 01/25/1985 Orders Placed This Encounter  AMB POC SPIROMETRY REVIEW/INTERP  
 AMB POC COMPLETE CBC,AUTOMATED ENTER  AMB POC COMPREHENSIVE METABOLIC PANEL  
 AMB POC IRON BINDING CAPACITY (FE/TIBC)  AMB POC LIPID PROFILE  
 AMB POC TSH  AMB POC URINALYSIS DIP STICK AUTO W/ MICRO  AMB POC VITAMIN D  
 AMB POC URINE, MICROALBUMIN, SEMIQUANT (1 RESULT)  AMB POC HEMOGLOBIN A1C  
 AMB POC CK (CPK)  AMB POC PSA  (MEDICARE)  AMB POC EKG ROUTINE W/ 12 LEADS, INTER & REP Order Specific Question:   Reason for Exam:   Answer:   comlete physical  
 HM DIABETES FOOT EXAM  
 mometasone (ELOCON) 0.1 % topical cream  
 Sig: APPLY 1 APPLICATION TWICE A DAY Dispense:  180 g Refill:  3  
 varicella-zoster recombinant, PF, (SHINGRIX, PF,) 50 mcg/0.5 mL susr injection Si.5 mL by IntraMUSCular route once for 1 dose. Dispense:  0.5 mL Refill:  1 Latrell Tello MD

## 2019-01-08 NOTE — PROGRESS NOTES
Chief Complaint Patient presents with [facility] Annual Wellness Visit Depression Risk Factor Screening: PHQ over the last two weeks 1/8/2019 PHQ Not Done - Little interest or pleasure in doing things Not at all Feeling down, depressed, irritable, or hopeless Not at all Total Score PHQ 2 0 Functional Ability and Level of Safety: Activities of Daily Living ADL Assessment 1/8/2019 Feeding yourself No Help Needed Getting from bed to chair No Help Needed Getting dressed No Help Needed Bathing or showering No Help Needed Walk across the room (includes cane/walker) No Help Needed Using the telphone No Help Needed Taking your medications No Help Needed Preparing meals No Help Needed Managing money (expenses/bills) No Help Needed Moderately strenuous housework (laundry) No Help Needed Shopping for personal items (toiletries/medicines) No Help Needed Shopping for groceries No Help Needed Driving No Help Needed Climbing a flight of stairs No Help Needed Getting to places beyond walking distances Help Needed Fall Risk Fall Risk Assessment, last 12 mths 1/8/2019 Able to walk? Yes Fall in past 12 months? No  
Fall with injury? -  
Number of falls in past 12 months - Fall Risk Score -  
 
 
Abuse Screen Abuse Screening Questionnaire 1/8/2019 Do you ever feel afraid of your partner? Gurdeep Bile Are you in a relationship with someone who physically or mentally threatens you? Gurdeep Bile Is it safe for you to go home? Lauren Davidson Patient Care Team  
Patient Care Team: 
Kelby Moise MD as PCP - General (Internal Medicine) Irais Gilmore MD as Surgeon (General Surgery) Melissa Campbell NP (Nurse Practitioner)

## 2019-01-09 LAB
PSA SERPL-MCNC: 3.8 NG/ML (ref 0–4)
TSH BLD-ACNC: 2.06 UIU/ML (ref 0.4–4.2)
VITAMIN D POC: 41.6 NG/ML (ref 30–96)

## 2019-01-10 LAB
BACTERIA UR CULT: ABNORMAL
BACTERIA UR CULT: ABNORMAL

## 2019-01-10 NOTE — PROGRESS NOTES
Hgb and WBC normal.  . Liver and kidney function normal. Lipids excellent with LDL 54. Fe normal.  A1C good 6.5. PSA adequate 3.8. Thyroid normal.  Vitamin D normal..  Urine infected. Rx Cipro 500 mg bid x 10 days. Needs a fu urine culture after finishing abx.

## 2019-01-11 ENCOUNTER — TELEPHONE (OUTPATIENT)
Dept: INTERNAL MEDICINE CLINIC | Age: 84
End: 2019-01-11

## 2019-01-11 RX ORDER — CIPROFLOXACIN 500 MG/1
500 TABLET ORAL 2 TIMES DAILY
Qty: 20 TAB | Refills: 0 | Status: SHIPPED | OUTPATIENT
Start: 2019-01-11 | End: 2019-01-21

## 2019-01-11 NOTE — TELEPHONE ENCOUNTER
Patient informed that Dr. Fani Spence reviewed lab results and stated Hgb and WBC normal.  . Liver and kidney function normal. Lipids excellent with LDL 54. Fe normal.  A1C good 6.5. PSA adequate 3.8. Thyroid normal.  Vitamin D normal..  Urine infected. Rx Cipro 500 mg bid x 10 days. Needs a fu urine culture after finishing abx. Patient is to return to office on 01- for follow up U/A and urine culture. Per verbal order from Dr. Fani Spence, sent in Cipro 500mg Take 1 tab twice a day for 10 days, #20, 0 Refill to  [facility]. Requested Prescriptions     Pending Prescriptions Disp Refills    ciprofloxacin HCl (CIPRO) 500 mg tablet 20 Tab 0     Sig: Take 1 Tab by mouth two (2) times a day for 10 days.

## 2019-01-11 NOTE — TELEPHONE ENCOUNTER
----- Message from Emmett Montes MD sent at 1/10/2019  5:42 PM EST -----  Hgb and WBC normal.  . Liver and kidney function normal. Lipids excellent with LDL 54. Fe normal.  A1C good 6.5. PSA adequate 3.8. Thyroid normal.  Vitamin D normal..  Urine infected. Rx Cipro 500 mg bid x 10 days. Needs a fu urine culture after finishing abx.

## 2019-01-20 RX ORDER — LISINOPRIL 40 MG/1
TABLET ORAL
Qty: 90 TAB | Refills: 3 | Status: SHIPPED | OUTPATIENT
Start: 2019-01-20 | End: 2019-11-05 | Stop reason: SDUPTHER

## 2019-01-20 RX ORDER — FUROSEMIDE 80 MG/1
TABLET ORAL
Qty: 145 TAB | Refills: 3 | Status: SHIPPED | OUTPATIENT
Start: 2019-01-20 | End: 2019-11-05 | Stop reason: SDUPTHER

## 2019-01-20 RX ORDER — BLOOD-GLUCOSE METER
KIT MISCELLANEOUS
Qty: 100 STRIP | Refills: 3 | Status: SHIPPED | OUTPATIENT
Start: 2019-01-20 | End: 2020-01-13 | Stop reason: SDUPTHER

## 2019-01-24 ENCOUNTER — LAB ONLY (OUTPATIENT)
Dept: INTERNAL MEDICINE CLINIC | Age: 84
End: 2019-01-24

## 2019-01-24 DIAGNOSIS — N39.0 URINARY TRACT INFECTION WITHOUT HEMATURIA, SITE UNSPECIFIED: Primary | ICD-10-CM

## 2019-01-24 LAB
BACTERIA UA POCT, BACTPOCT: NORMAL
BILIRUB UR QL STRIP: NEGATIVE
CASTS UA POCT: NEGATIVE
CLUE CELLS, CLUEPOCT: NEGATIVE
CRYSTALS UA POCT, CRYSPOCT: NEGATIVE
EPITHELIAL CELLS POCT: NEGATIVE
GLUCOSE UR-MCNC: NEGATIVE MG/DL
KETONES P FAST UR STRIP-MCNC: NEGATIVE MG/DL
MUCUS UA POCT, MUCPOCT: NORMAL
PH UR STRIP: 6 [PH] (ref 5–7)
PROT UR QL STRIP: NORMAL
RBC UA POCT, RBCPOCT: 0
SP GR UR STRIP: 1.02 (ref 1.01–1.02)
TRICH UA POCT, TRICHPOC: NEGATIVE
UA UROBILINOGEN AMB POC: NORMAL (ref 0.2–1)
URINALYSIS CLARITY POC: CLEAR
URINALYSIS COLOR POC: NORMAL
URINE BLOOD POC: NEGATIVE
URINE CULT COMMENT, POCT: NORMAL
URINE LEUKOCYTES POC: NEGATIVE
URINE NITRITES POC: NEGATIVE
WBC UA POCT, WBCPOCT: 0
YEAST UA POCT, YEASTPOC: NEGATIVE

## 2019-01-26 LAB — BACTERIA UR CULT: NORMAL

## 2019-01-31 ENCOUNTER — TELEPHONE (OUTPATIENT)
Dept: INTERNAL MEDICINE CLINIC | Age: 84
End: 2019-01-31

## 2019-01-31 NOTE — TELEPHONE ENCOUNTER
PHI verified. Patient informed that Dr. Alfonzo Hall has reviewed lab results and stated Urine culture negative. Patient states he has completed the antibiotic, does not ever want to take it again. He stated it made his blood sugar drop and he is just now about to get back normal again. He also states that at the end of the antibiotic, he developed blisters on his feet, 1 large blister on top of his right foot and 3 - 4 blisters on his left foot/toes. He states the blisters are healing. I advised if the blisters did not continue to heal, to call the office and let us know. Dr. Alfonzo Hall notified.

## 2019-04-05 PROBLEM — D64.9 ANEMIA: Status: ACTIVE | Noted: 2019-04-05

## 2019-04-08 ENCOUNTER — OFFICE VISIT (OUTPATIENT)
Dept: INTERNAL MEDICINE CLINIC | Age: 84
End: 2019-04-08

## 2019-04-08 VITALS
HEART RATE: 60 BPM | HEIGHT: 73 IN | TEMPERATURE: 97.6 F | SYSTOLIC BLOOD PRESSURE: 132 MMHG | WEIGHT: 221.8 LBS | BODY MASS INDEX: 29.4 KG/M2 | OXYGEN SATURATION: 97 % | DIASTOLIC BLOOD PRESSURE: 64 MMHG

## 2019-04-08 DIAGNOSIS — R80.9 CONTROLLED TYPE 2 DIABETES MELLITUS WITH MICROALBUMINURIA, WITHOUT LONG-TERM CURRENT USE OF INSULIN (HCC): ICD-10-CM

## 2019-04-08 DIAGNOSIS — I10 ESSENTIAL HYPERTENSION, BENIGN: Primary | ICD-10-CM

## 2019-04-08 DIAGNOSIS — D64.9 ANEMIA, UNSPECIFIED TYPE: ICD-10-CM

## 2019-04-08 DIAGNOSIS — E11.29 CONTROLLED TYPE 2 DIABETES MELLITUS WITH MICROALBUMINURIA, WITHOUT LONG-TERM CURRENT USE OF INSULIN (HCC): ICD-10-CM

## 2019-04-08 DIAGNOSIS — D69.6 THROMBOCYTOPENIA (HCC): ICD-10-CM

## 2019-04-08 DIAGNOSIS — N18.2 CKD (CHRONIC KIDNEY DISEASE) STAGE 2, GFR 60-89 ML/MIN: ICD-10-CM

## 2019-04-08 DIAGNOSIS — I87.2 VENOUS INSUFFICIENCY OF BOTH LOWER EXTREMITIES: ICD-10-CM

## 2019-04-08 DIAGNOSIS — I89.0 LYMPHEDEMA: Primary | ICD-10-CM

## 2019-04-08 LAB
ANION GAP SERPL CALC-SCNC: 17 MMOL/L
BUN SERPL-MCNC: 61 MG/DL (ref 9–20)
CALCIUM SERPL-MCNC: 9.5 MG/DL (ref 8.4–10.2)
CHLORIDE SERPL-SCNC: 99 MMOL/L (ref 98–107)
CO2 SERPL-SCNC: 28 MMOL/L (ref 22–32)
CREAT SERPL-MCNC: 0.7 MG/DL (ref 0.8–1.5)
ERYTHROCYTE [DISTWIDTH] IN BLOOD BY AUTOMATED COUNT: 15.4 %
GLUCOSE SERPL-MCNC: 132 MG/DL (ref 75–110)
HCT VFR BLD AUTO: 36.9 % (ref 37–51)
HGB BLD-MCNC: 12.2 G/DL (ref 12–18)
LYMPHOCYTES ABSOLUTE: 1.7 K/UL (ref 0.6–4.1)
LYMPHOCYTES NFR BLD: 23.7 % (ref 10–58.5)
MCH RBC QN AUTO: 30 PG (ref 26–32)
MCHC RBC AUTO-ENTMCNC: 33.1 G/DL (ref 30–36)
MCV RBC AUTO: 90.6 FL (ref 80–97)
MONOCYTES ABS-DIF,2141: 0.7 K/UL (ref 0–1.8)
MONOCYTES NFR BLD: 9.8 % (ref 0.1–24)
NEUTROPHILS # BLD: 66.5 % (ref 37–92)
NEUTROPHILS ABS,2156: 4.7 K/UL (ref 2–7.8)
PLATELET # BLD AUTO: 174 K/UL (ref 140–440)
PMV BLD AUTO: 7.5 FL
POTASSIUM SERPL-SCNC: 4.9 MMOL/L (ref 3.6–5)
RBC # BLD AUTO: 4.07 M/UL (ref 4.2–6.3)
SODIUM SERPL-SCNC: 144 MMOL/L (ref 137–145)
WBC # BLD AUTO: 7 K/UL (ref 4.1–10.9)

## 2019-04-08 NOTE — PROGRESS NOTES
Chief Complaint Patient presents with  Diabetes  Hypertension Depression Risk Factor Screening:  
 
3 most recent PHQ Screens 2019 PHQ Not Done - Little interest or pleasure in doing things Not at all Feeling down, depressed, irritable, or hopeless Not at all Total Score PHQ 2 0 Functional Ability and Level of Safety: Activities of Daily Living ADL Assessment 2019 Feeding yourself No Help Needed Getting from bed to chair No Help Needed Getting dressed No Help Needed Bathing or showering No Help Needed Walk across the room (includes cane/walker) No Help Needed Using the telphone No Help Needed Taking your medications No Help Needed Preparing meals No Help Needed Managing money (expenses/bills) No Help Needed Moderately strenuous housework (laundry) No Help Needed Shopping for personal items (toiletries/medicines) No Help Needed Shopping for groceries No Help Needed Driving No Help Needed Climbing a flight of stairs No Help Needed Getting to places beyond walking distances Help Needed Fall Risk Fall Risk Assessment, last 12 mths 2019 Able to walk? Yes Fall in past 12 months? Yes Fall with injury? -  
Number of falls in past 12 months 1 Fall Risk Score -  
 
 
Abuse Screen Abuse Screening Questionnaire 2019 Do you ever feel afraid of your partner? Fely Boyd Are you in a relationship with someone who physically or mentally threatens you? Fely Boyd Is it safe for you to go home? Amaury Baum Reviewed record in preparation for visit and have obtained necessary documentation. Identified pt with two pt identifiers(name and ). Chief Complaint Patient presents with  Diabetes  Hypertension Wt Readings from Last 3 Encounters:  
19 221 lb 12.8 oz (100.6 kg) 19 212 lb 12.8 oz (96.5 kg) 10/02/18 223 lb (101.2 kg) Temp Readings from Last 3 Encounters:  
19 97.6 °F (36.4 °C) (Oral) 01/08/19 97.9 °F (36.6 °C) (Oral) 10/02/18 97.7 °F (36.5 °C) (Oral) BP Readings from Last 3 Encounters:  
04/08/19 132/64  
01/08/19 132/54  
10/02/18 160/73 Pulse Readings from Last 3 Encounters:  
04/08/19 60  
01/08/19 (!) 57  
10/02/18 65 Coordination of Care Questionnaire: 
:  
 
1) Have you been to an emergency room, urgent care clinic since your last visit? No  
 
Hospitalized since your last visit? No  
 
 
     
 
2) Have you seen or consulted any other health care providers outside of [address] [place] [address] since your last visit? No  
 
 
 
 
 
Patient Care Team  
Patient Care Team: 
Carson Medina MD as PCP - General (Internal Medicine) Michela Dockery MD as Surgeon (General Surgery) Simeon Cummings NP (Nurse Practitioner)

## 2019-04-08 NOTE — PROGRESS NOTES
Subjective:  
Mary Ann Almanza is a 82 y.o. male Chief Complaint Patient presents with  Diabetes  Hypertension History of present illness:  Mr. Abby Mosley returns in follow up. After his last visit here we did give him Cipro for a UTI and this cleared and he felt that his bowels improved to where he was having normal bowel movements as well. I am not sure what that is about, but will accept the outcome. He did have some issues with lower blood sugars while on the Cipro, which can be a side effect of Cipro, as well as other antibiotics. He never had a severe episode of hypoglycemia. He has recently noted increasing problems with increased lower extremity edema and intermittent blistering of his legs related to his venous insufficiency. I am not sure what to do short of increasing his diuretic, which I do not really want to do. I think he might benefit from the lymphedema clinic and we will see if there is one available in the [place] area. He denies orthopnea, PND or other symptoms of congestive heart failure. He is doing well otherwise. He did have a fall when he twisted quickly and could not catch himself with his walker, but had no significant injury this time. It did take him a considerable length of time to get back upright, however. Will follow up regarding his diabetes. His blood pressure is good today. Will also follow up regarding his anemia and thrombocytopenia. Will check him routinely in three months time, but hopefully get him involved in a lymphedema clinic in the meantime. Patient Active Problem List  
 Diagnosis Date Noted  Frequent falls 03/13/2015 Priority: 1 - One  Lower extremity weakness 03/13/2015 Priority: 1 - One  Lumbar spinal stenosis 03/13/2015 Priority: 1 - One  
 Controlled type 2 diabetes mellitus with microalbuminuria ([facility] [address] 06/27/2012 Priority: 1 - One  
 Essential hypertension, benign 06/27/2012   Priority: 1 - One  
  Hyperlipidemia, mixed 06/27/2012 Priority: 1 - One  
 CVD (cerebrovascular disease) 10/29/2017 Priority: 2 - Two  Proteinuria 07/26/2017 Priority: 2 - Two  
 Hiatal hernia 07/26/2017 Priority: 2 - Two  Dysphagia 07/26/2017 Priority: 2 - Two  Risk for falls 07/26/2017 Priority: 2 - Two  
 Heart valve disorder 07/26/2017 Priority: 2 - Two  Low back pain 03/13/2015 Priority: 2 - Two  Anemia 04/05/2019 Priority: 3 - Three  Long-term current use of high risk medication other than anticoagulant 07/02/2018 Priority: 3 - Three  BPH (benign prostatic hyperplasia) 10/29/2017 Priority: 3 - Three  Fatty liver 10/29/2017 Priority: 3 - Three  Gastroesophageal reflux disease with esophagitis 10/29/2017 Priority: 3 - Three  Thrombocytopenia ([address] 10/29/2017 Priority: 3 - Three  Venous stasis ulcers ([facility] [address] 07/26/2017 Priority: 3 - Three  Fecal soiling 07/26/2017 Priority: 3 - Three  Venous insufficiency of both lower extremities 07/26/2017 Priority: 3 - Three  Incomplete right bundle branch block 07/26/2017 Priority: 3 - Three  Chronic back pain 07/26/2017 Priority: 3 - Three  CKD (chronic kidney disease) stage 2, GFR 60-89 ml/min 10/21/2015 Priority: 3 - Three  COPD (chronic obstructive pulmonary disease) ([address] 06/27/2012 Priority: 3 - Three  Class 1 obesity due to excess calories with serious comorbidity in adult 12/31/2017 Priority: 4 - Four  OAB (overactive bladder) 10/29/2017 Priority: 4 - Four  Colon polyp 07/26/2017 Priority: 4 - Four  Compression fracture of L2 lumbar vertebra (HCC) 07/26/2017 Priority: 4 - Four  Right shoulder pain 07/26/2017 Priority: 4 - Four  Trigger middle finger of left hand 07/26/2017 Priority: 4 - Four  Allergic rhinitis 07/26/2017 Priority: 4 - Four  Tardy palsy of left ulnar nerve 07/26/2017 Priority: 4 - Four  Syncope 03/12/2015 Priority: 4 - Four  DJD (degenerative joint disease) of knee 06/27/2012 Priority: 4 - Four  History of DVT (deep vein thrombosis) 10/29/2017 Priority: 5 - Five  Vitamin D deficiency 10/29/2017 Priority: 5 - Five  Umbilical hernia 32/24/2919 Priority: 5 - Five  ED (erectile dysfunction) 07/26/2017 Priority: 5 - Five  Hearing loss 07/26/2017 Priority: 5 - Five  Right inguinal hernia 01/25/2018  Inguinal hernia of right side without obstruction or gangrene 01/05/2018  PE (physical exam), annual 10/29/2017 Past Surgical History:  
Procedure Laterality Date  COLONOSCOPY,DIAGNOSTIC  12/4/2014  HX BACK SURGERY  2015  
 bone spurs removed from lumbar spine (per pt)  HX CAROTID ENDARTERECTOMY    
 left  HX CATARACT REMOVAL    
 bilateral lens implant bilateral  
 HX HERNIA REPAIR  01/25/2018 Right inguinal hernia repir with mesh  HX ORTHOPAEDIC    
 foreign body removal-nail   right leg  SC EGD TRANSORAL BIOPSY SINGLE/MULTIPLE  1/31/2013  TOTAL KNEE ARTHROPLASTY    
 bilateral  
 UPPER GI ENDOSCOPY,BALL DIL,30MM  7/27/2017  UPPER GI ENDOSCOPY,BIOPSY  7/27/2017 Allergies Allergen Reactions  Pcn [Penicillins] Swelling Swelling of legs & feet  Oxycontin [Oxycodone] Other (comments) Agitation/felt irritable  Ciprofloxacin Other (comments) Pt states his blood sugar dropped when on Cipro, and he developed blisters on his feet.  Ibuprofen Other (comments) \"Rage\"  Niaspan [Niacin] Rash  Vesicare [Solifenacin] Other (comments) Attributes: Abdominal pain Family History Problem Relation Age of Onset  Cancer Mother  Hypertension Mother  Heart Disease Brother  Hypertension Brother  Hypertension Sister  Hypertension Sister Social History Socioeconomic History  Marital status:  Spouse name: Not on file  Number of children: Not on file  Years of education: Not on file  Highest education level: Not on file Occupational History  Not on file Social Needs  Financial resource strain: Not on file  Food insecurity:  
  Worry: Not on file Inability: Not on file  Transportation needs:  
  Medical: Not on file Non-medical: Not on file Tobacco Use  Smoking status: Former Smoker Packs/day: 2.00 Years: 35.00 Pack years: 70.00 Last attempt to quit: 1998 Years since quittin.8  Smokeless tobacco: Never Used Substance and Sexual Activity  Alcohol use: No  
  Alcohol/week: 3.5 oz Types: 7 Standard drinks or equivalent per week Comment: rarely  Drug use: No  
  Types: Prescription, OTC  Sexual activity: Not on file Lifestyle  Physical activity:  
  Days per week: Not on file Minutes per session: Not on file  Stress: Not on file Relationships  Social connections:  
  Talks on phone: Not on file Gets together: Not on file Attends Gnosticist service: Not on file Active member of club or organization: Not on file Attends meetings of clubs or organizations: Not on file Relationship status: Not on file  Intimate partner violence:  
  Fear of current or ex partner: Not on file Emotionally abused: Not on file Physically abused: Not on file Forced sexual activity: Not on file Other Topics Concern  Not on file Social History Narrative  Not on file Outpatient Medications Marked as Taking for the 19 encounter (Office Visit) with Easter Boxer, MD  
Medication Sig Dispense Refill  furosemide (LASIX) 80 mg tablet TAKE ONE AND ONE-HALF TABLETS DAILY 145 Tab 3  
 lisinopril (PRINIVIL, ZESTRIL) 40 mg tablet TAKE 1 TABLET DAILY 90 Tab 3  
 FREESTYLE LITE STRIPS strip USE ONCE DAILY 100 Strip 3  mometasone (ELOCON) 0.1 % topical cream APPLY 1 APPLICATION TWICE A  g 3  
 omega-3 acid ethyl esters (LOVAZA) 1 gram capsule Take 2 g by mouth two (2) times a day.  atorvastatin (LIPITOR) 40 mg tablet TAKE 1 TABLET DAILY 90 Tab 3  
 amLODIPine (NORVASC) 5 mg tablet TAKE 1 TABLET DAILY 90 Tab 3  
 metFORMIN ER (GLUCOPHAGE XR) 500 mg tablet TAKE 2 TABLETS TWICE A  Tab 3  
 atenolol (TENORMIN) 50 mg tablet TAKE 1 TABLET DAILY 90 Tab 3  cloNIDine HCl (CATAPRES) 0.1 mg tablet Take 1 Tab by mouth two (2) times a day. 180 Tab 3  
 nystatin-triamcinolone (MYCOLOG II) topical cream Apply  to affected area two (2) times a day. 180 g 5  clotrimazole-betamethasone (LOTRISONE) topical cream Apply  to affected area two (2) times a day.  fluticasone (FLONASE) 50 mcg/actuation nasal spray USE 2 SPRAYS NASALLY DAILY 2880 g 3  
 BLOOD-GLUCOSE METER (FREESTYLE LITE METER) by Does Not Apply route.  vardenafil (LEVITRA) 20 mg tablet Take 20 mg by mouth as needed.  PSYLLIUM SEED, WITH DEXTROSE, (FIBER PO) Take  by mouth daily. Indications: 2 TBSP daily  polyethylene glycol (MIRALAX) 17 gram/dose powder Take 17 g by mouth daily as needed.  naproxen sodium (ALEVE) 220 mg tablet Take 220 mg by mouth two (2) times daily as needed.  Cholecalciferol, Vitamin D3, (VITAMIN D3) 1,000 unit cap Take 1 Cap by mouth two (2) times a day.  multivitamin (MULTIPLE VITAMINS) tablet Take 1 Tab by mouth daily.  aspirin (ASPIRIN) 325 mg tablet Take 325 mg by mouth daily. Review of Systems Constitutional:  He denies fever, weight loss, sweats or fatigue. HEENT:  No blurred or double vision, headache or dizziness. No difficulty with swallowing, taste, speech or smell. Respiratory:  No cough, wheezing or shortness of breath. No sputum production. Cardiac:  Denies chest pain, palpitations, unexplained indigestion, syncope, edema, PND or orthopnea. GI:  No changes in bowel movements, no abdominal pain, no bloating, anorexia, nausea, vomiting or heartburn. :  No frequency or dysuria. Denies incontinence. Extremities:  swelling. Skin: severe stasis dermatitis LE's. Neurological:  No numbness, tingling, burning paresthesias or loss of motor strength. No syncope, dizziness, frequent headaches or memory loss. Objective:  
 
Vitals:  
 04/08/19 1011 BP: 132/64 Pulse: 60 Temp: 97.6 °F (36.4 °C) TempSrc: Oral  
SpO2: 97% Weight: 221 lb 12.8 oz (100.6 kg) Height: 6' 1\" (1.854 m) PainSc:   0 - No pain Body mass index is 29.26 kg/m². Physical Examination:  
           General Appearance:  Well-developed, well-nourished, no acute  distress. HEENT:   
 Ears:  The TMs and ear canals were clear. Eyes:  The pupillary responses were normal.  Extraocular muscle function intact. Lids and conjunctiva not injected. Neck:  Supple without thyromegaly or adenopathy. No JVD noted. Lungs:  Clear to auscultation and percussion. Cardiac:  Regular rate and rhythm without murmur. GI: nontender w/o mass. Normal BS's. Extremities: 2+ edema and erythema and superficial blisters LE's edema. Skin: stasis dermatitis. Neurological:  Grossly normal.     
 
 
 
Assessment/Plan:  
Impressions: ICD-10-CM ICD-9-CM 1. Essential hypertension, benign C68 873.5 METABOLIC PANEL, BASIC HEMOGLOBIN A1C WITH EAG 2. Controlled type 2 diabetes mellitus with microalbuminuria, without long-term current use of insulin ([facility]) V88.65 731.01 METABOLIC PANEL, BASIC  
 P38.3 791.0 HEMOGLOBIN A1C WITH EAG  
   CANCELED: HEMOGLOBIN A1C WITH EAG 3. Venous insufficiency of both lower extremities J37.8 368.42 METABOLIC PANEL, BASIC HEMOGLOBIN A1C WITH EAG 4. CKD (chronic kidney disease) stage 2, GFR 60-89 ml/min L79.2 383.8 METABOLIC PANEL, BASIC    HEMOGLOBIN A1C WITH EAG  
 5. Thrombocytopenia (HCC) D69.6 287.5 CBC WITH AUTOMATED DIFF  
   HEMOGLOBIN A1C WITH EAG 6. Anemia, unspecified type D64.9 285.9 CBC WITH AUTOMATED DIFF  
   HEMOGLOBIN A1C WITH EAG Plan: 1. Continue present meds 2. Discussed lifestyle modifications including Na restriction, low carb/fat diet, weight reduction and exercise (at least a walking program). 3. Referral to lymphedema clinic Follow-up and Dispositions · Return in about 3 months (around 7/8/2019) for Fasting. Orders Placed This Encounter  METABOLIC PANEL, BASIC ([facility])  CBC WITH AUTOMATED DIFF ([facility])  HEMOGLOBIN A1C WITH EAG Kelsy Solis MD

## 2019-04-09 LAB
EST. AVERAGE GLUCOSE BLD GHB EST-MCNC: 120 MG/DL
HBA1C MFR BLD: 5.8 % (ref 4.8–5.6)

## 2019-04-09 RX ORDER — CLONIDINE HYDROCHLORIDE 0.1 MG/1
TABLET ORAL
Qty: 180 TAB | Refills: 3 | Status: SHIPPED | OUTPATIENT
Start: 2019-04-09 | End: 2020-01-09 | Stop reason: SDUPTHER

## 2019-04-09 NOTE — PROGRESS NOTES
Kidney fct stable.  and A1C 5.8 so sugar well controlled. Hgb normal and platelet count normal.  No change in medications or treatment.

## 2019-04-09 NOTE — PROGRESS NOTES
PHI verified. Patient informed that Dr. Cyrus Carlton has reviewed lab results and stated Kidney fct stable.  and A1C 5.8 so sugar well controlled. Hgb normal and platelet count normal.  No change in medications or treatment.

## 2019-06-10 RX ORDER — NYSTATIN AND TRIAMCINOLONE ACETONIDE 100000; 1 [USP'U]/G; MG/G
CREAM TOPICAL 2 TIMES DAILY
Qty: 180 G | Refills: 3 | Status: SHIPPED | OUTPATIENT
Start: 2019-06-10

## 2019-06-10 RX ORDER — FLUTICASONE PROPIONATE 50 MCG
SPRAY, SUSPENSION (ML) NASAL
Qty: 2880 G | Refills: 3 | Status: SHIPPED | OUTPATIENT
Start: 2019-06-10 | End: 2020-09-02

## 2019-06-10 NOTE — TELEPHONE ENCOUNTER
PCP: Lynette Orellana MD    Last appt: 4/8/2019  Future Appointments   Date Time Provider Kin Mi   7/8/2019 10:30 AM Liu Jefferson MD [facility]       Requested Prescriptions     Pending Prescriptions Disp Refills    fluticasone propionate (FLONASE) 50 mcg/actuation nasal spray 2880 g 3     Sig: USE 2 SPRAYS NASALLY DAILY    nystatin-triamcinolone (MYCOLOG II) topical cream 180 g 3     Sig: Apply  to affected area two (2) times a day.        Prior labs and Blood pressures:  BP Readings from Last 3 Encounters:   04/08/19 132/64   01/08/19 132/54   10/02/18 160/73     Lab Results   Component Value Date/Time    Sodium 144 04/08/2019 10:46 AM    Potassium 4.9 04/08/2019 10:46 AM    Chloride 99 04/08/2019 10:46 AM    CO2 28.0 04/08/2019 10:46 AM    Anion gap 17 04/08/2019 10:46 AM    Glucose 132 (H) 04/08/2019 10:46 AM    BUN 61.0 (H) 04/08/2019 10:46 AM    Creatinine 0.7 (L) 04/08/2019 10:46 AM    BUN/Creatinine ratio 67 (H) 07/03/2018 09:34 AM    GFR est AA >60 04/08/2019 10:46 AM    GFR est non-AA >60 04/08/2019 10:46 AM    Calcium 9.5 04/08/2019 10:46 AM     Lab Results   Component Value Date/Time    Hemoglobin A1c 5.8 (H) 04/08/2019 10:46 AM    Hemoglobin A1c (POC) 6.5 (A) 01/08/2019 10:37 AM     Lab Results   Component Value Date/Time    Cholesterol, total 104 07/03/2018 09:34 AM    Cholesterol (POC) 111.0 01/08/2019 10:37 AM    HDL Cholesterol 29 (L) 07/03/2018 09:34 AM    HDL Cholesterol (POC) 32.0 (A) 01/08/2019 10:37 AM    LDL Cholesterol (POC) 54.4 01/08/2019 10:37 AM    LDL, calculated 50 07/03/2018 09:34 AM    VLDL, calculated 25 07/03/2018 09:34 AM    Triglyceride 124 07/03/2018 09:34 AM    Triglycerides (POC) 123.0 01/08/2019 10:37 AM     Lab Results   Component Value Date/Time    Vitamin D 25-Hydroxy 23.6 (L) 10/09/2015 01:16 PM       Lab Results   Component Value Date/Time    TSH 2.29 03/12/2015 06:05 PM

## 2019-06-14 ENCOUNTER — TELEPHONE (OUTPATIENT)
Dept: INTERNAL MEDICINE CLINIC | Age: 84
End: 2019-06-14

## 2019-06-14 NOTE — TELEPHONE ENCOUNTER
Returned call. Spoke with Olga Dixon. She states patient is scheduled to have a Carotid Artery Stenting with [facility] on Monday, 06-24-19, at [facility]. Surgery is to be done by Dr. Rambo Diana and Dr. Stu Conner. She is requesting last office note, labs, EKG, Stress test, and UA results. Dr. Rayray Jamison notified. Requested information faxed to [facility] at [phone].

## 2019-06-14 NOTE — TELEPHONE ENCOUNTER
[address] Pre Admin needs office notes and lab results for Mr Tomasz Cervantes as he is having surgery at [facility] next week.  PLEASE FAX TO: [phone]   Mainly interested in :  Stress Test   EKG   Urine results regarding Protein in urine  Labs  Office notes regarding all of the above

## 2019-07-08 ENCOUNTER — OFFICE VISIT (OUTPATIENT)
Dept: INTERNAL MEDICINE CLINIC | Age: 84
End: 2019-07-08

## 2019-07-08 VITALS
RESPIRATION RATE: 24 BRPM | HEART RATE: 82 BPM | WEIGHT: 209.6 LBS | SYSTOLIC BLOOD PRESSURE: 119 MMHG | BODY MASS INDEX: 28.39 KG/M2 | HEIGHT: 72 IN | OXYGEN SATURATION: 95 % | DIASTOLIC BLOOD PRESSURE: 66 MMHG | TEMPERATURE: 98.3 F

## 2019-07-08 DIAGNOSIS — Z79.899 LONG-TERM CURRENT USE OF HIGH RISK MEDICATION OTHER THAN ANTICOAGULANT: ICD-10-CM

## 2019-07-08 DIAGNOSIS — N18.2 CKD (CHRONIC KIDNEY DISEASE) STAGE 2, GFR 60-89 ML/MIN: ICD-10-CM

## 2019-07-08 DIAGNOSIS — I67.9 CVD (CEREBROVASCULAR DISEASE): ICD-10-CM

## 2019-07-08 DIAGNOSIS — R29.6 FREQUENT FALLS: ICD-10-CM

## 2019-07-08 DIAGNOSIS — R80.9 CONTROLLED TYPE 2 DIABETES MELLITUS WITH MICROALBUMINURIA, WITHOUT LONG-TERM CURRENT USE OF INSULIN (HCC): Primary | ICD-10-CM

## 2019-07-08 DIAGNOSIS — I10 ESSENTIAL HYPERTENSION, BENIGN: ICD-10-CM

## 2019-07-08 DIAGNOSIS — E78.2 HYPERLIPIDEMIA, MIXED: ICD-10-CM

## 2019-07-08 DIAGNOSIS — E11.29 CONTROLLED TYPE 2 DIABETES MELLITUS WITH MICROALBUMINURIA, WITHOUT LONG-TERM CURRENT USE OF INSULIN (HCC): Primary | ICD-10-CM

## 2019-07-08 DIAGNOSIS — M48.062 SPINAL STENOSIS OF LUMBAR REGION WITH NEUROGENIC CLAUDICATION: ICD-10-CM

## 2019-07-08 LAB
A-G RATIO,AGRAT: 1.4 RATIO
ALBUMIN SERPL-MCNC: 4.3 G/DL (ref 3.9–5.4)
ALP SERPL-CCNC: 113 U/L (ref 38–126)
ALT SERPL-CCNC: 30 U/L (ref 9–52)
ANION GAP SERPL CALC-SCNC: 13 MMOL/L
AST SERPL W P-5'-P-CCNC: 25 U/L (ref 14–36)
BILIRUB SERPL-MCNC: 0.5 MG/DL (ref 0.2–1.3)
BUN SERPL-MCNC: 45 MG/DL (ref 9–20)
BUN/CREATININE RATIO,BUCR: 64 RATIO
CALCIUM SERPL-MCNC: 9.8 MG/DL (ref 8.4–10.2)
CHLORIDE SERPL-SCNC: 106 MMOL/L (ref 98–107)
CHOL/HDL RATIO,CHHD: 4 RATIO (ref 0–4)
CHOLEST SERPL-MCNC: 114 MG/DL (ref 0–200)
CK SERPL-CCNC: 84 U/L (ref 30–135)
CO2 SERPL-SCNC: 25 MMOL/L (ref 22–32)
CREAT SERPL-MCNC: 0.7 MG/DL (ref 0.8–1.5)
ERYTHROCYTE [DISTWIDTH] IN BLOOD BY AUTOMATED COUNT: 16.4 %
GLOBULIN,GLOB: 3.1
GLUCOSE SERPL-MCNC: 145 MG/DL (ref 75–110)
HCT VFR BLD AUTO: 34.4 % (ref 37–51)
HDLC SERPL-MCNC: 27 MG/DL (ref 35–130)
HGB BLD-MCNC: 10.8 G/DL (ref 12–18)
LDL/HDL RATIO,LDHD: 2 RATIO
LDLC SERPL CALC-MCNC: 50 MG/DL (ref 0–130)
MCH RBC QN AUTO: 28.6 PG (ref 26–32)
MCHC RBC AUTO-ENTMCNC: 31.4 G/DL (ref 30–36)
MCV RBC AUTO: 90.9 FL (ref 80–97)
PLATELET # BLD AUTO: 268 K/UL (ref 140–440)
PMV BLD AUTO: 8 FL
POTASSIUM SERPL-SCNC: 5.5 MMOL/L (ref 3.6–5)
PROT SERPL-MCNC: 7.4 G/DL (ref 6.3–8.2)
RBC # BLD AUTO: 3.78 M/UL (ref 4.2–6.3)
SODIUM SERPL-SCNC: 144 MMOL/L (ref 137–145)
TRIGL SERPL-MCNC: 183 MG/DL (ref 0–200)
VLDLC SERPL CALC-MCNC: 37 MG/DL
WBC # BLD AUTO: 8 K/UL (ref 4.1–10.9)

## 2019-07-08 RX ORDER — GUAIFENESIN 100 MG/5ML
81 LIQUID (ML) ORAL DAILY
COMMUNITY

## 2019-07-08 RX ORDER — CLOPIDOGREL BISULFATE 75 MG/1
75 TABLET ORAL DAILY
COMMUNITY

## 2019-07-08 NOTE — PROGRESS NOTES
Reviewed record in preparation for visit and have obtained necessary documentation. Identified pt with two pt identifiers(name and ). Chief Complaint   Patient presents with    Hypertension    Diabetes    Cholesterol Problem        Coordination of Care Questionnaire:  :     1) Have you been to an emergency room, urgent care clinic since your last visit? No     Hospitalized since your last visit? Yes [address]  2019-2019              2) Have you seen or consulted any other health care providers outside of [address] [place] [address] since your last visit?  Yes Dr Katia Virk

## 2019-07-08 NOTE — PROGRESS NOTES
Subjective:   Lyn Gardner is a 82 y.o. male      Chief Complaint   Patient presents with    Hypertension    Diabetes    Cholesterol Problem        History of present illness:  Mr. Mitch Rousseau returns in follow up. Since last visit he underwent TCAR and stenting of his right carotid by Dr. Manjeet Hinojosa with no problems. He is now on aspirin 81 mg daily plus Plavix rather than the 325 aspirin he was on previously. He has had no recurrent TIA or stroke-like symptoms. 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 He uses his walker on a regular basis and tries to walk daily to strengthen. He has been through multiple courses of physical therapy without much improvement and I think he is at his baseline at the present time. His other medical problems include diabetes, hypertension, hyperlipidemia, CKD with proteinuria and a previous left carotid endarterectomy in addition to the recent TCAR. His lower extremity venous insufficiency and edema is improved. His medications are unchanged other than the issues with aspirin and Plavix. He denies issues with chest pain. He does note some dryness to his mouth, which has occurred since the surgery. He also notes very transient shortness of breath mid morning each day since his surgery, but he has had not had any orthopnea, PND or other symptoms suggestive of congestive heart failure, and the shortness of breath he experiences is very transient. It also seems to be improving, so I do not think we would pursue it unless we find something wrong with his labs, including a potential anemia. He denies other new issues. He is going to try to find a physician in his area around [place] for follow up, but we have made him an appointment to see Dr. Arian Wick in three months time in case he cannot find another doctor in the meantime.   Will let him know about labs and any adjustments from there.         Patient Active Problem List    Diagnosis Date Noted    Frequent falls 03/13/2015     Priority: 1 - One    Lower extremity weakness 03/13/2015     Priority: 1 - One    Lumbar spinal stenosis 03/13/2015     Priority: 1 - One    Controlled type 2 diabetes mellitus with microalbuminuria ([facility] [address] 06/27/2012     Priority: 1 - One    Essential hypertension, benign 06/27/2012     Priority: 1 - One    Hyperlipidemia, mixed 06/27/2012     Priority: 1 - One    CVD (cerebrovascular disease) 10/29/2017     Priority: 2 - Two    Proteinuria 07/26/2017     Priority: 2 - Two    Hiatal hernia 07/26/2017     Priority: 2 - Two    Dysphagia 07/26/2017     Priority: 2 - Two    Risk for falls 07/26/2017     Priority: 2 - Two    Heart valve disorder 07/26/2017     Priority: 2 - Two    Low back pain 03/13/2015     Priority: 2 - Two    Anemia 04/05/2019     Priority: 3 - Three    Long-term current use of high risk medication other than anticoagulant 07/02/2018     Priority: 3 - Three    BPH (benign prostatic hyperplasia) 10/29/2017     Priority: 3 - Three    Fatty liver 10/29/2017     Priority: 3 - Three    Gastroesophageal reflux disease with esophagitis 10/29/2017     Priority: 3 - Three    Thrombocytopenia ([address] 10/29/2017     Priority: 3 - Three    Venous stasis ulcers ([facility] [address] 07/26/2017     Priority: 3 - Three    Fecal soiling 07/26/2017     Priority: 3 - Three    Venous insufficiency of both lower extremities 07/26/2017     Priority: 3 - Three    Incomplete right bundle branch block 07/26/2017     Priority: 3 - Three    Chronic back pain 07/26/2017     Priority: 3 - Three    CKD (chronic kidney disease) stage 2, GFR 60-89 ml/min 10/21/2015     Priority: 3 - Three    COPD (chronic obstructive pulmonary disease) ([address] 06/27/2012     Priority: 3 - Three    Class 1 obesity due to excess calories with serious comorbidity in adult 12/31/2017     Priority: 4 - Four    OAB (overactive bladder) 10/29/2017     Priority: 4 - Four    Colon polyp 07/26/2017     Priority: 4 - Four    Compression fracture of L2 lumbar vertebra ([address] 07/26/2017     Priority: 4 - Four    Right shoulder pain 07/26/2017     Priority: 4 - Four    Trigger middle finger of left hand 07/26/2017     Priority: 4 - Four    Allergic rhinitis 07/26/2017     Priority: 4 - Four    Tardy palsy of left ulnar nerve 07/26/2017     Priority: 4 - Four    Syncope 03/12/2015     Priority: 4 - Four    DJD (degenerative joint disease) of knee 06/27/2012     Priority: 4 - Four    History of DVT (deep vein thrombosis) 10/29/2017     Priority: 5 - Five    Vitamin D deficiency 10/29/2017     Priority: 5 - Five    Umbilical hernia 80/46/1953     Priority: 5 - Five    ED (erectile dysfunction) 07/26/2017     Priority: 5 - Five    Hearing loss 07/26/2017     Priority: 5 - Five    Right inguinal hernia 01/25/2018    Inguinal hernia of right side without obstruction or gangrene 01/05/2018    PE (physical exam), annual 10/29/2017      Past Surgical History:   Procedure Laterality Date    COLONOSCOPY,DIAGNOSTIC  12/4/2014         HX BACK SURGERY  2015    bone spurs removed from lumbar spine (per pt)    HX CAROTID ENDARTERECTOMY      left    HX CATARACT REMOVAL      bilateral lens implant bilateral    HX HERNIA REPAIR  01/25/2018    Right inguinal hernia repir with mesh    HX ORTHOPAEDIC      foreign body removal-nail   right leg    CA EGD TRANSORAL BIOPSY SINGLE/MULTIPLE  1/31/2013         TOTAL KNEE ARTHROPLASTY      bilateral    UPPER GI ENDOSCOPY,BALL DIL,30MM  7/27/2017         UPPER GI ENDOSCOPY,BIOPSY  7/27/2017           Allergies   Allergen Reactions    Pcn [Penicillins] Swelling     Swelling of legs & feet    Oxycontin [Oxycodone] Other (comments)     Agitation/felt irritable    Ciprofloxacin Other (comments)     Pt states his blood sugar dropped when on Cipro, and he developed blisters on his feet.       Ibuprofen Other (comments)     \"Rage\"    Niaspan [Niacin] Rash    Vesicare [Solifenacin] Other (comments)     Attributes: Abdominal pain      Family History   Problem Relation Age of Onset   [facility] Mother     Hypertension Mother     Heart Disease Brother     Hypertension Brother     Hypertension Sister     Hypertension Sister       Social History     Socioeconomic History    Marital status:      Spouse name: Not on file    Number of children: Not on file    Years of education: Not on file    Highest education level: Not on file   Occupational History    Not on file   Social Needs    Financial resource strain: Not on file    Food insecurity:     Worry: Not on file     Inability: Not on file    Transportation needs:     Medical: Not on file     Non-medical: Not on file   Tobacco Use    Smoking status: Former Smoker     Packs/day: 2.00     Years: 35.00     Pack years: 70.00     Last attempt to quit: 1998     Years since quittin.0    Smokeless tobacco: Never Used   Substance and Sexual Activity    Alcohol use: No     Alcohol/week: 3.5 oz     Types: 7 Standard drinks or equivalent per week     Comment: rarely    Drug use: No     Types: Prescription, OTC    Sexual activity: Not on file   Lifestyle    Physical activity:     Days per week: Not on file     Minutes per session: Not on file    Stress: Not on file   Relationships    Social connections:     Talks on phone: Not on file     Gets together: Not on file     Attends Taoist service: Not on file     Active member of club or organization: Not on file     Attends meetings of clubs or organizations: Not on file     Relationship status: Not on file    Intimate partner violence:     Fear of current or ex partner: Not on file     Emotionally abused: Not on file     Physically abused: Not on file     Forced sexual activity: Not on file   Other Topics Concern    Not on file   Social History Narrative    Not on file     Outpatient Medications Marked as Taking for the 7/8/19 encounter (Office Visit) with Karolyn Orellana MD   Medication Sig Dispense Refill    aspirin 81 mg chewable tablet Take 81 mg by mouth daily.  clopidogrel (PLAVIX) 75 mg tab Take  by mouth.  fluticasone propionate (FLONASE) 50 mcg/actuation nasal spray USE 2 SPRAYS NASALLY DAILY 2880 g 3    nystatin-triamcinolone (MYCOLOG II) topical cream Apply  to affected area two (2) times a day. 180 g 3    cloNIDine HCl (CATAPRES) 0.1 mg tablet TAKE 1 TABLET TWICE A  Tab 3    furosemide (LASIX) 80 mg tablet TAKE ONE AND ONE-HALF TABLETS DAILY 145 Tab 3    lisinopril (PRINIVIL, ZESTRIL) 40 mg tablet TAKE 1 TABLET DAILY 90 Tab 3    FREESTYLE LITE STRIPS strip USE ONCE DAILY 100 Strip 3    mometasone (ELOCON) 0.1 % topical cream APPLY 1 APPLICATION TWICE A  g 3    omega-3 acid ethyl esters (LOVAZA) 1 gram capsule Take 2 g by mouth two (2) times a day.  atorvastatin (LIPITOR) 40 mg tablet TAKE 1 TABLET DAILY 90 Tab 3    amLODIPine (NORVASC) 5 mg tablet TAKE 1 TABLET DAILY 90 Tab 3    metFORMIN ER (GLUCOPHAGE XR) 500 mg tablet TAKE 2 TABLETS TWICE A  Tab 3    atenolol (TENORMIN) 50 mg tablet TAKE 1 TABLET DAILY 90 Tab 3    clotrimazole-betamethasone (LOTRISONE) topical cream Apply  to affected area two (2) times a day.  BLOOD-GLUCOSE METER (FREESTYLE LITE METER) by Does Not Apply route.  vardenafil (LEVITRA) 20 mg tablet Take 20 mg by mouth as needed.  PSYLLIUM SEED, WITH DEXTROSE, (FIBER PO) Take  by mouth daily. Indications: 2 TBSP daily      polyethylene glycol (MIRALAX) 17 gram/dose powder Take 17 g by mouth daily as needed.  naproxen sodium (ALEVE) 220 mg tablet Take 220 mg by mouth two (2) times daily as needed.  Cholecalciferol, Vitamin D3, (VITAMIN D3) 1,000 unit cap Take 1 Cap by mouth two (2) times a day.  multivitamin (MULTIPLE VITAMINS) tablet Take 1 Tab by mouth daily.           Review of Systems              Constitutional:  He denies fever, weight loss, sweats or fatigue. HEENT:  No blurred or double vision, headache or dizziness. No difficulty with swallowing, taste, speech or smell. Respiratory:  No cough, wheezing or shortness of breath. No sputum production. Cardiac:  Denies chest pain, palpitations, unexplained indigestion, syncope, edema, PND or orthopnea. GI:  No changes in bowel movements, no abdominal pain, no bloating, anorexia, nausea, vomiting or heartburn. :  No frequency or dysuria. Denies incontinence. Extremities:   swelling. Skin:  No recent rashes or mole changes. Neurological:  No numbness, tingling, burning paresthesias or loss of motor strength. No syncope, dizziness, frequent headaches or memory loss. Objective:     Vitals:    07/08/19 1121   BP: 119/66   Pulse: 82   Resp: 24   Temp: 98.3 °F (36.8 °C)   TempSrc: Oral   SpO2: 95%   Weight: 209 lb 9.6 oz (95.1 kg)   Height: 5' 11.5\" (1.816 m)   PainSc:   0 - No pain       Body mass index is 28.83 kg/m². Physical Examination:              General Appearance:  Well-developed, well-nourished, no acute  distress. HEENT:     Ears:  The TMs and ear canals were clear. Eyes:  The pupillary responses were normal.  Extraocular muscle function intact. Lids and conjunctiva not injected. Neck:  Supple without thyromegaly or adenopathy. No JVD noted. Lungs:  Clear to auscultation and percussion. Cardiac:  Regular rate and rhythm without murmur. GI: nontender w/o mass. Normal BS's. Extremities:  1+ edema. Skin:  No rash or unusual mole changes noted. Neurological:  Grossly normal. Except LE weakness           Assessment/Plan:   Impressions:      ICD-10-CM ICD-9-CM    1. Controlled type 2 diabetes mellitus with microalbuminuria, without long-term current use of insulin ([facility]) O48.61 210.70 METABOLIC PANEL, COMPREHENSIVE    R80.9 791.0 HEMOGLOBIN A1C WITH EAG   2.  Essential hypertension, benign U70 921.0 METABOLIC PANEL, COMPREHENSIVE   3. Hyperlipidemia, mixed I04.0 460.5 METABOLIC PANEL, COMPREHENSIVE      LIPID PANEL   4. Frequent falls R29.6 V15.88 CBC W/O DIFF      METABOLIC PANEL, COMPREHENSIVE   5. Spinal stenosis of lumbar region with neurogenic claudication M48.062 724.03    6. CVD (cerebrovascular disease) W03.7 930.4 METABOLIC PANEL, COMPREHENSIVE   7. CKD (chronic kidney disease) stage 2, GFR 60-89 ml/min N18.2 585.2 CBC W/O DIFF      METABOLIC PANEL, COMPREHENSIVE   8. Long-term current use of high risk medication other than anticoagulant Z79.899 V58.69 CK        Plan:  1. Continue present meds  2. Discussed lifestyle modifications including Na restriction, low carb/fat diet, weight reduction and exercise (at least a walking program). Follow-up and Dispositions    · Return in about 3 months (around 10/8/2019).            Orders Placed This Encounter    CBC W/O DIFF ([facility])    METABOLIC PANEL, COMPREHENSIVE ([facility])    LIPID PANEL ([facility])    CK ([facility])    HEMOGLOBIN A1C WITH SHAKIR Trevizo MD

## 2019-07-09 LAB
EST. AVERAGE GLUCOSE BLD GHB EST-MCNC: 126 MG/DL
HBA1C MFR BLD: 6 % (ref 4.8–5.6)

## 2019-07-09 NOTE — PROGRESS NOTES
A1C good 6.0. Lipids excellent. K+ slightly high. Avoid K+ rich foods. May need to adjust lisinopril. Hgb low after surgery. Start Ferrous sulfate 325 mg daily (OTC). .  Make appt for Brandon 1 month to recheck CBC, BMP.

## 2019-07-10 NOTE — PROGRESS NOTES
PHI verified and patient informed that per Dr Larissa Neal A1C good 6.0. Lipids excellent. K+ slightly high. Avoid K+ rich foods. May need to adjust lisinopril. Hgb low after surgery. Start Ferrous sulfate 325 mg daily (OTC). .  Make appt for Brandon 1 month to recheck CBC, BMP.  Patient scheduled for NP Brandon Trevino 8/10/2019 at 10:15am.

## 2019-07-22 RX ORDER — ATENOLOL 50 MG/1
TABLET ORAL
Qty: 90 TAB | Refills: 3 | Status: SHIPPED | OUTPATIENT
Start: 2019-07-22 | End: 2020-07-16

## 2019-08-20 ENCOUNTER — OFFICE VISIT (OUTPATIENT)
Dept: INTERNAL MEDICINE CLINIC | Age: 84
End: 2019-08-20

## 2019-08-20 DIAGNOSIS — I10 ESSENTIAL HYPERTENSION, BENIGN: Primary | ICD-10-CM

## 2019-08-20 DIAGNOSIS — R80.9 CONTROLLED TYPE 2 DIABETES MELLITUS WITH MICROALBUMINURIA, WITHOUT LONG-TERM CURRENT USE OF INSULIN (HCC): ICD-10-CM

## 2019-08-20 DIAGNOSIS — D64.9 ANEMIA, UNSPECIFIED TYPE: ICD-10-CM

## 2019-08-20 DIAGNOSIS — E11.29 CONTROLLED TYPE 2 DIABETES MELLITUS WITH MICROALBUMINURIA, WITHOUT LONG-TERM CURRENT USE OF INSULIN (HCC): ICD-10-CM

## 2019-08-20 LAB
ANION GAP SERPL CALC-SCNC: 12 MMOL/L
BUN SERPL-MCNC: 43 MG/DL (ref 9–20)
CALCIUM SERPL-MCNC: 9.6 MG/DL (ref 8.4–10.2)
CHLORIDE SERPL-SCNC: 107 MMOL/L (ref 98–107)
CO2 SERPL-SCNC: 27 MMOL/L (ref 22–32)
CREAT SERPL-MCNC: 0.7 MG/DL (ref 0.8–1.5)
GLUCOSE SERPL-MCNC: 130 MG/DL (ref 75–110)
POTASSIUM SERPL-SCNC: 5.9 MMOL/L (ref 3.6–5)
SODIUM SERPL-SCNC: 146 MMOL/L (ref 137–145)

## 2019-08-20 RX ORDER — LANOLIN ALCOHOL/MO/W.PET/CERES
CREAM (GRAM) TOPICAL
COMMUNITY
End: 2019-10-02

## 2019-08-20 NOTE — PATIENT INSTRUCTIONS
High Blood Pressure: Care Instructions  Overview    It's normal for blood pressure to go up and down throughout the day. But if it stays up, you have high blood pressure. Another name for high blood pressure is hypertension. Despite what a lot of people think, high blood pressure usually doesn't cause headaches or make you feel dizzy or lightheaded. It usually has no symptoms. But it does increase your risk of stroke, heart attack, and other problems. You and your doctor will talk about your risks of these problems based on your blood pressure. Your doctor will give you a goal for your blood pressure. Your goal will be based on your health and your age. Lifestyle changes, such as eating healthy and being active, are always important to help lower blood pressure. You might also take medicine to reach your blood pressure goal.  Follow-up care is a key part of your treatment and safety. Be sure to make and go to all appointments, and call your doctor if you are having problems. It's also a good idea to know your test results and keep a list of the medicines you take. How can you care for yourself at home? Medical treatment  · If you stop taking your medicine, your blood pressure will go back up. You may take one or more types of medicine to lower your blood pressure. Be safe with medicines. Take your medicine exactly as prescribed. Call your doctor if you think you are having a problem with your medicine. · Talk to your doctor before you start taking aspirin every day. Aspirin can help certain people lower their risk of a heart attack or stroke. But taking aspirin isn't right for everyone, because it can cause serious bleeding. · See your doctor regularly. You may need to see the doctor more often at first or until your blood pressure comes down. · If you are taking blood pressure medicine, talk to your doctor before you take decongestants or anti-inflammatory medicine, such as ibuprofen.  Some of these medicines can raise blood pressure. · Learn how to check your blood pressure at home. Lifestyle changes  · Stay at a healthy weight. This is especially important if you put on weight around the waist. Losing even 10 pounds can help you lower your blood pressure. · If your doctor recommends it, get more exercise. Walking is a good choice. Bit by bit, increase the amount you walk every day. Try for at least 30 minutes on most days of the week. You also may want to swim, bike, or do other activities. · Avoid or limit alcohol. Talk to your doctor about whether you can drink any alcohol. · Try to limit how much sodium you eat to less than 2,300 milligrams (mg) a day. Your doctor may ask you to try to eat less than 1,500 mg a day. · Eat plenty of fruits (such as bananas and oranges), vegetables, legumes, whole grains, and low-fat dairy products. · Lower the amount of saturated fat in your diet. Saturated fat is found in animal products such as milk, cheese, and meat. Limiting these foods may help you lose weight and also lower your risk for heart disease. · Do not smoke. Smoking increases your risk for heart attack and stroke. If you need help quitting, talk to your doctor about stop-smoking programs and medicines. These can increase your chances of quitting for good. When should you call for help? Call 911 anytime you think you may need emergency care. This may mean having symptoms that suggest that your blood pressure is causing a serious heart or blood vessel problem. Your blood pressure may be over 180/120.   For example, call 911 if:    · You have symptoms of a heart attack. These may include:  ? Chest pain or pressure, or a strange feeling in the chest.  ? Sweating. ? Shortness of breath. ? Nausea or vomiting. ? Pain, pressure, or a strange feeling in the back, neck, jaw, or upper belly or in one or both shoulders or arms. ? Lightheadedness or sudden weakness.   ? A fast or irregular heartbeat.     · You have symptoms of a stroke. These may include:  ? Sudden numbness, tingling, weakness, or loss of movement in your face, arm, or leg, especially on only one side of your body. ? Sudden vision changes. ? Sudden trouble speaking. ? Sudden confusion or trouble understanding simple statements. ? Sudden problems with walking or balance. ? A sudden, severe headache that is different from past headaches.     · You have severe back or belly pain.    Do not wait until your blood pressure comes down on its own. Get help right away.   Call your doctor now or seek immediate care if:    · Your blood pressure is much higher than normal (such as 180/120 or higher), but you don't have symptoms.     · You think high blood pressure is causing symptoms, such as:  ? Severe headache.  ? Blurry vision.    Watch closely for changes in your health, and be sure to contact your doctor if:    · Your blood pressure measures higher than your doctor recommends at least 2 times. That means the top number is higher or the bottom number is higher, or both.     · You think you may be having side effects from your blood pressure medicine. Where can you learn more? Go to http://julio césar-precious.info/. Enter U056 in the search box to learn more about \"High Blood Pressure: Care Instructions. \"  Current as of: July 22, 2018  Content Version: 12.1  © 0971-9000 [employer]. Care instructions adapted under license by [employer] (which disclaims liability or warranty for this information). If you have questions about a medical condition or this instruction, always ask your healthcare professional. [place] [address] any warranty or liability for your use of this information. Anemia: Care Instructions  Your Care Instructions    Anemia is a low level of red blood cells, which carry oxygen throughout your body. Many things can cause anemia.  Lack of iron is one of the most common causes. Your body needs iron to make hemoglobin, a substance in red blood cells that carries oxygen from the lungs to your body's cells. Without enough iron, the body produces fewer and smaller red blood cells. As a result, your body's cells do not get enough oxygen, and you feel tired and weak. And you may have trouble concentrating. Bleeding is the most common cause of a lack of iron. You may have heavy menstrual bleeding or bleeding caused by conditions such as ulcers, hemorrhoids, or cancer. Regular use of aspirin or other anti-inflammatory medicines (such as ibuprofen) also can cause bleeding in some people. A lack of iron in your diet also can cause anemia, especially at times when the body needs more iron, such as during pregnancy, infancy, and the teen years. Your doctor may have prescribed iron pills. It may take several months of treatment for your iron levels to return to normal. Your doctor also may suggest that you eat foods that are rich in iron, such as meat and beans. There are many other causes of anemia. It is not always due to a lack of iron. Finding the specific cause of your anemia will help your doctor find the right treatment for you. Follow-up care is a key part of your treatment and safety. Be sure to make and go to all appointments, and call your doctor if you are having problems. It's also a good idea to know your test results and keep a list of the medicines you take. How can you care for yourself at home? · Take your medicines exactly as prescribed. Call your doctor if you think you are having a problem with your medicine. · If your doctor recommends iron pills, take them as directed:  ? Try to take the pills on an empty stomach about 1 hour before or 2 hours after meals. But you may need to take iron with food to avoid an upset stomach.   ? Do not take antacids or drink milk or caffeine drinks (such as coffee, tea, or cola) at the same time or within 2 hours of the time that you take your iron. They can make it hard for your body to absorb the iron. ? Vitamin C (from food or supplements) helps your body absorb iron. Try taking iron pills with a glass of orange juice or some other food that is high in vitamin C, such as citrus fruits. ? Iron pills may cause stomach problems, such as heartburn, nausea, diarrhea, constipation, and cramps. Be sure to drink plenty of fluids, and include fruits, vegetables, and fiber in your diet each day. Iron pills often make your bowel movements dark or green. ? If you forget to take an iron pill, do not take a double dose of iron the next time you take a pill. ? Keep iron pills out of the reach of small children. An overdose of iron can be very dangerous. · Follow your doctor's advice about eating iron-rich foods. These include red meat, shellfish, poultry, eggs, beans, raisins, whole-grain bread, and leafy green vegetables. · Steam vegetables to help them keep their iron content. When should you call for help? Call 911 anytime you think you may need emergency care. For example, call if:    · You have symptoms of a heart attack. These may include:  ? Chest pain or pressure, or a strange feeling in the chest.  ? Sweating. ? Shortness of breath. ? Nausea or vomiting. ? Pain, pressure, or a strange feeling in the back, neck, jaw, or upper belly or in one or both shoulders or arms. ? Lightheadedness or sudden weakness. ? A fast or irregular heartbeat. After you call 911, the  may tell you to chew 1 adult-strength or 2 to 4 low-dose aspirin. Wait for an ambulance. Do not try to drive yourself.     · You passed out (lost consciousness).    Call your doctor now or seek immediate medical care if:    · You have new or increased shortness of breath.     · You are dizzy or lightheaded, or you feel like you may faint.     · Your fatigue and weakness continue or get worse.     · You have any abnormal bleeding, such as:  ? Nosebleeds.   ? Vaginal bleeding that is different (heavier, more frequent, at a different time of the month) than what you are used to.  ? Bloody or black stools, or rectal bleeding. ? Bloody or pink urine.    Watch closely for changes in your health, and be sure to contact your doctor if:    · You do not get better as expected. Where can you learn more? Go to http://julio césar-precious.info/. Enter R301 in the search box to learn more about \"Anemia: Care Instructions. \"  Current as of: March 28, 2019  Content Version: 12.1  © 9137-9306 [employer]. Care instructions adapted under license by [employer] (which disclaims liability or warranty for this information). If you have questions about a medical condition or this instruction, always ask your healthcare professional. [address] any warranty or liability for your use of this information.

## 2019-08-20 NOTE — PROGRESS NOTES
Chief Complaint   Patient presents with    Diabetes     1 month f/up    Hypertension    Fall     lumbar stenosis    Chronic Kidney Disease       1. Have you been to the ER, urgent care clinic since your last visit? Hospitalized since your last visit? No    2. Have you seen or consulted any other health care providers outside of the [address] [place] [address] since your last visit? Include any pap smears or colon screening.  No

## 2019-08-21 VITALS
RESPIRATION RATE: 18 BRPM | HEART RATE: 60 BPM | WEIGHT: 207 LBS | OXYGEN SATURATION: 95 % | SYSTOLIC BLOOD PRESSURE: 140 MMHG | DIASTOLIC BLOOD PRESSURE: 60 MMHG | BODY MASS INDEX: 28.98 KG/M2 | TEMPERATURE: 97.5 F | HEIGHT: 71 IN

## 2019-08-21 LAB
BASOPHILS # BLD AUTO: 0 X10E3/UL (ref 0–0.2)
BASOPHILS NFR BLD AUTO: 0 %
EOSINOPHIL # BLD AUTO: 0.3 X10E3/UL (ref 0–0.4)
EOSINOPHIL NFR BLD AUTO: 6 %
ERYTHROCYTE [DISTWIDTH] IN BLOOD BY AUTOMATED COUNT: 17.7 % (ref 12.3–15.4)
HCT VFR BLD AUTO: 31.1 % (ref 37.5–51)
HGB BLD-MCNC: 9.7 G/DL (ref 13–17.7)
IMM GRANULOCYTES # BLD AUTO: 0 X10E3/UL (ref 0–0.1)
IMM GRANULOCYTES NFR BLD AUTO: 0 %
LYMPHOCYTES # BLD AUTO: 1.3 X10E3/UL (ref 0.7–3.1)
LYMPHOCYTES NFR BLD AUTO: 23 %
MCH RBC QN AUTO: 28 PG (ref 26.6–33)
MCHC RBC AUTO-ENTMCNC: 31.2 G/DL (ref 31.5–35.7)
MCV RBC AUTO: 90 FL (ref 79–97)
MONOCYTES # BLD AUTO: 0.5 X10E3/UL (ref 0.1–0.9)
MONOCYTES NFR BLD AUTO: 9 %
NEUTROPHILS # BLD AUTO: 3.6 X10E3/UL (ref 1.4–7)
NEUTROPHILS NFR BLD AUTO: 62 %
PLATELET # BLD AUTO: 146 X10E3/UL (ref 150–450)
RBC # BLD AUTO: 3.47 X10E6/UL (ref 4.14–5.8)
WBC # BLD AUTO: 5.9 X10E3/UL (ref 3.4–10.8)

## 2019-08-21 NOTE — PROGRESS NOTES
Subjective:  Mr. Kelsy Ohara is a pleasant 82 YOM, former patient of Dr. WILLIAM AUGUSTINE, who comes in today for a blood pressure check. He is currently managed on Tenormin 50 mg daily, Clonidine 0.1 mg twice daily, Furosemide 80 mg, 1 1/2 tablets daily, Lisinopril 40 mg daily and Norvasc 5 mg daily. Today he specifically denies headaches, dizziness or blurred vision, denies chest pain or palpitations. He denies SOB, cough, wheezing, PND or orthopnea. He notes occasional ankle edema. Further discussion with him revealed that he has found a PCP in the [place] area, so he will no longer have to travel here. His friend was in attendance today. Otherwise today he has no other complaints. He is also a diabetic, managed on Glucophage  mg, two tablets twice daily, along. He denies any side effects from the medication. He does not check his blood sugar.     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    Chronic kidney disease     Chronic pain     low back    CKD (chronic kidney disease) stage 2, GFR 60-89 ml/min     Class 1 obesity due to excess calories with serious comorbidity in adult 12/31/2017    Colon polyp 7/26/2017    Onset Date: 11/2001 Comments: H/O    Compression fracture of L2 lumbar vertebra ([address] 7/26/2017    Problem onset is 1985 Story: L2     COPD (chronic obstructive pulmonary disease) ([address]     DDD (degenerative disc disease)     chronic back pain    Diabetes ([address]     borderline    DJD (degenerative joint disease)     DVT (deep venous thrombosis) ([address]     1998    Dysphagia 7/26/2017    ED (erectile dysfunction) 7/26/2017    Edema extremities 7/26/2017    Fatty liver     Fecal soiling 7/26/2017    Frequent falls     GERD (gastroesophageal reflux disease)     H/O adenomatous polyp of colon     Hearing loss 7/26/2017    Story: wears bilat hearing aids     Heart valve disorder 7/26/2017    Story: mild MR/AI    Hiatal hernia 7/26/2017    Hypercholesterolemia     Hypertension     Incomplete right bundle branch block 7/26/2017    MR (mitral regurgitation)     Overactive bladder     Proteinuria 7/26/2017    Problem onset is 1975     PUD (peptic ulcer disease)     in late teens   [facility] Right shoulder pain 7/26/2017    Risk for falls 7/26/2017    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    Thrombocytopenia (HCC)     Trigger middle finger of left hand 5/48/3942    Umbilical hernia 7/83/7046    Venous insufficiency (chronic) (peripheral)     Venous stasis ulcers ([address] 7/26/2017    Vitamin D deficiency      Past Surgical History:   Procedure Laterality Date    COLONOSCOPY,DIAGNOSTIC  12/4/2014         HX BACK SURGERY  2015    bone spurs removed from lumbar spine (per pt)    HX CAROTID ENDARTERECTOMY      left    HX CATARACT REMOVAL      bilateral lens implant bilateral    HX HERNIA REPAIR  01/25/2018    Right inguinal hernia repir with mesh    HX ORTHOPAEDIC      foreign body removal-nail   right leg    MO EGD TRANSORAL BIOPSY SINGLE/MULTIPLE  1/31/2013         TOTAL KNEE ARTHROPLASTY      bilateral    UPPER GI ENDOSCOPY,BALL DIL,30MM  7/27/2017         UPPER GI ENDOSCOPY,BIOPSY  7/27/2017            Current Outpatient Medications on File Prior to Visit   Medication Sig Dispense Refill    ferrous sulfate 325 mg (65 mg iron) tablet Take  by mouth Daily (before breakfast).  atenolol (TENORMIN) 50 mg tablet TAKE 1 TABLET DAILY 90 Tab 3    aspirin 81 mg chewable tablet Take 81 mg by mouth daily.  clopidogrel (PLAVIX) 75 mg tab Take  by mouth.       fluticasone propionate (FLONASE) 50 mcg/actuation nasal spray USE 2 SPRAYS NASALLY DAILY 2880 g 3    nystatin-triamcinolone (MYCOLOG II) topical cream Apply  to affected area two (2) times a day. 180 g 3    cloNIDine HCl (CATAPRES) 0.1 mg tablet TAKE 1 TABLET TWICE A  Tab 3    furosemide (LASIX) 80 mg tablet TAKE ONE AND ONE-HALF TABLETS DAILY 145 Tab 3    lisinopril (PRINIVIL, ZESTRIL) 40 mg tablet TAKE 1 TABLET DAILY 90 Tab 3    FREESTYLE LITE STRIPS strip USE ONCE DAILY 100 Strip 3    mometasone (ELOCON) 0.1 % topical cream APPLY 1 APPLICATION TWICE A  g 3    omega-3 acid ethyl esters (LOVAZA) 1 gram capsule Take 2 g by mouth two (2) times a day.  atorvastatin (LIPITOR) 40 mg tablet TAKE 1 TABLET DAILY 90 Tab 3    amLODIPine (NORVASC) 5 mg tablet TAKE 1 TABLET DAILY 90 Tab 3    metFORMIN ER (GLUCOPHAGE XR) 500 mg tablet TAKE 2 TABLETS TWICE A  Tab 3    clotrimazole-betamethasone (LOTRISONE) topical cream Apply  to affected area two (2) times a day.  BLOOD-GLUCOSE METER (FREESTYLE LITE METER) by Does Not Apply route.  vardenafil (LEVITRA) 20 mg tablet Take 20 mg by mouth as needed.  PSYLLIUM SEED, WITH DEXTROSE, (FIBER PO) Take  by mouth daily. Indications: 2 TBSP daily      polyethylene glycol (MIRALAX) 17 gram/dose powder Take 17 g by mouth daily as needed.  naproxen sodium (ALEVE) 220 mg tablet Take 220 mg by mouth two (2) times daily as needed.  Cholecalciferol, Vitamin D3, (VITAMIN D3) 1,000 unit cap Take 1 Cap by mouth two (2) times a day.  multivitamin (MULTIPLE VITAMINS) tablet Take 1 Tab by mouth daily. No current facility-administered medications on file prior to visit. Allergies   Allergen Reactions    Pcn [Penicillins] Swelling     Swelling of legs & feet    Oxycontin [Oxycodone] Other (comments)     Agitation/felt irritable    Ciprofloxacin Other (comments)     Pt states his blood sugar dropped when on Cipro, and he developed blisters on his feet.       Ibuprofen Other (comments)     \"Rage\"    Niaspan [Niacin] Rash    Vesicare [Solifenacin] Other (comments)     Attributes: Abdominal pain   Physical Examination:  GENERAL:  Pleasant, elderly gentleman in no acute distress. He is alert and oriented. VITALS:  BP: initially 168/66 initially, 140/60 repeated by myself. P: 60.  O2 sat: 95.  T: 97.8. HEENT:  Normocephalic, atraumatic. NECK:  Supple without adenopathy. CHEST:  Lungs clear to auscultation, no rales or wheezes. CV:  Heart regular rhythm without murmur. EXTREMITIES:  1+ pitting edema. No calf tenderness. Distal pulses were present. NEURO:  He does ambulate with a walker. Impression:  1. Hypertension, stable. 2. Hyperlipidemia. 3. Controlled type 2 DM. Plan:  1. While in the office it was opted to repeat his basic lab studies and I will call him as soon as I have his results. Otherwise he will follow up in three months with his physician in [place].

## 2019-08-26 NOTE — PROGRESS NOTES
Lab results discussed with patient. K  5.9. Needs repeat K. HGB down 9.7. Increase Iron to twice daily. Follow up lab in one week.

## 2019-08-28 ENCOUNTER — APPOINTMENT (OUTPATIENT)
Dept: INTERNAL MEDICINE CLINIC | Age: 84
End: 2019-08-28

## 2019-08-28 DIAGNOSIS — D64.9 ANEMIA, UNSPECIFIED TYPE: ICD-10-CM

## 2019-08-28 DIAGNOSIS — I10 ESSENTIAL HYPERTENSION, BENIGN: ICD-10-CM

## 2019-08-28 LAB
ANION GAP SERPL CALC-SCNC: 16 MMOL/L
BUN SERPL-MCNC: 42 MG/DL (ref 9–20)
CALCIUM SERPL-MCNC: 9.2 MG/DL (ref 8.4–10.2)
CHLORIDE SERPL-SCNC: 104 MMOL/L (ref 98–107)
CO2 SERPL-SCNC: 26 MMOL/L (ref 22–32)
CREAT SERPL-MCNC: 0.7 MG/DL (ref 0.8–1.5)
GLUCOSE SERPL-MCNC: 108 MG/DL (ref 75–110)
POTASSIUM SERPL-SCNC: 4.7 MMOL/L (ref 3.6–5)
SODIUM SERPL-SCNC: 146 MMOL/L (ref 137–145)

## 2019-08-29 LAB
BASOPHILS # BLD AUTO: 0 X10E3/UL (ref 0–0.2)
BASOPHILS NFR BLD AUTO: 0 %
EOSINOPHIL # BLD AUTO: 0.6 X10E3/UL (ref 0–0.4)
EOSINOPHIL NFR BLD AUTO: 8 %
ERYTHROCYTE [DISTWIDTH] IN BLOOD BY AUTOMATED COUNT: 17.1 % (ref 12.3–15.4)
HCT VFR BLD AUTO: 34.3 % (ref 37.5–51)
HGB BLD-MCNC: 10.8 G/DL (ref 13–17.7)
IMM GRANULOCYTES # BLD AUTO: 0 X10E3/UL (ref 0–0.1)
IMM GRANULOCYTES NFR BLD AUTO: 0 %
LYMPHOCYTES # BLD AUTO: 1.6 X10E3/UL (ref 0.7–3.1)
LYMPHOCYTES NFR BLD AUTO: 22 %
MCH RBC QN AUTO: 28 PG (ref 26.6–33)
MCHC RBC AUTO-ENTMCNC: 31.5 G/DL (ref 31.5–35.7)
MCV RBC AUTO: 89 FL (ref 79–97)
MONOCYTES # BLD AUTO: 0.7 X10E3/UL (ref 0.1–0.9)
MONOCYTES NFR BLD AUTO: 9 %
NEUTROPHILS # BLD AUTO: 4.5 X10E3/UL (ref 1.4–7)
NEUTROPHILS NFR BLD AUTO: 61 %
PLATELET # BLD AUTO: 194 X10E3/UL (ref 150–450)
RBC # BLD AUTO: 3.86 X10E6/UL (ref 4.14–5.8)
WBC # BLD AUTO: 7.3 X10E3/UL (ref 3.4–10.8)

## 2019-09-16 RX ORDER — METFORMIN HYDROCHLORIDE 500 MG/1
TABLET, EXTENDED RELEASE ORAL
Qty: 360 TAB | Refills: 3 | Status: SHIPPED | OUTPATIENT
Start: 2019-09-16

## 2019-09-16 NOTE — TELEPHONE ENCOUNTER
Requested Prescriptions     Pending Prescriptions Disp Refills    metFORMIN ER (GLUCOPHAGE XR) 500 mg tablet 360 Tab 3     Sig: TAKE 2 TABLETS TWICE A DAY

## 2019-09-16 NOTE — TELEPHONE ENCOUNTER
PCP: Harleen Daniel NP    Last appt: 8/28/2019  Future Appointments   Date Time Provider [place]   10/2/2019  9:30 AM Jacqualine Galeazzi, MD [facility]       Requested Prescriptions     Pending Prescriptions Disp Refills    metFORMIN ER (GLUCOPHAGE XR) 500 mg tablet 360 Tab 3     Sig: TAKE 2 TABLETS TWICE A DAY       Prior labs and Blood pressures:  BP Readings from Last 3 Encounters:   08/21/19 140/60   07/08/19 119/66   04/08/19 132/64     Lab Results   Component Value Date/Time    Sodium 146 (H) 08/28/2019 02:15 PM    Potassium 4.7 08/28/2019 02:15 PM    Chloride 104 08/28/2019 02:15 PM    CO2 26.0 08/28/2019 02:15 PM    Anion gap 16 08/28/2019 02:15 PM    Glucose 108 08/28/2019 02:15 PM    BUN 42.0 (H) 08/28/2019 02:15 PM    Creatinine 0.7 (L) 08/28/2019 02:15 PM    BUN/Creatinine ratio 64 07/08/2019 11:59 AM    GFR est AA >60 08/28/2019 02:15 PM    GFR est non-AA >60 08/28/2019 02:15 PM    Calcium 9.2 08/28/2019 02:15 PM     Lab Results   Component Value Date/Time    Hemoglobin A1c 6.0 (H) 07/08/2019 11:58 AM    Hemoglobin A1c (POC) 6.5 (A) 01/08/2019 10:37 AM     Lab Results   Component Value Date/Time    Cholesterol, total 114 07/08/2019 11:59 AM    Cholesterol (POC) 111.0 01/08/2019 10:37 AM    HDL Cholesterol 27 (L) 07/08/2019 11:59 AM    HDL Cholesterol (POC) 32.0 (A) 01/08/2019 10:37 AM    LDL Cholesterol (POC) 54.4 01/08/2019 10:37 AM    LDL, calculated 50 07/08/2019 11:59 AM    VLDL, calculated 25 07/03/2018 09:34 AM    VLDL 37 07/08/2019 11:59 AM    Triglyceride 183 07/08/2019 11:59 AM    Triglycerides (POC) 123.0 01/08/2019 10:37 AM    CHOL/HDL Ratio 4 07/08/2019 11:59 AM     Lab Results   Component Value Date/Time    Vitamin D 25-Hydroxy 23.6 (L) 10/09/2015 01:16 PM       Lab Results   Component Value Date/Time    TSH 2.29 03/12/2015 06:05 PM

## 2019-10-02 ENCOUNTER — OFFICE VISIT (OUTPATIENT)
Dept: FAMILY MEDICINE CLINIC | Age: 84
End: 2019-10-02

## 2019-10-02 ENCOUNTER — HOSPITAL ENCOUNTER (OUTPATIENT)
Dept: LAB | Age: 84
Discharge: HOME | End: 2019-10-02
Payer: MEDICARE

## 2019-10-02 VITALS
OXYGEN SATURATION: 97 % | BODY MASS INDEX: 28.87 KG/M2 | TEMPERATURE: 98 F | DIASTOLIC BLOOD PRESSURE: 72 MMHG | SYSTOLIC BLOOD PRESSURE: 140 MMHG | HEART RATE: 61 BPM | HEIGHT: 71 IN | RESPIRATION RATE: 16 BRPM

## 2019-10-02 DIAGNOSIS — E87.5 HYPERKALEMIA: ICD-10-CM

## 2019-10-02 DIAGNOSIS — N18.2 CKD (CHRONIC KIDNEY DISEASE) STAGE 2, GFR 60-89 ML/MIN: ICD-10-CM

## 2019-10-02 DIAGNOSIS — D69.6 THROMBOCYTOPENIA (HCC): ICD-10-CM

## 2019-10-02 DIAGNOSIS — D64.9 CHRONIC ANEMIA: ICD-10-CM

## 2019-10-02 DIAGNOSIS — R15.9 INCONTINENCE OF FECES, UNSPECIFIED FECAL INCONTINENCE TYPE: ICD-10-CM

## 2019-10-02 DIAGNOSIS — R13.10 DYSPHAGIA, UNSPECIFIED TYPE: ICD-10-CM

## 2019-10-02 DIAGNOSIS — N32.81 OAB (OVERACTIVE BLADDER): ICD-10-CM

## 2019-10-02 DIAGNOSIS — J44.9 CHRONIC OBSTRUCTIVE PULMONARY DISEASE, UNSPECIFIED COPD TYPE (HCC): ICD-10-CM

## 2019-10-02 DIAGNOSIS — E11.9 WELL CONTROLLED DIABETES MELLITUS (HCC): ICD-10-CM

## 2019-10-02 DIAGNOSIS — M54.9 DISCOMFORT OF BACK: ICD-10-CM

## 2019-10-02 DIAGNOSIS — I10 ESSENTIAL HYPERTENSION: ICD-10-CM

## 2019-10-02 DIAGNOSIS — K59.00 CONSTIPATION, UNSPECIFIED CONSTIPATION TYPE: ICD-10-CM

## 2019-10-02 DIAGNOSIS — E55.9 VITAMIN D DEFICIENCY: ICD-10-CM

## 2019-10-02 DIAGNOSIS — Z23 ENCOUNTER FOR IMMUNIZATION: Primary | ICD-10-CM

## 2019-10-02 DIAGNOSIS — N40.0 BENIGN PROSTATIC HYPERPLASIA WITHOUT LOWER URINARY TRACT SYMPTOMS: ICD-10-CM

## 2019-10-02 LAB
ALBUMIN SERPL-MCNC: 3.9 G/DL (ref 3.4–5)
ALBUMIN/GLOB SERPL: 1 {RATIO} (ref 0.8–1.7)
ALP SERPL-CCNC: 92 U/L (ref 45–117)
ALT SERPL-CCNC: 24 U/L (ref 16–61)
ANION GAP SERPL CALC-SCNC: 6 MMOL/L (ref 3–18)
AST SERPL-CCNC: 19 U/L (ref 10–38)
BILIRUB SERPL-MCNC: 0.4 MG/DL (ref 0.2–1)
BUN SERPL-MCNC: 53 MG/DL (ref 7–18)
BUN/CREAT SERPL: 73 (ref 12–20)
CALCIUM SERPL-MCNC: 9.9 MG/DL (ref 8.5–10.1)
CHLORIDE SERPL-SCNC: 107 MMOL/L (ref 100–111)
CO2 SERPL-SCNC: 29 MMOL/L (ref 21–32)
CREAT SERPL-MCNC: 0.73 MG/DL (ref 0.6–1.3)
ERYTHROCYTE [DISTWIDTH] IN BLOOD BY AUTOMATED COUNT: 15.2 % (ref 11.6–14.5)
GLOBULIN SER CALC-MCNC: 3.8 G/DL (ref 2–4)
GLUCOSE SERPL-MCNC: 126 MG/DL (ref 74–99)
HCT VFR BLD AUTO: 37.1 % (ref 36–48)
HGB BLD-MCNC: 11.3 G/DL (ref 13–16)
IRON SATN MFR SERPL: 10 %
IRON SERPL-MCNC: 36 UG/DL (ref 50–175)
MCH RBC QN AUTO: 27.6 PG (ref 24–34)
MCHC RBC AUTO-ENTMCNC: 30.5 G/DL (ref 31–37)
MCV RBC AUTO: 90.5 FL (ref 74–97)
PLATELET # BLD AUTO: 195 K/UL (ref 135–420)
PMV BLD AUTO: 9.5 FL (ref 9.2–11.8)
POTASSIUM SERPL-SCNC: 4.9 MMOL/L (ref 3.5–5.5)
PROT SERPL-MCNC: 7.7 G/DL (ref 6.4–8.2)
RBC # BLD AUTO: 4.1 M/UL (ref 4.7–5.5)
SODIUM SERPL-SCNC: 142 MMOL/L (ref 136–145)
TIBC SERPL-MCNC: 365 UG/DL (ref 250–450)
TSH SERPL DL<=0.05 MIU/L-ACNC: 0.29 UIU/ML (ref 0.36–3.74)
WBC # BLD AUTO: 8.4 K/UL (ref 4.6–13.2)

## 2019-10-02 PROCEDURE — 83540 ASSAY OF IRON: CPT

## 2019-10-02 PROCEDURE — 85027 COMPLETE CBC AUTOMATED: CPT

## 2019-10-02 PROCEDURE — 84443 ASSAY THYROID STIM HORMONE: CPT

## 2019-10-02 PROCEDURE — 36415 COLL VENOUS BLD VENIPUNCTURE: CPT

## 2019-10-02 PROCEDURE — 80053 COMPREHEN METABOLIC PANEL: CPT

## 2019-10-02 RX ORDER — CLOTRIMAZOLE AND BETAMETHASONE DIPROPIONATE 10; .64 MG/G; MG/G
CREAM TOPICAL 2 TIMES DAILY
Qty: 15 G | Status: CANCELLED | OUTPATIENT
Start: 2019-10-02

## 2019-10-02 NOTE — PATIENT INSTRUCTIONS
Vaccine Information Statement Influenza (Flu) Vaccine (Inactivated or Recombinant): What You Need to Know Many Vaccine Information Statements are available in [place] and other languages. See www.immunize.org/vis Hojas de información sobre vacunas están disponibles en español y en muchos otros idiomas. Visite www.immunize.org/vis 1. Why get vaccinated? Influenza vaccine can prevent influenza (flu). Flu is a contagious disease that spreads around the [employer] every year, usually between October and May. Anyone can get the flu, but it is more dangerous for some people. Infants and young children, people 72years of age and older, pregnant women, and people with certain health conditions or a weakened immune system are at greatest risk of flu complications. Pneumonia, bronchitis, sinus infections and ear infections are examples of flu-related complications. If you have a medical condition, such as heart disease, cancer or diabetes, flu can make it worse. Flu can cause fever and chills, sore throat, muscle aches, fatigue, cough, headache, and runny or stuffy nose. Some people may have vomiting and diarrhea, though this is more common in children than adults. Each year thousands of people in the [facility] die from flu, and many more are hospitalized. Flu vaccine prevents millions of illnesses and flu-related visits to the doctor each year. 2. Influenza vaccines CDC recommends everyone 10months of age and older get vaccinated every flu season. Children 6 months through 6years of age may need 2 doses during a single flu season. Everyone else needs only 1 dose each flu season. It takes about 2 weeks for protection to develop after vaccination. There are many flu viruses, and they are always changing. Each year a new flu vaccine is made to protect against three or four viruses that are likely to cause disease in the upcoming flu season.  Even when the vaccine doesnt exactly match these viruses, it may still provide some protection. Influenza vaccine does not cause flu. Influenza vaccine may be given at the same time as other vaccines. 3. Talk with your health care provider Tell your vaccine provider if the person getting the vaccine: 
 Has had an allergic reaction after a previous dose of influenza vaccine, or has any severe, life-threatening allergies.  Has ever had Guillain-Barré Syndrome (also called GBS). In some cases, your health care provider may decide to postpone influenza vaccination to a future visit. People with minor illnesses, such as a cold, may be vaccinated. People who are moderately or severely ill should usually wait until they recover before getting influenza vaccine. Your health care provider can give you more information. 4. Risks of a reaction  Soreness, redness, and swelling where shot is given, fever, muscle aches, and headache can happen after influenza vaccine.  There may be a very small increased risk of Guillain-Barré Syndrome (GBS) after inactivated influenza vaccine (the flu shot). Irl Pae children who get the flu shot along with pneumococcal vaccine (PCV13), and/or DTaP vaccine at the same time might be slightly more likely to have a seizure caused by fever. Tell your health care provider if a child who is getting flu vaccine has ever had a seizure. People sometimes faint after medical procedures, including vaccination. Tell your provider if you feel dizzy or have vision changes or ringing in the ears. As with any medicine, there is a very remote chance of a vaccine causing a severe allergic reaction, other serious injury, or death. 5. What if there is a serious problem? An allergic reaction could occur after the vaccinated person leaves the clinic.  If you see signs of a severe allergic reaction (hives, swelling of the face and throat, difficulty breathing, a fast heartbeat, dizziness, or weakness), call 9-1-1 and get the person to the nearest hospital. 
 
For other signs that concern you, call your health care provider. Adverse reactions should be reported to the Vaccine Adverse Event Reporting System (VAERS). Your health care provider will usually file this report, or you can do it yourself. Visit the VAERS website at www.vaers. hhs.gov or call 3-925.240.2691. VAERS is only for reporting reactions, and VAERS staff do not give medical advice. 6. The National Vaccine Injury Compensation Program 
 
The [facility] Vaccine Injury Compensation Program (VICP) is a federal program that was created to compensate people who may have been injured by certain vaccines. Visit the VICP website at www.[facility].gov/vaccinecompensation or call 8-490.170.9581 to learn about the program and about filing a claim. There is a time limit to file a claim for compensation. 7. How can I learn more?  Ask your health care provider.  Call your local or state health department.  Contact the Centers for Disease Control and Prevention (CDC): 
- Call 9-204.379.7000 (1-800-CDC-INFO) or 
- Visit CDCs influenza website at www.cdc.gov/flu Vaccine Information Statement (Interim) Inactivated Influenza Vaccine 8/15/2019 
42 FRED Mauricio [id number] Department of Health and [employer] Centers for Disease Control and Prevention Office Use Only Nutrition Tips for Diabetes: After Your Visit Your Care Instructions A healthy diet is important to manage diabetes. It helps you lose weight (if you need to) and keep it off. It gives you the nutrition and energy your body needs and helps prevent heart disease. But a diet for diabetes does not mean that you have to eat special foods. You can eat what your family eats, including occasional sweets and other favorites.  But you do have to pay attention to how often you eat and how much you eat of certain foods. The right plan for you will give you meals that help you keep your blood sugar at healthy levels. Try to eat a variety of foods and to spread carbohydrate throughout the day. Carbohydrate raises blood sugar higher and more quickly than any other nutrient does. Carbohydrate is found in sugar, breads and cereals, fruit, starchy vegetables such as potatoes and corn, and milk and yogurt. You may want to work with a dietitian or diabetes educator to help you plan meals and snacks. A dietitian or diabetes educator also can help you lose weight if that is one of your goals. The following tips can help you enjoy your meals and stay healthy. Follow-up care is a key part of your treatment and safety. Be sure to make and go to all appointments, and call your doctor if you are having problems. Its also a good idea to know your test results and keep a list of the medicines you take. How can you care for yourself at home? · Learn which foods have carbohydrate and how much carbohydrate to eat. A dietitian or diabetes educator can help you learn to keep track of how much carbohydrate you eat. · Spread carbohydrate throughout the day. Eat some carbohydrate at all meals, but do not eat too much at any one time. · Plan meals to include food from all the food groups. These are the food groups and some example portion sizes: ¨ Grains: 1 slice of bread (1 ounce), ½ cup of cooked cereal, and 1/3 cup of cooked pasta or rice. These have about 15 grams of carbohydrate in a serving. Choose whole grains such as whole wheat bread or crackers, oatmeal, and brown rice more often than refined grains. ¨ Fruit: 1 small fresh fruit, such as an apple or orange; ½ of a banana; ½ cup of chopped, cooked, or canned fruit; ½ cup of fruit juice; 1 cup of melon or raspberries; and 2 tablespoons of dried fruit. These have about 15 grams of carbohydrate in a serving. ¨ Dairy: 1 cup of nonfat or low-fat milk and 2/3 cup of plain yogurt. These have about 15 grams of carbohydrate in a serving. ¨ Protein foods: Beef, chicken, turkey, fish, eggs, tofu, cheese, cottage cheese, and peanut butter. A serving size of meat is 3 ounces, which is about the size of a deck of cards. Examples of meat substitute serving sizes (equal to 1 ounce of meat) are 1/4 cup of cottage cheese, 1 egg, 1 tablespoon of peanut butter, and ½ cup of tofu. These have very little or no carbohydrate per serving. ¨ Vegetables: Starchy vegetables such as ½ cup of cooked dried beans, peas, potatoes, or corn have about 15 grams of carbohydrate. Nonstarchy vegetables have very little carbohydrate, such as 1 cup of raw leafy vegetables (such as spinach), ½ cup of other vegetables (cooked or chopped), and 3/4 cup of vegetable juice. · Use the plate format to plan meals. It is a good, quick way to make sure that you have a balanced meal. It also helps you spread carbohydrate throughout the day. You divide your plate by types of foods. Put vegetables on half the plate, meat or meat substitutes on one-quarter of the plate, and a grain or starchy vegetable (such as brown rice or a potato) in the final quarter of the plate. To this you can add a small piece of fruit and 1 cup of milk or yogurt, depending on how much carbohydrate you are supposed to eat at a meal. 
· Talk to your dietitian or diabetes educator about ways to add limited amounts of sweets into your meal plan. You can eat these foods now and then, as long as you include the amount of carbohydrate they have in your daily carbohydrate allowance. · If you drink alcohol, limit it to no more than 1 drink a day for women and 2 drinks a day for men. If you are pregnant, no amount of alcohol is known to be safe. · Protein, fat, and fiber do not raise blood sugar as much as carbohydrate does. If you eat a lot of these nutrients in a meal, your blood sugar will rise more slowly than it would otherwise. · Limit saturated fats, such as those from meat and dairy products. Try to replace it with monounsaturated fat, such as olive oil. This is a healthier choice because people who have diabetes are at higher-than-average risk of heart disease. But use a modest amount of olive oil. A tablespoon of olive oil has 14 grams of fat and 120 calories. · Exercise lowers blood sugar. If you take insulin by shots or pump, you can use less than you would if you were not exercising. Keep in mind that timing matters. If you exercise within 1 hour after a meal, your body may need less insulin for that meal than it would if you exercised 3 hours after the meal. Test your blood sugar to find out how exercise affects your need for insulin. · Exercise on most days of the week. Aim for at least 30 minutes. Exercise helps you stay at a healthy weight and helps your body use insulin. Walking is an easy way to get exercise. Gradually increase the amount you walk every day. You also may want to swim, bike, or do other activities. When you eat out · Learn to estimate the serving sizes of foods that have carbohydrate. If you measure food at home, it will be easier to estimate the amount in a serving of restaurant food. · If the meal you order has too much carbohydrate (such as potatoes, corn, or baked beans), ask to have a low-carbohydrate food instead. Ask for a salad or green vegetables. · If you use insulin, check your blood sugar before and after eating out to help you plan how much to eat in the future. · If you eat more carbohydrate at a meal than you had planned, take a walk or do other exercise. This will help lower your blood sugar. Where can you learn more? Go to [employer] Enter E799 in the search box to learn more about \"Nutrition Tips for Diabetes: After Your Visit. \"  
© 0179-3493 [employer].  Care instructions adapted under license by [employer] (which disclaims liability or warranty for this information). This care instruction is for use with your licensed healthcare professional. If you have questions about a medical condition or this instruction, always ask your healthcare professional. [address] any warranty or liability for your use of this information. Content Version: 11.0.118901; Current as of: June 4, 2014 Low Sodium Diet (2,000 Milligram): Care Instructions Your Care Instructions Too much sodium causes your body to hold on to extra water. This can raise your blood pressure and force your heart and kidneys to work harder. In very serious cases, this could cause you to be put in the hospital. It might even be life-threatening. By limiting sodium, you will feel better and lower your risk of serious problems. The most common source of sodium is salt. People get most of the salt in their diet from canned, prepared, and packaged foods. Fast food and restaurant meals also are very high in sodium. Your doctor will probably limit your sodium to less than 2,000 milligrams (mg) a day. This limit counts all the sodium in prepared and packaged foods and any salt you add to your food. Follow-up care is a key part of your treatment and safety. Be sure to make and go to all appointments, and call your doctor if you are having problems. It's also a good idea to know your test results and keep a list of the medicines you take. How can you care for yourself at home? Read food labels · Read labels on cans and food packages. The labels tell you how much sodium is in each serving. Make sure that you look at the serving size. If you eat more than the serving size, you have eaten more sodium. · Food labels also tell you the Percent Daily Value for sodium. Choose products with low Percent Daily Values for sodium.  
· Be aware that sodium can come in forms other than salt, including monosodium glutamate (MSG), sodium citrate, and sodium bicarbonate (baking soda). MSG is often added to Asian food. When you eat out, you can sometimes ask for food without MSG or added salt. Buy low-sodium foods · Buy foods that are labeled \"unsalted\" (no salt added), \"sodium-free\" (less than 5 mg of sodium per serving), or \"low-sodium\" (less than 140 mg of sodium per serving). Foods labeled \"reduced-sodium\" and \"light sodium\" may still have too much sodium. Be sure to read the label to see how much sodium you are getting. · Buy fresh vegetables, or frozen vegetables without added sauces. Buy low-sodium versions of canned vegetables, soups, and other canned goods. Prepare low-sodium meals · Cut back on the amount of salt you use in cooking. This will help you adjust to the taste. Do not add salt after cooking. One teaspoon of salt has about 2,300 mg of sodium. · Take the salt shaker off the table. · Flavor your food with garlic, lemon juice, onion, vinegar, herbs, and spices. Do not use soy sauce, lite soy sauce, steak sauce, onion salt, garlic salt, celery salt, mustard, or ketchup on your food. · Use low-sodium salad dressings, sauces, and ketchup. Or make your own salad dressings and sauces without adding salt. · Use less salt (or none) when recipes call for it. You can often use half the salt a recipe calls for without losing flavor. Other foods such as rice, pasta, and grains do not need added salt. · Rinse canned vegetables, and cook them in fresh water. This removes somebut not allof the salt. · Avoid water that is naturally high in sodium or that has been treated with water softeners, which add sodium. Call your local water company to find out the sodium content of your water supply. If you buy bottled water, read the label and choose a sodium-free brand. Avoid high-sodium foods · Avoid eating: 
? Smoked, cured, salted, and canned meat, fish, and poultry. ? Ham, reddy, hot dogs, and luncheon meats. ? Regular, hard, and processed cheese and regular peanut butter. ? Crackers with salted tops, and other salted snack foods such as pretzels, chips, and salted popcorn. ? Frozen prepared meals, unless labeled low-sodium. ? Canned and dried soups, broths, and bouillon, unless labeled sodium-free or low-sodium. ? Canned vegetables, unless labeled sodium-free or low-sodium. ? Western Genia fries, pizza, tacos, and other fast foods. ? Pickles, olives, ketchup, and other condiments, especially soy sauce, unless labeled sodium-free or low-sodium. Where can you learn more? Go to http://julio cséar-precious.info/. Enter E408 in the search box to learn more about \"Low Sodium Diet (2,000 Milligram): Care Instructions. \" Current as of: November 7, 2018 Content Version: 12.2 © 0897-4187 [employer]. Care instructions adapted under license by [employer] (which disclaims liability or warranty for this information). If you have questions about a medical condition or this instruction, always ask your healthcare professional. [place] [address] any warranty or liability for your use of this information. Benign Prostatic Hyperplasia: Care Instructions Your Care Instructions Benign prostatic hyperplasia, or BPH, is an enlarged prostate gland. The prostate is a small gland that makes some of the fluid in semen. Prostate enlargement happens to almost all men as they age. It is usually not serious. BPH does not cause prostate cancer. As the prostate gets bigger, it may partly block the flow of urine. You may have a hard time getting a urine stream started or completely stopped. BPH can cause dribbling. You may have a weak urine stream, or you may have to urinate more often than you used to, especially at night. Most men find these problems easy to manage.  
You do not need treatment unless your symptoms bother you a lot or you have other problems, such as bladder infections or stones. In these cases, medicines may help. Surgery is not needed unless the urine flow is blocked or the symptoms do not get better with medicine. Follow-up care is a key part of your treatment and safety. Be sure to make and go to all appointments, and call your doctor if you are having problems. It's also a good idea to know your test results and keep a list of the medicines you take. How can you care for yourself at home? · Take plenty of time to urinate. Try to relax. · Try \"double voiding. \" Urinate as much you can, relax for a few moments, and then try to urinate again. · Sit on the toilet to urinate. · Read or think of other things while you are waiting. · Turn on a faucet, or try to picture running water. Some men find that this helps get their urine flowing. · If dribbling is a problem, wash your penis daily to avoid skin irritation and infection. · Avoid caffeine and alcohol. These drinks will increase how often you need to urinate. Spread your fluid intake throughout the day. If the urge to urinate often wakes you at night, limit your fluid intake in the evening. Urinate right before you go to bed. · Many over-the-counter cold and allergy medicines can make the symptoms of BPH worse. Avoid antihistamines, decongestants, and allergy pills, if you can. Read the warnings on the package. · If you take any prescription medicines, especially tranquilizers or antidepressants, ask your doctor or pharmacist whether they can cause urination problems. There may be other medicines you can use that do not cause urinary problems. · Be safe with medicines. Take your medicines exactly as prescribed. Call your doctor if you think you are having a problem with your medicine. When should you call for help? Call your doctor now or seek immediate medical care if: 
  · You cannot urinate at all.  
  · You have symptoms of a urinary infection. For example: ? You have blood or pus in your urine. ? You have pain in your back just below your rib cage. This is called flank pain. ? You have a fever, chills, or body aches. ? It hurts to urinate. ? You have groin or belly pain.  
 Watch closely for changes in your health, and be sure to contact your doctor if: 
  · It hurts when you ejaculate.  
  · Your urinary problems get a lot worse or bother you a lot. Where can you learn more? Go to http://julio césar-precious.info/. Enter X911 in the search box to learn more about \"Benign Prostatic Hyperplasia: Care Instructions. \" Current as of: May 28, 2019 Content Version: 12.2 © 8144-8299 [employer]. Care instructions adapted under license by [employer] (which disclaims liability or warranty for this information). If you have questions about a medical condition or this instruction, always ask your healthcare professional. [place] [address] any warranty or liability for your use of this information. Anemia: Care Instructions Your Care Instructions Anemia is a low level of red blood cells, which carry oxygen throughout your body. Many things can cause anemia. Lack of iron is one of the most common causes. Your body needs iron to make hemoglobin, a substance in red blood cells that carries oxygen from the lungs to your body's cells. Without enough iron, the body produces fewer and smaller red blood cells. As a result, your body's cells do not get enough oxygen, and you feel tired and weak. And you may have trouble concentrating. Bleeding is the most common cause of a lack of iron. You may have heavy menstrual bleeding or bleeding caused by conditions such as ulcers, hemorrhoids, or cancer. Regular use of aspirin or other anti-inflammatory medicines (such as ibuprofen) also can cause bleeding in some people.  A lack of iron in your diet also can cause anemia, especially at times when the body needs more iron, such as during pregnancy, infancy, and the teen years. Your doctor may have prescribed iron pills. It may take several months of treatment for your iron levels to return to normal. Your doctor also may suggest that you eat foods that are rich in iron, such as meat and beans. There are many other causes of anemia. It is not always due to a lack of iron. Finding the specific cause of your anemia will help your doctor find the right treatment for you. Follow-up care is a key part of your treatment and safety. Be sure to make and go to all appointments, and call your doctor if you are having problems. It's also a good idea to know your test results and keep a list of the medicines you take. How can you care for yourself at home? · Take your medicines exactly as prescribed. Call your doctor if you think you are having a problem with your medicine. · If your doctor recommends iron pills, take them as directed: ? Try to take the pills on an empty stomach about 1 hour before or 2 hours after meals. But you may need to take iron with food to avoid an upset stomach. ? Do not take antacids or drink milk or caffeine drinks (such as coffee, tea, or cola) at the same time or within 2 hours of the time that you take your iron. They can make it hard for your body to absorb the iron. ? Vitamin C (from food or supplements) helps your body absorb iron. Try taking iron pills with a glass of orange juice or some other food that is high in vitamin C, such as citrus fruits. ? Iron pills may cause stomach problems, such as heartburn, nausea, diarrhea, constipation, and cramps. Be sure to drink plenty of fluids, and include fruits, vegetables, and fiber in your diet each day. Iron pills often make your bowel movements dark or green. ? If you forget to take an iron pill, do not take a double dose of iron the next time you take a pill. ? Keep iron pills out of the reach of small children. An overdose of iron can be very dangerous. · Follow your doctor's advice about eating iron-rich foods. These include red meat, shellfish, poultry, eggs, beans, raisins, whole-grain bread, and leafy green vegetables. · Steam vegetables to help them keep their iron content. When should you call for help? Call 911 anytime you think you may need emergency care. For example, call if: 
  · You have symptoms of a heart attack. These may include: 
? Chest pain or pressure, or a strange feeling in the chest. 
? Sweating. ? Shortness of breath. ? Nausea or vomiting. ? Pain, pressure, or a strange feeling in the back, neck, jaw, or upper belly or in one or both shoulders or arms. ? Lightheadedness or sudden weakness. ? A fast or irregular heartbeat. After you call 911, the  may tell you to chew 1 adult-strength or 2 to 4 low-dose aspirin. Wait for an ambulance. Do not try to drive yourself.  
  · You passed out (lost consciousness).  
 Call your doctor now or seek immediate medical care if: 
  · You have new or increased shortness of breath.  
  · You are dizzy or lightheaded, or you feel like you may faint.  
  · Your fatigue and weakness continue or get worse.  
  · You have any abnormal bleeding, such as: 
? Nosebleeds. ? Vaginal bleeding that is different (heavier, more frequent, at a different time of the month) than what you are used to. 
? Bloody or black stools, or rectal bleeding. ? Bloody or pink urine.  
 Watch closely for changes in your health, and be sure to contact your doctor if: 
  · You do not get better as expected. Where can you learn more? Go to http://julio césar-precious.info/. Enter R301 in the search box to learn more about \"Anemia: Care Instructions. \" Current as of: March 28, 2019 Content Version: 12.2 © 1644-4919 [employer].  Care instructions adapted under license by [facility] [address] (which disclaims liability or warranty for this information). If you have questions about a medical condition or this instruction, always ask your healthcare professional. [address] any warranty or liability for your use of this information. Iron-Rich Diet: Care Instructions Your Care Instructions Your body needs iron to make hemoglobin. Hemoglobin is a substance in red blood cells that carries oxygen from the lungs to cells all through your body. If you do not get enough iron, your body makes fewer and smaller red blood cells. As a result, your body's cells may not get enough oxygen. Adult men need 8 milligrams of iron a day; adult women need 18 milligrams of iron a day. After menopause, women need 8 milligrams of iron a day. A pregnant woman needs 27 milligrams of iron a day. Infants and young children have higher iron needs relative to their size than other age groups. People who have lost blood because of ulcers or heavy menstrual periods may become very low in iron and may develop anemia. Most people can get the iron their bodies need by eating enough of certain iron-rich foods. Your doctor may recommend that you take an iron supplement along with eating an iron-rich diet. Follow-up care is a key part of your treatment and safety. Be sure to make and go to all appointments, and call your doctor if you are having problems. It's also a good idea to know your test results and keep a list of the medicines you take. How can you care for yourself at home? · Make iron-rich foods a part of your daily diet. Iron-rich foods include: ? All meats, such as chicken, beef, lamb, pork, fish, and shellfish. Liver is especially high in iron. ? Leafy green vegetables. ? Raisins, peas, beans, lentils, barley, and eggs. ? Iron-fortified breakfast cereals. · Eat foods with vitamin C along with iron-rich foods.  Vitamin C helps you absorb more iron from food. Drink a glass of orange juice or another citrus juice with your food. · Eat meat and vegetables or grains together. The iron in meat helps your body absorb the iron in other foods. Where can you learn more? Go to http://julio césar-precious.info/. Enter [id number] [id number] in the search box to learn more about \"Iron-Rich Diet: Care Instructions. \" Current as of: November 7, 2018 Content Version: 12.2 © 8343-5903 [employer]. Care instructions adapted under license by [employer] (which disclaims liability or warranty for this information). If you have questions about a medical condition or this instruction, always ask your healthcare professional. [address] any warranty or liability for your use of this information.

## 2019-10-02 NOTE — PROGRESS NOTES
1. Have you been to the ER, urgent care clinic since your last visit? Hospitalized since your last visit? 6/2019 [facility]     2. Have you seen or consulted any other health care providers outside of the [address] [place] [address] since your last visit? Include any pap smears or colon screening. No    Chief Complaint   Patient presents with    New Patient    Fall     fall on 9/18/19 - fell off porch and hit back of head    Medication Refill     anal leakage - requests prescription for Lotrisone         Patient requests flu vaccine today.

## 2019-10-02 NOTE — PROGRESS NOTES
Fluad 0.5 ml given IM in left deltoid. Lot # [id number], exp date 06/30/2020. Patient tolerated injection well. No adverse reaction noted.

## 2019-10-02 NOTE — PROGRESS NOTES
HISTORY OF PRESENT ILLNESS  Duke Taylor is a 82 y.o. male. HPI: new patient. Multiple medical problem. Prior PCP retiered. H/o diabetes. Well controlled. Last HBA1C at goal. Noted on metformin. GFR stable. Also h/o CKD stage 2. Noted from the chart h/o copd. He denies using any maintenance inhaler or rescue inhaler. No cough or cold. No wheezing. No sob. Former smoker. Hugo Chaudhari he is up to date with colonoscopy and had EGD done for esophageal dilatation. Noted in the chart EGD report but not colonoscopy report. Nurse to advise to obtain records from GI specialist.   Also currently concern with rectal leakage when trying to get up from wheel chair or chair or any strainer activity. No rectal pain or rash. Mentioned that does not have bowel movement every day. Taking daily ciarra lax as well. No blood in stool. No abdominal pain. No nausea or vomiting. No appetite or weight changes. No fever. H/o BPH. Been following urology. He will provide their contact info. Currently no urinary complains. H/o hypertension. Mild elevated blood pressure. Asymptomatic. ? Coming to new doctor. Will observe. Taking mediation with compliance. No side effects. Noted h/o hypernatremia, hyperkalemia. Noted recommended adjusting lisinopril dose but he is till taking 40 mg. Will repeat labs. Also noted h/o thrombocytopenia. On plavix and aspirin. H/o bilateral carotid artery disease. Recently had procedure done over rt side. Few years back had done left side. Will obtain records. Said recent fall on his porch hurt his back of the head. No bruise or swelling. Said he fell on his rt elbow and back of the head. No injury anywhere else. I do not see any open skin. No bruise over back of the head. He denies any headache or dizziness. No ext weakness, no vision changes. Will observe. He is walking with his walker at home to prevent fall and encouraged him strongly to do so. Lives with his family.    Visit Vitals  /72 (BP 1 Location: Left arm, BP Patient Position: Sitting)   Pulse 61   Temp 98 °F (36.7 °C) (Oral)   Resp 16   Ht 5' 11\" (1.803 m)   SpO2 97%   BMI 28.87 kg/m²     Review medication list, vitals, problem list,allergies. Review labs   Lab Results   Component Value Date/Time    WBC 8.4 10/02/2019 12:20 PM    Hemoglobin (POC) 16.7 06/26/2012 08:28 AM    HGB (POC) 11.8 (A) 01/08/2019 10:37 AM    HGB 11.3 (L) 10/02/2019 12:20 PM    Hematocrit (POC) 49 06/26/2012 08:28 AM    HCT (POC) 35.5 (A) 01/08/2019 10:37 AM    HCT 37.1 10/02/2019 12:20 PM    PLATELET 508 13/91/6642 12:20 PM    MCV 90.5 10/02/2019 12:20 PM     Lab Results   Component Value Date/Time    Sodium 142 10/02/2019 12:20 PM    Potassium 4.9 10/02/2019 12:20 PM    Chloride 107 10/02/2019 12:20 PM    CO2 29 10/02/2019 12:20 PM    Anion gap 6 10/02/2019 12:20 PM    Glucose 126 (H) 10/02/2019 12:20 PM    BUN 53 (H) 10/02/2019 12:20 PM    Creatinine 0.73 10/02/2019 12:20 PM    BUN/Creatinine ratio 73 (H) 10/02/2019 12:20 PM    GFR est AA >60 10/02/2019 12:20 PM    GFR est non-AA >60 10/02/2019 12:20 PM    Calcium 9.9 10/02/2019 12:20 PM    Bilirubin, total 0.4 10/02/2019 12:20 PM    AST (SGOT) 19 10/02/2019 12:20 PM    Alk.  phosphatase 92 10/02/2019 12:20 PM    Protein, total 7.7 10/02/2019 12:20 PM    Albumin 3.9 10/02/2019 12:20 PM    Globulin 3.8 10/02/2019 12:20 PM    A-G Ratio 1.0 10/02/2019 12:20 PM    ALT (SGPT) 24 10/02/2019 12:20 PM     Lab Results   Component Value Date/Time    Cholesterol, total 114 07/08/2019 11:59 AM    Cholesterol (POC) 111.0 01/08/2019 10:37 AM    HDL Cholesterol 27 (L) 07/08/2019 11:59 AM    HDL Cholesterol (POC) 32.0 (A) 01/08/2019 10:37 AM    LDL Cholesterol (POC) 54.4 01/08/2019 10:37 AM    LDL, calculated 50 07/08/2019 11:59 AM    VLDL, calculated 25 07/03/2018 09:34 AM    VLDL 37 07/08/2019 11:59 AM    Triglyceride 183 07/08/2019 11:59 AM    Triglycerides (POC) 123.0 01/08/2019 10:37 AM    CHOL/HDL Ratio 4 07/08/2019 11:59 AM Lab Results   Component Value Date/Time    TSH 0.29 (L) 10/02/2019 12:20 PM     Lab Results   Component Value Date/Time    Hemoglobin A1c 6.0 (H) 07/08/2019 11:58 AM    Hemoglobin A1c (POC) 6.5 (A) 01/08/2019 10:37 AM     Lab Results   Component Value Date/Time    Microalbumin urine (POC) 20 01/08/2019 10:02 AM           ROS\": see HPI     Physical Exam   Constitutional: He is oriented to person, place, and time. No distress. Cardiovascular: Normal heart sounds. Pulmonary/Chest: No respiratory distress. He has no wheezes. Abdominal: Soft. There is no tenderness. Musculoskeletal: He exhibits no edema. Neurological: He is oriented to person, place, and time. Grossly intact    Psychiatric: His behavior is normal.       ASSESSMENT and PLAN    ICD-10-CM ICD-9-CM    1. Encounter for immunization Z23 V03.89 INFLUENZA VACCINE INACTIVATED (IIV), SUBUNIT, ADJUVANTED, IM      ADMIN INFLUENZA VIRUS VAC   2. Well controlled diabetes mellitus [facility]): for now observe and continue current plan. Diet modification. E11.9 250.00 TSH 3RD GENERATION   3. CKD (chronic kidney disease) stage 2, GFR 60-89 ml/min: observe. Repeat labs. P45.2 325.4 METABOLIC PANEL, COMPREHENSIVE   4. Chronic obstructive pulmonary disease, unspecified COPD type ([facility] [address] asymptomatic. Not on any medication. Will observe. J44.9 496     former smoker    5. Vitamin D deficiency: taking multivitamin. E55.9 268.9    6. OAB (overactive bladder): stable. On flomax. Helping symptoms. H/o BPH.  N95.99 671.90 METABOLIC PANEL, COMPREHENSIVE   7. Thrombocytopenia ([facility] [address]): recheck labs. D69.6 287.5 CBC W/O DIFF   8. Hyperkalemia: suppose to lower the lisinopril dose per prior messages. He is still taking same. Will repeat labs. A79.8 000.3 METABOLIC PANEL, COMPREHENSIVE   9. Chronic anemia: checking labs. Not on any supplement. Had colonoscopy done in the past. Will obtain records. D64.9 285.9 CBC W/O DIFF      IRON PROFILE   10.  Benign prostatic hyperplasia without lower urinary tract symptoms N40.0 600.00    11. Dysphagia, unspecified type R13.10 787.20     post dilatation in 2017. no concern at this time. Dr. Valencia Boykin . EGD in the chart    12. Constipation, unspecified constipation type: metamucil otc. Also rectal leakage with straineous  activity. K59.00 564.00 REFERRAL TO COLON AND RECTAL SURGERY   13. Incontinence of feces, unspecified fecal incontinence type R15.9 787.60 REFERRAL TO COLON AND RECTAL SURGERY    leakage of watery stool with straneous activity. 14. Essential hypertension: well controlled. Continue current dose of medication and low salt diet. Exercise as tolerated. I10 401.9    15. Discomfort of back: stable at this time. M54.9 724.5     chronic . gives trouble getting up. Pt understood and agree with the plan   Review hM   Follow-up and Dispositions    · Return in about 1 month (around 11/2/2019).

## 2019-10-03 NOTE — PROGRESS NOTES
Let or know that lab showed anemia. Also low tsh. That means thyroid gland is working more than it suppose to. For now need to send to endocrinology who are thyroid specialist as well. Will do a referral tomorrow when in the office.  For now multivitamin daily

## 2019-10-04 DIAGNOSIS — R79.89 LOW TSH LEVEL: Primary | ICD-10-CM

## 2019-10-04 NOTE — PROGRESS NOTES
Also noted anemia. Take multivitamin for now. Further discussion on follow up visit. See below note as well.

## 2019-10-09 NOTE — TELEPHONE ENCOUNTER
This pharmacy faxed over request for the following prescriptions to be filled:    Medication requested :   Requested Prescriptions     Pending Prescriptions Disp Refills    omega-3 acid ethyl esters (LOVAZA) 1 gram capsule       Sig: Take 2 Caps by mouth two (2) times a day.     atorvastatin (LIPITOR) 40 mg tablet 90 Tab 3     PCP: [address] or Print: [facility]  Mail order or Local pharmacy Mail Order     Scheduled appointment if not seen by current providers in office: LOB 10/2/2019 f/u 11/15/2019

## 2019-10-10 RX ORDER — OMEGA-3-ACID ETHYL ESTERS 1 G/1
2 CAPSULE, LIQUID FILLED ORAL 2 TIMES DAILY
Qty: 180 CAP | Refills: 1 | Status: SHIPPED | OUTPATIENT
Start: 2019-10-10 | End: 2019-11-15 | Stop reason: SDUPTHER

## 2019-10-10 RX ORDER — ATORVASTATIN CALCIUM 40 MG/1
40 TABLET, FILM COATED ORAL
Qty: 90 TAB | Refills: 1 | Status: SHIPPED | OUTPATIENT
Start: 2019-10-10 | End: 2020-03-11 | Stop reason: SDUPTHER

## 2019-10-21 ENCOUNTER — OFFICE VISIT (OUTPATIENT)
Dept: SURGERY | Age: 84
End: 2019-10-21

## 2019-10-21 VITALS
WEIGHT: 208 LBS | TEMPERATURE: 97 F | DIASTOLIC BLOOD PRESSURE: 61 MMHG | RESPIRATION RATE: 18 BRPM | HEART RATE: 71 BPM | HEIGHT: 71 IN | BODY MASS INDEX: 29.12 KG/M2 | OXYGEN SATURATION: 100 % | SYSTOLIC BLOOD PRESSURE: 139 MMHG

## 2019-10-21 DIAGNOSIS — R15.9 FULL INCONTINENCE OF FECES: Primary | ICD-10-CM

## 2019-10-21 NOTE — PROGRESS NOTES
HPI: Remedios Banks is a 82 y.o. male presenting with chief complain of fecal leakage. This happens every 2 or 3 days. It is always liquid. It is been going on for the last 3 years. He denies abdominal complaints or unexplained weight loss. He normally moves his bowels every day to every other day. He is on a generic Metamucil. He denies blood per rectum except for on the toilet paper when wiping. He has no family history of colorectal cancer. His last colonoscopy was in 2014 and was negative with recommendation for no further screening.     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    Chronic kidney disease     Chronic pain     low back    CKD (chronic kidney disease) stage 2, GFR 60-89 ml/min     Class 1 obesity due to excess calories with serious comorbidity in adult 12/31/2017    Colon polyp 7/26/2017    Onset Date: 11/2001 Comments: H/O    Compression fracture of L2 lumbar vertebra ([address] 7/26/2017    Problem onset is 1985 Story: L2     COPD (chronic obstructive pulmonary disease) ([address]     DDD (degenerative disc disease)     chronic back pain    Diabetes ([address]     borderline    DJD (degenerative joint disease)     DVT (deep venous thrombosis) ([facility] [address]     1998    Dysphagia 7/26/2017    ED (erectile dysfunction) 7/26/2017    Edema extremities 7/26/2017    Fatty liver     Fecal soiling 7/26/2017    Frequent falls     GERD (gastroesophageal reflux disease)     H/O adenomatous polyp of colon     Hearing loss 7/26/2017    Story: wears bilat hearing aids     Heart valve disorder 7/26/2017    Story: mild MR/AI    Hiatal hernia 7/26/2017    Hypercholesterolemia     Hypertension     Incomplete right bundle branch block 2017    MR (mitral regurgitation)     Overactive bladder     Proteinuria 2017    Problem onset is      PUD (peptic ulcer disease)     in late teens   [facility] Right shoulder pain 2017    Risk for falls 2017    Skin cancer     Tardy palsy of left ulnar nerve 2017    Thrombocytopenia (HCC)     Trigger middle finger of left hand     Umbilical hernia     Venous insufficiency (chronic) (peripheral)     Venous stasis ulcers ([address] 2017    Vitamin D deficiency        Past Surgical History:   Procedure Laterality Date    COLONOSCOPY,DIAGNOSTIC  2014         HX BACK SURGERY      bone spurs removed from lumbar spine (per pt)    HX CAROTID ENDARTERECTOMY      left    HX CAROTID ENDARTERECTOMY Right 2019    HX CATARACT REMOVAL      bilateral lens implant bilateral    HX HERNIA REPAIR  2018    Right inguinal hernia repir with mesh    HX ORTHOPAEDIC      foreign body removal-nail   right leg    [place] EGD TRANSORAL BIOPSY SINGLE/MULTIPLE  2013         TOTAL KNEE ARTHROPLASTY      bilateral    UPPER GI ENDOSCOPY,BALL DIL,30MM  2017         UPPER GI ENDOSCOPY,BIOPSY  2017            Family History   Problem Relation Age of Onset    Cancer Mother     Hypertension Mother     Diabetes Sister     Arthritis Sister     Substance Abuse Brother         tobacco use    Arthritis Brother     Heart Disease Brother     Hypertension Brother     Hypertension Sister     Hypertension Sister        Social History     Socioeconomic History    Marital status:      Spouse name: Not on file    Number of children: Not on file    Years of education: Not on file    Highest education level: Not on file   Tobacco Use    Smoking status: Former Smoker     Packs/day: 2.00     Years: 35.00     Pack years: 70.00     Last attempt to quit: 1998     Years since quittin.3    Smokeless tobacco: Never Used Substance and Sexual Activity    Alcohol use: Yes     Alcohol/week: 5.8 standard drinks     Types: 7 Standard drinks or equivalent per week     Frequency: Monthly or less     Drinks per session: 1 or 2     Binge frequency: Never     Comment: rarely    Drug use: Never     Types: Prescription, OTC    Sexual activity: Not Currently     Partners: Female       Review of Systems - Review of Systems   Constitutional: Positive for chills. Negative for diaphoresis, fever, malaise/fatigue and weight loss. Eyes: Negative. Respiratory: Positive for sputum production. Negative for cough, hemoptysis, shortness of breath and wheezing. Cardiovascular: Positive for leg swelling. Negative for chest pain, palpitations, orthopnea, claudication and PND. Wears compression stockings   Gastrointestinal: Positive for diarrhea. Negative for abdominal pain, blood in stool, constipation, heartburn, melena, nausea and vomiting. Genitourinary: Negative. Musculoskeletal: Positive for back pain and joint pain. Negative for falls, myalgias and neck pain. Bilateral knees   Skin: Negative for itching and rash. Patient fell and has bruising to right arm  Patient also scratches himself alot   Neurological: Positive for dizziness and tingling. Negative for tremors, sensory change, speech change, focal weakness, seizures, loss of consciousness, weakness and headaches. Right arn   Endo/Heme/Allergies: Negative for environmental allergies and polydipsia. Bruises/bleeds easily. Psychiatric/Behavioral: Positive for memory loss. Negative for depression, hallucinations, substance abuse and suicidal ideas. The patient is not nervous/anxious and does not have insomnia. Outpatient Medications Marked as Taking for the 10/21/19 encounter (Office Visit) with Cody Huynh MD   Medication Sig Dispense Refill    omega-3 acid ethyl esters (LOVAZA) 1 gram capsule Take 2 Caps by mouth two (2) times a day.  [address] [place] [address] Cap 1    atorvastatin (LIPITOR) 40 mg tablet Take 1 Tab by mouth nightly. 90 Tab 1    metFORMIN ER (GLUCOPHAGE XR) 500 mg tablet TAKE 2 TABLETS TWICE A  Tab 3    atenolol (TENORMIN) 50 mg tablet TAKE 1 TABLET DAILY 90 Tab 3    aspirin 81 mg chewable tablet Take 81 mg by mouth daily.  clopidogrel (PLAVIX) 75 mg tab Take 75 mg by mouth daily.  fluticasone propionate (FLONASE) 50 mcg/actuation nasal spray USE 2 SPRAYS NASALLY DAILY 2880 g 3    nystatin-triamcinolone (MYCOLOG II) topical cream Apply  to affected area two (2) times a day. 180 g 3    cloNIDine HCl (CATAPRES) 0.1 mg tablet TAKE 1 TABLET TWICE A  Tab 3    furosemide (LASIX) 80 mg tablet TAKE ONE AND ONE-HALF TABLETS DAILY 145 Tab 3    lisinopril (PRINIVIL, ZESTRIL) 40 mg tablet TAKE 1 TABLET DAILY 90 Tab 3    FREESTYLE LITE STRIPS strip USE ONCE DAILY 100 Strip 3    mometasone (ELOCON) 0.1 % topical cream APPLY 1 APPLICATION TWICE A  g 3    amLODIPine (NORVASC) 5 mg tablet TAKE 1 TABLET DAILY 90 Tab 3    clotrimazole-betamethasone (LOTRISONE) topical cream Apply  to affected area two (2) times a day.  BLOOD-GLUCOSE METER (FREESTYLE LITE METER) by Does Not Apply route.  vardenafil (LEVITRA) 20 mg tablet Take 20 mg by mouth as needed.  PSYLLIUM SEED, WITH DEXTROSE, (FIBER PO) Take  by mouth daily. Indications: 2 TBSP daily      polyethylene glycol (MIRALAX) 17 gram/dose powder Take 17 g by mouth daily as needed.  Cholecalciferol, Vitamin D3, (VITAMIN D3) 1,000 unit cap Take 1 Cap by mouth two (2) times a day.  multivitamin (MULTIPLE VITAMINS) tablet Take 1 Tab by mouth daily. Allergies   Allergen Reactions    Pcn [Penicillins] Swelling     Swelling of legs & feet    Oxycontin [Oxycodone] Other (comments)     Agitation/felt irritable    Ciprofloxacin Other (comments)     Pt states his blood sugar dropped when on Cipro, and he developed blisters on his feet.       Ibuprofen Other (comments)     \"Rage\"    Niaspan [Niacin] Rash    Vesicare [Solifenacin] Other (comments)     Attributes: Abdominal pain       Vitals:    10/21/19 1503   Resp: 18   Weight: 94.3 kg (208 lb)   Height: 5' 11\" (1.803 m)       Physical Exam   Constitutional: He appears well-developed and well-nourished. HENT:   Head: Normocephalic and atraumatic. Eyes: Conjunctivae and EOM are normal.   Abdominal: Soft. He exhibits no distension and no mass. There is no tenderness. Musculoskeletal: Normal range of motion. Lymphadenopathy:     He has no cervical adenopathy. Right: No inguinal adenopathy present. Left: No inguinal adenopathy present. Neurological: No sensory deficit. Skin: Skin is warm and dry. Psychiatric: He has a normal mood and affect. His speech is normal.   Digital rectal exam: Slightly reduced resting tone, no mass    Assessment / Plan    Fecal leakage  Continue with Metamucil, add Imodium once or twice per day  Follow-up in the office in 4 to 6 weeks if symptoms persist    The diagnoses and plan were discussed with the patient. All questions answered. Plan of care agreed to by all concerned.

## 2019-10-21 NOTE — LETTER
10/21/19 Patient: Gary Flor YOB: 1935 Date of Visit: 10/21/2019 Jerardo De Anda MD 
[address] [place] [address] [id number] [address] VIA In Basket Dear Aamir Bellamy, 
 
I saw Ralph Keller in the office today for fecal leakage. He says this occurs every 2 to 3 days and is usually liquid in consistency. He is currently on a fiber powder and I will add Imodium, 1 or 2 tablets daily to see if further solidification have his stool will prevent the leakage. He can follow-up in the office in 4 to 6 weeks if symptoms persist. 
 
If you have questions, please do not hesitate to call me. I look forward to following your patient along with you. Sincerely, Ronak Costello MD

## 2019-11-05 NOTE — TELEPHONE ENCOUNTER
This pharmacy faxed over request for the following prescriptions to be filled:    Medication requested :   Requested Prescriptions     Pending Prescriptions Disp Refills    lisinopril (PRINIVIL, ZESTRIL) 40 mg tablet 90 Tab 3    furosemide (LASIX) 80 mg tablet 145 Tab 3     PCP: Robert Monterroso or Print: [facility]   Mail order or Local pharmacy Mail Order     Scheduled appointment if not seen by current providers in office: LOV 10/2/2019 f/u 11/15/2019

## 2019-11-07 NOTE — PROGRESS NOTES
Contacted patient and verified identity using name and date of birth (2- identifiers)  Spoke with patient and advised of anemia on labs and low TSH (thyroid working harder than it should) and need for further evaluation from Endocrine. Take MVI daily.  Patient verbalized understanding

## 2019-11-07 NOTE — TELEPHONE ENCOUNTER
Contacted patient and verified identity using name and date of birth (2- identifiers)  Spoke with patient and he confirmed dose of Lasix:    Lasix 80 mg  1 1/2 tabs daily

## 2019-11-08 RX ORDER — LISINOPRIL 40 MG/1
40 TABLET ORAL DAILY
Qty: 90 TAB | Refills: 0 | Status: SHIPPED | OUTPATIENT
Start: 2019-11-08 | End: 2020-03-11 | Stop reason: SDUPTHER

## 2019-11-08 RX ORDER — FUROSEMIDE 80 MG/1
120 TABLET ORAL DAILY
Qty: 145 TAB | Refills: 2 | Status: SHIPPED | OUTPATIENT
Start: 2019-11-08 | End: 2020-01-13 | Stop reason: SDUPTHER

## 2019-11-15 ENCOUNTER — OFFICE VISIT (OUTPATIENT)
Dept: FAMILY MEDICINE CLINIC | Age: 84
End: 2019-11-15

## 2019-11-15 VITALS
HEIGHT: 71 IN | SYSTOLIC BLOOD PRESSURE: 130 MMHG | DIASTOLIC BLOOD PRESSURE: 60 MMHG | TEMPERATURE: 97.2 F | RESPIRATION RATE: 16 BRPM | BODY MASS INDEX: 29.01 KG/M2 | OXYGEN SATURATION: 97 % | HEART RATE: 61 BPM

## 2019-11-15 DIAGNOSIS — N40.0 BENIGN PROSTATIC HYPERPLASIA WITHOUT LOWER URINARY TRACT SYMPTOMS: ICD-10-CM

## 2019-11-15 DIAGNOSIS — I10 ESSENTIAL HYPERTENSION: ICD-10-CM

## 2019-11-15 DIAGNOSIS — E87.1 HYPONATREMIA: ICD-10-CM

## 2019-11-15 DIAGNOSIS — R79.89 ABNORMAL CBC: ICD-10-CM

## 2019-11-15 DIAGNOSIS — R15.9 INCONTINENCE OF FECES, UNSPECIFIED FECAL INCONTINENCE TYPE: ICD-10-CM

## 2019-11-15 DIAGNOSIS — M79.89 LEG SWELLING: ICD-10-CM

## 2019-11-15 DIAGNOSIS — R79.89 LOW TSH LEVEL: ICD-10-CM

## 2019-11-15 DIAGNOSIS — R20.0 NUMBNESS AND TINGLING: ICD-10-CM

## 2019-11-15 DIAGNOSIS — R20.2 NUMBNESS AND TINGLING: ICD-10-CM

## 2019-11-15 DIAGNOSIS — G89.29 CHRONIC LOW BACK PAIN WITH SCIATICA, SCIATICA LATERALITY UNSPECIFIED, UNSPECIFIED BACK PAIN LATERALITY: ICD-10-CM

## 2019-11-15 DIAGNOSIS — I77.9 BILATERAL CAROTID ARTERY DISEASE, UNSPECIFIED TYPE (HCC): ICD-10-CM

## 2019-11-15 DIAGNOSIS — N18.2 CKD (CHRONIC KIDNEY DISEASE) STAGE 2, GFR 60-89 ML/MIN: ICD-10-CM

## 2019-11-15 DIAGNOSIS — E11.9 WELL CONTROLLED DIABETES MELLITUS (HCC): Primary | ICD-10-CM

## 2019-11-15 DIAGNOSIS — M54.40 CHRONIC LOW BACK PAIN WITH SCIATICA, SCIATICA LATERALITY UNSPECIFIED, UNSPECIFIED BACK PAIN LATERALITY: ICD-10-CM

## 2019-11-15 RX ORDER — TOPIRAMATE 25 MG/1
25 TABLET ORAL
Qty: 30 TAB | Refills: 2 | Status: SHIPPED | OUTPATIENT
Start: 2019-11-15 | End: 2019-11-15

## 2019-11-15 RX ORDER — TOPIRAMATE 25 MG/1
25 TABLET ORAL
Qty: 90 TAB | Refills: 0 | Status: SHIPPED | OUTPATIENT
Start: 2019-11-15

## 2019-11-15 RX ORDER — OMEGA-3-ACID ETHYL ESTERS 1 G/1
2 CAPSULE, LIQUID FILLED ORAL 2 TIMES DAILY
Qty: 360 CAP | Refills: 1 | Status: SHIPPED | OUTPATIENT
Start: 2019-11-15 | End: 2020-03-11 | Stop reason: SDUPTHER

## 2019-11-15 NOTE — PROGRESS NOTES
1. Have you been to the ER, urgent care clinic since your last visit? Hospitalized since your last visit? No    2. Have you seen or consulted any other health care providers outside of the [address] [place] [address] since your last visit? Include any pap smears or colon screening. No    Chief Complaint   Patient presents with    Diabetes    COPD    Anemia    Dysphagia    Peripheral Neuropathy     bilat foot pain/stinging    New Order     requests order for a lift wheel chair - patient has info for specific chair he is requesting.

## 2019-11-15 NOTE — PROGRESS NOTES
HISTORY OF PRESENT ILLNESS  Roselyn Barcenas is a 82 y.o. male. HPI; brought by his care taker for follow up. Recently established care as moved from [place]. Lives alone. Care taker comes 3 times a week. His daughter keeps an eye on him. Today asking for power chair lift. He has chronic lower back pain / spinal stenosis. Used to follow ortho / spine specialist. Has sciatica and radiculopathy in lower ext. Tingling and numbness in lower ext. On and off. Taking symptomatic treatment as needed. Currently denies any worsening of lower back pain. Having trouble getting up with putting pressure on wheel chair handles. Lives alone and need to come out of chair on his own and having difficulties. Will consider putting prescription on power wheel chair lift. He agrees to get a new referral to spine specialist locally as now it will be too far for him to follow prior spine specialist.   For now pain is mild in intensity. He does do his ADL with minimal help. Does get help with cook and clean. H/o BPH and sometimes accidents. No stream changes. No blood in urine. For now denies any dysuria. Also last visit mentioned bowel incontinence. Seen Dr. Marcum Pretty. Gino Buenrostro now he is taking imodium 3-4 times a day and it has improved some. No blood in stool. No abdominal pain. No appetite or weight changes. Sitting comfortable. H/o CKD,. But recent labs showed GFR >60 and cr wnl. Not following any specialist.     Noted on labs low tsh. ? Diarrhea or loose stool from it. For now pending appt with endo. Denies any chest pain or sob. / no palpitation or diaphoresis. H/o hypertension. Complaint with taking medication and today vitals stable. Had an ER visit after fall and noted electrolytes abnormality . Repeat labs improved potassium still has low sodium. He is on lasix. Said he used to have lots of leg swelling. Now it is stable on one and half tab of 80 mg lasix. No sob. No cough or wheezing.    Not a good historian. Denies any CHF history. Review prior records and noted echo back few years ago and EF around 60 %. Denies any h/o CAD. He does have carotid artery disease. Had stent placement few months ago. Pending records. On plavix and anticoagulation. Also ? Prior h/o DVT. Denies any specific concern from anticoagulation. Visit Vitals  /60 (BP 1 Location: Left arm, BP Patient Position: Sitting)   Pulse 61   Temp 97.2 °F (36.2 °C) (Oral)   Resp 16   Ht 5' 11\" (1.803 m)   SpO2 97%   BMI 29.01 kg/m²     Review medication list, vitals, problem list,allergies. Review labs. Lab Results   Component Value Date/Time    WBC 8.4 10/02/2019 12:20 PM    Hemoglobin (POC) 16.7 06/26/2012 08:28 AM    HGB (POC) 11.8 (A) 01/08/2019 10:37 AM    HGB 11.3 (L) 10/02/2019 12:20 PM    Hematocrit (POC) 49 06/26/2012 08:28 AM    HCT (POC) 35.5 (A) 01/08/2019 10:37 AM    HCT 37.1 10/02/2019 12:20 PM    PLATELET 294 42/03/2820 12:20 PM    MCV 90.5 10/02/2019 12:20 PM     Lab Results   Component Value Date/Time    Sodium 142 10/02/2019 12:20 PM    Potassium 4.9 10/02/2019 12:20 PM    Chloride 107 10/02/2019 12:20 PM    CO2 29 10/02/2019 12:20 PM    Anion gap 6 10/02/2019 12:20 PM    Glucose 126 (H) 10/02/2019 12:20 PM    BUN 53 (H) 10/02/2019 12:20 PM    Creatinine 0.73 10/02/2019 12:20 PM    BUN/Creatinine ratio 73 (H) 10/02/2019 12:20 PM    GFR est AA >60 10/02/2019 12:20 PM    GFR est non-AA >60 10/02/2019 12:20 PM    Calcium 9.9 10/02/2019 12:20 PM    Bilirubin, total 0.4 10/02/2019 12:20 PM    AST (SGOT) 19 10/02/2019 12:20 PM    Alk.  phosphatase 92 10/02/2019 12:20 PM    Protein, total 7.7 10/02/2019 12:20 PM    Albumin 3.9 10/02/2019 12:20 PM    Globulin 3.8 10/02/2019 12:20 PM    A-G Ratio 1.0 10/02/2019 12:20 PM    ALT (SGPT) 24 10/02/2019 12:20 PM     Lab Results   Component Value Date/Time    Cholesterol, total 114 07/08/2019 11:59 AM    Cholesterol (POC) 111.0 01/08/2019 10:37 AM    HDL Cholesterol 27 (L) 07/08/2019 11:59 AM    HDL Cholesterol (POC) 32.0 (A) 01/08/2019 10:37 AM    LDL Cholesterol (POC) 54.4 01/08/2019 10:37 AM    LDL, calculated 50 07/08/2019 11:59 AM    VLDL, calculated 25 07/03/2018 09:34 AM    VLDL 37 07/08/2019 11:59 AM    Triglyceride 183 07/08/2019 11:59 AM    Triglycerides (POC) 123.0 01/08/2019 10:37 AM    CHOL/HDL Ratio 4 07/08/2019 11:59 AM       Lab Results   Component Value Date/Time    TSH 0.29 (L) 10/02/2019 12:20 PM     Lab Results   Component Value Date/Time    Hemoglobin A1c 6.0 (H) 07/08/2019 11:58 AM    Hemoglobin A1c (POC) 6.5 (A) 01/08/2019 10:37 AM     Lab Results   Component Value Date/Time    Vitamin D 25-Hydroxy 23.6 (L) 10/09/2015 01:16 PM       No results found for: B12LT, FOL, RBCF    Well controlled diabetes. Noted on metformin. ? Can cause loose stool. Taking it since long time. Noted abnormal cbc , low wbc and low platelets. Repeat labs showed platelet wnl. Low wbc. Will observe. For now no recurrent cold or fever. Denies any headaches or dizziness. No appetite or weight changes. No unusual fatigue. MRI Results (most recent):  Results from [place] encounter on 10/20/16   MRI LUMB SPINE WO CONT    Narrative EXAM:  MRI LUMB SPINE WO CONT  Clinical history: Chronic back pain  INDICATION:  CHRONIC BACK PAIN ,COMPRESSION FX. Evaluate for presence of acute  compression fracture        COMPARISON: 3/12/2015    TECHNIQUE: MR imaging of the lumbar spine was performed using the following  sequences: sagittal T1, T2, STIR;  axial T1, T2.     CONTRAST:  None. FINDINGS:  Interval postprocedural changes at L4-L5. No unusual postprocedural findings. Grade 1 retrolisthesis L2-3 and grade 1 anterolisthesis L5-S1. There is superior  endplate deformity of L2 which is chronic. There is a small area of focal marrow  edema in the anterior aspect of the inferior endplate of L2, adjacent to disc  degenerative changes and osteophyte formation.  Cortical margin appears intact. The conus medullaris terminates at L1-2. Infrarenal aorta measures up to 3.2 cm. Right and left renal cyst. No sacral insufficiency fracture. L1/2:  There is mild disc bulge, without significant spinal canal or foraminal  stenosis. L2/3:  There is diffuse disc bulge and a small central disc extrusion extending  inferiorly, causing mild spinal canal stenosis. Foramina are patent. L3/4:  There is diffuse disc bulge and facet degeneration without significant  spinal canal stenosis. Minimal if any foraminal stenosis. L4/5:  Diffuse disc bulge, interval laminectomy. No canal stenosis. .  There is  mild bilateral foraminal stenosis. L5/S1:  There is diffuse disc bulge and facet degeneration, causing mild if any  spinal canal stenosis. Mild bilateral foraminal stenosis. Impression IMPRESSION:  1.  Multilevel degenerative disc disease, interval laminectomy at L4-5. There is  no central canal stenosis present. No acute fracture or dislocation. 2.  Chronic superior endplate deformity L2. Focal marrow edema inferior  anterior L2 vertebral body is stable and likely related to degenerative change. 3. Infrarenal abdominal aortic aneurysm measures 3.2 cm. ROS: see HPI     Physical Exam   Constitutional: He is oriented to person, place, and time. No distress. Neck: No thyromegaly present. Cardiovascular: Normal rate, regular rhythm and normal heart sounds. Pulmonary/Chest:   CTA   Abdominal: Soft. Bowel sounds are normal. There is no tenderness. Musculoskeletal: He exhibits no edema. Generalize tenderness over lumbosacral spine. No point tenderness. Paraspinal muscle tenderness present around same level. Not able to lift himself from the wheel chair alone during visit. ROM limitation. Lymphadenopathy:     He has no cervical adenopathy. Neurological: He is oriented to person, place, and time.    Grossly intact    Psychiatric: His behavior is normal.       ASSESSMENT and PLAN    ICD-10-CM ICD-9-CM    1. Well controlled diabetes mellitus ([facility].): for now continue metfromin. If loose stool persist will consider change it to [facility]. E11.9 250.00    2. Low TSH level: now pending appt with endo. ? Loose stool could be from it. R79.89 794.5    3. CKD (chronic kidney disease) stage 2, GFR 60-89 ml/min: recent GFR >60. Will observe. N18.2 585.2    4. Incontinence of feces, unspecified fecal incontinence type R15.9 787.60     seen Dr. Eduin Booker. given imodium. per patient feels some changes. 5. Benign prostatic hyperplasia without lower urinary tract symptoms: for now stable. Will observe. N40.0 600.00    6. Essential hypertension: well controlled. Continue current dose of medication and low salt diet. Exercise as tolerated. I10 401.9    7. Hyponatremia: could be from lasix. For now as no signs of volume over load discuss to cut down lasix to one tab and if needed add half tablet. E87.1 276.1    8. Abnormal CBC; observe for now.  R79.89 790.6     low wbc and repeat lab showed platelet wnl as it used to be on lower side. will observe. 9. Bilateral carotid artery disease, unspecified type ([facility].) I73.9 447.9     recently had stent done. following Vascular specialist .Dr. Ene Shultz in [place]    10. Leg swelling: see above. Cut down lasix to one tab. If needed add half tab. Low salt diet. Leg elevation and continue wearing stocking. M79.89 729.81     on lasix. will consider going to one tab daily and if needed extra half tablet instead of daily one and half tab. 11. Numbness and tingling R20.0 782.0 REFERRAL TO SPINE SURGERY    R20.2  topiramate (TOPAMAX) 25 mg tablet      DISCONTINUED: topiramate (TOPAMAX) 25 mg tablet    both foot    12. Chronic low back pain with sciatica, sciatica laterality unspecified, unspecified back pain laterality: for now  Will consider adding topamax and see if his radiculopathy gets improved.  Also done spine specialist referral. Will do a prescription for a wheel chair for him. M54.40 724.2 REFERRAL TO SPINE SURGERY    G89.29 724.3 topiramate (TOPAMAX) 25 mg tablet     338.29 DISCONTINUED: topiramate (TOPAMAX) 25 mg tablet    both lower ext. Pt understood and agree with the plan   Review HM     Total time spend with pt more than 35 minutes in discussion above plan and review prior records and answered all his questions during visit. Follow-up and Dispositions    · Return in about 2 months (around 1/15/2020).

## 2019-11-15 NOTE — PATIENT INSTRUCTIONS
Low Sodium Diet (2,000 Milligram): Care Instructions  Your Care Instructions    Too much sodium causes your body to hold on to extra water. This can raise your blood pressure and force your heart and kidneys to work harder. In very serious cases, this could cause you to be put in the hospital. It might even be life-threatening. By limiting sodium, you will feel better and lower your risk of serious problems. The most common source of sodium is salt. People get most of the salt in their diet from canned, prepared, and packaged foods. Fast food and restaurant meals also are very high in sodium. Your doctor will probably limit your sodium to less than 2,000 milligrams (mg) a day. This limit counts all the sodium in prepared and packaged foods and any salt you add to your food. Follow-up care is a key part of your treatment and safety. Be sure to make and go to all appointments, and call your doctor if you are having problems. It's also a good idea to know your test results and keep a list of the medicines you take. How can you care for yourself at home? Read food labels  · Read labels on cans and food packages. The labels tell you how much sodium is in each serving. Make sure that you look at the serving size. If you eat more than the serving size, you have eaten more sodium. · Food labels also tell you the Percent Daily Value for sodium. Choose products with low Percent Daily Values for sodium. · Be aware that sodium can come in forms other than salt, including monosodium glutamate (MSG), sodium citrate, and sodium bicarbonate (baking soda). MSG is often added to Asian food. When you eat out, you can sometimes ask for food without MSG or added salt. Buy low-sodium foods  · Buy foods that are labeled \"unsalted\" (no salt added), \"sodium-free\" (less than 5 mg of sodium per serving), or \"low-sodium\" (less than 140 mg of sodium per serving).  Foods labeled \"reduced-sodium\" and \"light sodium\" may still have too much sodium. Be sure to read the label to see how much sodium you are getting. · Buy fresh vegetables, or frozen vegetables without added sauces. Buy low-sodium versions of canned vegetables, soups, and other canned goods. Prepare low-sodium meals  · Cut back on the amount of salt you use in cooking. This will help you adjust to the taste. Do not add salt after cooking. One teaspoon of salt has about 2,300 mg of sodium. · Take the salt shaker off the table. · Flavor your food with garlic, lemon juice, onion, vinegar, herbs, and spices. Do not use soy sauce, lite soy sauce, steak sauce, onion salt, garlic salt, celery salt, mustard, or ketchup on your food. · Use low-sodium salad dressings, sauces, and ketchup. Or make your own salad dressings and sauces without adding salt. · Use less salt (or none) when recipes call for it. You can often use half the salt a recipe calls for without losing flavor. Other foods such as rice, pasta, and grains do not need added salt. · Rinse canned vegetables, and cook them in fresh water. This removes some--but not all--of the salt. · Avoid water that is naturally high in sodium or that has been treated with water softeners, which add sodium. Call your local water company to find out the sodium content of your water supply. If you buy bottled water, read the label and choose a sodium-free brand. Avoid high-sodium foods  · Avoid eating:  ? Smoked, cured, salted, and canned meat, fish, and poultry. ? Ham, reddy, hot dogs, and luncheon meats. ? Regular, hard, and processed cheese and regular peanut butter. ? Crackers with salted tops, and other salted snack foods such as pretzels, chips, and salted popcorn. ? Frozen prepared meals, unless labeled low-sodium. ? Canned and dried soups, broths, and bouillon, unless labeled sodium-free or low-sodium. ? Canned vegetables, unless labeled sodium-free or low-sodium. ? [employer]ia fries, pizza, tacos, and other fast foods.   ? Jovani Caballero olives, ketchup, and other condiments, especially soy sauce, unless labeled sodium-free or low-sodium. Where can you learn more? Go to http://julio césar-precious.info/. Enter R199 in the search box to learn more about \"Low Sodium Diet (2,000 Milligram): Care Instructions. \"  Current as of: November 7, 2018  Content Version: 12.2  © 3868-7679 [employer]. Care instructions adapted under license by [employer] (which disclaims liability or warranty for this information). If you have questions about a medical condition or this instruction, always ask your healthcare professional. [address] any warranty or liability for your use of this information. Nutrition Tips for Diabetes: After Your Visit  Your Care Instructions  A healthy diet is important to manage diabetes. It helps you lose weight (if you need to) and keep it off. It gives you the nutrition and energy your body needs and helps prevent heart disease. But a diet for diabetes does not mean that you have to eat special foods. You can eat what your family eats, including occasional sweets and other favorites. But you do have to pay attention to how often you eat and how much you eat of certain foods. The right plan for you will give you meals that help you keep your blood sugar at healthy levels. Try to eat a variety of foods and to spread carbohydrate throughout the day. Carbohydrate raises blood sugar higher and more quickly than any other nutrient does. Carbohydrate is found in sugar, breads and cereals, fruit, starchy vegetables such as potatoes and corn, and milk and yogurt. You may want to work with a dietitian or diabetes educator to help you plan meals and snacks. A dietitian or diabetes educator also can help you lose weight if that is one of your goals. The following tips can help you enjoy your meals and stay healthy. Follow-up care is a key part of your treatment and safety.  Be sure to make and go to all appointments, and call your doctor if you are having problems. Its also a good idea to know your test results and keep a list of the medicines you take. How can you care for yourself at home? · Learn which foods have carbohydrate and how much carbohydrate to eat. A dietitian or diabetes educator can help you learn to keep track of how much carbohydrate you eat. · Spread carbohydrate throughout the day. Eat some carbohydrate at all meals, but do not eat too much at any one time. · Plan meals to include food from all the food groups. These are the food groups and some example portion sizes:  ¨ Grains: 1 slice of bread (1 ounce), ½ cup of cooked cereal, and 1/3 cup of cooked pasta or rice. These have about 15 grams of carbohydrate in a serving. Choose whole grains such as whole wheat bread or crackers, oatmeal, and brown rice more often than refined grains. ¨ Fruit: 1 small fresh fruit, such as an apple or orange; ½ of a banana; ½ cup of chopped, cooked, or canned fruit; ½ cup of fruit juice; 1 cup of melon or raspberries; and 2 tablespoons of dried fruit. These have about 15 grams of carbohydrate in a serving. ¨ Dairy: 1 cup of nonfat or low-fat milk and 2/3 cup of plain yogurt. These have about 15 grams of carbohydrate in a serving. ¨ Protein foods: Beef, chicken, turkey, fish, eggs, tofu, cheese, cottage cheese, and peanut butter. A serving size of meat is 3 ounces, which is about the size of a deck of cards. Examples of meat substitute serving sizes (equal to 1 ounce of meat) are 1/4 cup of cottage cheese, 1 egg, 1 tablespoon of peanut butter, and ½ cup of tofu. These have very little or no carbohydrate per serving. ¨ Vegetables: Starchy vegetables such as ½ cup of cooked dried beans, peas, potatoes, or corn have about 15 grams of carbohydrate.  Nonstarchy vegetables have very little carbohydrate, such as 1 cup of raw leafy vegetables (such as spinach), ½ cup of other vegetables (cooked or chopped), and 3/4 cup of vegetable juice. · Use the plate format to plan meals. It is a good, quick way to make sure that you have a balanced meal. It also helps you spread carbohydrate throughout the day. You divide your plate by types of foods. Put vegetables on half the plate, meat or meat substitutes on one-quarter of the plate, and a grain or starchy vegetable (such as brown rice or a potato) in the final quarter of the plate. To this you can add a small piece of fruit and 1 cup of milk or yogurt, depending on how much carbohydrate you are supposed to eat at a meal.  · Talk to your dietitian or diabetes educator about ways to add limited amounts of sweets into your meal plan. You can eat these foods now and then, as long as you include the amount of carbohydrate they have in your daily carbohydrate allowance. · If you drink alcohol, limit it to no more than 1 drink a day for women and 2 drinks a day for men. If you are pregnant, no amount of alcohol is known to be safe. · Protein, fat, and fiber do not raise blood sugar as much as carbohydrate does. If you eat a lot of these nutrients in a meal, your blood sugar will rise more slowly than it would otherwise. · Limit saturated fats, such as those from meat and dairy products. Try to replace it with monounsaturated fat, such as olive oil. This is a healthier choice because people who have diabetes are at higher-than-average risk of heart disease. But use a modest amount of olive oil. A tablespoon of olive oil has 14 grams of fat and 120 calories. · Exercise lowers blood sugar. If you take insulin by shots or pump, you can use less than you would if you were not exercising. Keep in mind that timing matters. If you exercise within 1 hour after a meal, your body may need less insulin for that meal than it would if you exercised 3 hours after the meal. Test your blood sugar to find out how exercise affects your need for insulin.   · Exercise on most days of the week. Aim for at least 30 minutes. Exercise helps you stay at a healthy weight and helps your body use insulin. Walking is an easy way to get exercise. Gradually increase the amount you walk every day. You also may want to swim, bike, or do other activities. When you eat out  · Learn to estimate the serving sizes of foods that have carbohydrate. If you measure food at home, it will be easier to estimate the amount in a serving of restaurant food. · If the meal you order has too much carbohydrate (such as potatoes, corn, or baked beans), ask to have a low-carbohydrate food instead. Ask for a salad or green vegetables. · If you use insulin, check your blood sugar before and after eating out to help you plan how much to eat in the future. · If you eat more carbohydrate at a meal than you had planned, take a walk or do other exercise. This will help lower your blood sugar. Where can you learn more? Go to [employer].be  Enter Z569 in the search box to learn more about \"Nutrition Tips for Diabetes: After Your Visit. \"   © 3137-8497 [employer]. Care instructions adapted under license by [facility] (which disclaims liability or warranty for this information). This care instruction is for use with your licensed healthcare professional. If you have questions about a medical condition or this instruction, always ask your healthcare professional. [address] any warranty or liability for your use of this information.   Content Version: 62.1.543354; Current as of: June 4, 2014

## 2019-12-05 ENCOUNTER — TELEPHONE (OUTPATIENT)
Dept: FAMILY MEDICINE CLINIC | Age: 84
End: 2019-12-05

## 2019-12-05 NOTE — TELEPHONE ENCOUNTER
Patient called office concerned with some side effects he experienced after starting Topamax. He indicated that he got very angry to the point of wanting to hurt himself or someone else (denies current thoughts), bearing of teeth- still has some jaw pain, had episode of nocturia and HA. He stated hat he started the medication on 11/23 and took it for 4 days and stopped after 11/26 dose. I advised patient that I would send the message to Dr. Anshul Major for recommendations.

## 2019-12-06 NOTE — TELEPHONE ENCOUNTER
Please continue holding medication and have f/u to discuss it further . Go to Er with any worsening of symptoms.

## 2019-12-13 ENCOUNTER — OFFICE VISIT (OUTPATIENT)
Dept: ORTHOPEDIC SURGERY | Age: 84
End: 2019-12-13

## 2019-12-13 VITALS
SYSTOLIC BLOOD PRESSURE: 138 MMHG | OXYGEN SATURATION: 99 % | HEART RATE: 67 BPM | DIASTOLIC BLOOD PRESSURE: 80 MMHG | RESPIRATION RATE: 16 BRPM | BODY MASS INDEX: 29.01 KG/M2 | HEIGHT: 71 IN

## 2019-12-13 DIAGNOSIS — M51.36 DDD (DEGENERATIVE DISC DISEASE), LUMBAR: ICD-10-CM

## 2019-12-13 DIAGNOSIS — M96.1 LUMBAR POSTLAMINECTOMY SYNDROME: Primary | ICD-10-CM

## 2019-12-13 NOTE — LETTER
12/13/19 Patient: Josy Vides YOB: 1935 Date of Visit: 12/13/2019 Darcy Bernabe MD 
[facility] [address] [address] [address] [address] VIA In Basket Rosa Braxton, [address] [address] [address] [address] VIA In Basket Dear MD Rosa Conner MD, Thank you for referring Mr. Neli Smyth to [address] for evaluation. My notes for this consultation are attached. If you have questions, please do not hesitate to call me. I look forward to following your patient along with you. Sincerely, Shaneka Andres MD

## 2019-12-13 NOTE — PROGRESS NOTES
[facility]  [address]  [address], [place]   Phone: [phone]  Fax: [phone]        INITIAL CONSULTATION      HISTORY OF PRESENT ILLNESS:  Reyes Phillips is a 82 y.o. male whom is referred from Angely Velasco MD secondary to chronic low back pain, denies injury. He rates his pain 4/10. Previously had radicular sxs, however no longer experiencing radicular sxs at this time. Pain exacerbated with standing and walking. Pt completed PT 2 years ago. Performing his HEP inconsistently. Pt did not tolerate TOPAMAX secondary to suicidal thoughts. Treated with Aleve but Angely Velasco MD took him off because he is currently on Plavix. Pt denies change in bowel or bladder habits. Pt denies fever, weight loss, or skin changes. PmHx DVT, DM, stage 2 CKD, COPD, L4-5 laminectomy (Dr. Bailey Goldberg, 10/20/15). Note from Dr. Jose Alejandro Wesley dated 3/13/15 indicating patient underwent translaminar steroid epidural injection L4-5 level. Note from Dr. Stef Carson dated 11/3/16 indicating patient underwent translaminar epidural steroid injection, level not documented. Note from Angely Velasco MD dated 11/15/19 indicating patient was seen with c/o low back pain / spinal stenosis. Radicular paraesthesias sxs in the lower ext. No recent worsening. Put her on Topamax. Pt mentioned bowel incontinence. Seen Dr. Nevaeh Winn. Referred to us. Lumbar spine XR dated 10/25/16 films not reviewed. Per report, No surgical hardware. No significant change given difference in technique. No fracture. Lumbar spine MRI dated 10/20/16 films not reviewed. Per report, Multilevel degenerative disc disease, interval laminectomy at L4-5. There is no central canal stenosis present. No acute fracture or dislocation. Chronic superior endplate deformity L2. Focal marrow edema inferior anterior L2 vertebral body is stable and likely related to degenerative change. Infrarenal abdominal aortic aneurysm measures 3.2 cm.     The patient is RHD.  reviewed. Body mass index is 29.01 kg/m².     PCP: Sigrid Burleson MD    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    Chronic kidney disease     Chronic pain     low back    CKD (chronic kidney disease) stage 2, GFR 60-89 ml/min     Class 1 obesity due to excess calories with serious comorbidity in adult 12/31/2017    Colon polyp 7/26/2017    Onset Date: 11/2001 Comments: H/O    Compression fracture of L2 lumbar vertebra ([address] 7/26/2017    Problem onset is 1985 Story: L2     COPD (chronic obstructive pulmonary disease) ([address]     DDD (degenerative disc disease)     chronic back pain    Diabetes ([address]     borderline    DJD (degenerative joint disease)     DVT (deep venous thrombosis) ([address]     1998    Dysphagia 7/26/2017    ED (erectile dysfunction) 7/26/2017    Edema extremities 7/26/2017    Fatty liver     Fecal soiling 7/26/2017    Frequent falls     GERD (gastroesophageal reflux disease)     H/O adenomatous polyp of colon     Hearing loss 7/26/2017    Story: wears bilat hearing aids     Heart valve disorder 7/26/2017    Story: mild MR/AI    Hiatal hernia 7/26/2017    Hypercholesterolemia     Hypertension     Incomplete right bundle branch block 7/26/2017    MR (mitral regurgitation)     Overactive bladder     Proteinuria 7/26/2017    Problem onset is 1975     PUD (peptic ulcer disease)     in late teens    Right shoulder pain 7/26/2017    Risk for falls 7/26/2017    Skin cancer     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    Thrombocytopenia (HCC)     Trigger middle finger of left hand 3/27/0592    Umbilical hernia 9/83/4831    Venous insufficiency (chronic) (peripheral)     Venous stasis ulcers ([facility] 2017    Vitamin D deficiency           Past Surgical History:   Procedure Laterality Date    COLONOSCOPY,DIAGNOSTIC  2014         HX BACK SURGERY      bone spurs removed from lumbar spine (per pt)    HX CAROTID ENDARTERECTOMY      left    HX CAROTID ENDARTERECTOMY Right 2019    HX CATARACT REMOVAL      bilateral lens implant bilateral    HX HERNIA REPAIR  2018    Right inguinal hernia repir with mesh    HX ORTHOPAEDIC      foreign body removal-nail   right leg    CT EGD TRANSORAL BIOPSY SINGLE/MULTIPLE  2013         TOTAL KNEE ARTHROPLASTY      bilateral    UPPER GI ENDOSCOPY,BALL DIL,30MM  2017         UPPER GI ENDOSCOPY,BIOPSY  2017              Social History     Tobacco Use    Smoking status: Former Smoker     Packs/day: 2.00     Years: 35.00     Pack years: 70.00     Last attempt to quit: 1998     Years since quittin.4    Smokeless tobacco: Never Used   Substance Use Topics    Alcohol use: Yes     Alcohol/week: 5.8 standard drinks     Types: 7 Standard drinks or equivalent per week     Frequency: Monthly or less     Drinks per session: 1 or 2     Binge frequency: Never     Comment: rarely       Work status: The patient is retired. Marital status: The patient is . Current Outpatient Medications   Medication Sig Dispense Refill    omega-3 acid ethyl esters (LOVAZA) 1 gram capsule Take 2 Caps by mouth two (2) times a day. 360 Cap 1    topiramate (TOPAMAX) 25 mg tablet Take 1 Tab by mouth daily (with breakfast). 90 Tab 0    lisinopril (PRINIVIL, ZESTRIL) 40 mg tablet Take 1 Tab by mouth daily. 90 Tab 0    furosemide (LASIX) 80 mg tablet Take 1.5 Tabs by mouth daily. 145 Tab 2    atorvastatin (LIPITOR) 40 mg tablet Take 1 Tab by mouth nightly.  90 Tab 1    metFORMIN ER (GLUCOPHAGE XR) 500 mg tablet TAKE 2 TABLETS TWICE A  Tab 3    atenolol (TENORMIN) 50 mg tablet TAKE 1 TABLET DAILY 90 Tab 3    aspirin 81 mg chewable tablet Take 81 mg by mouth daily.  clopidogrel (PLAVIX) 75 mg tab Take 75 mg by mouth daily.  fluticasone propionate (FLONASE) 50 mcg/actuation nasal spray USE 2 SPRAYS NASALLY DAILY 2880 g 3    nystatin-triamcinolone (MYCOLOG II) topical cream Apply  to affected area two (2) times a day. 180 g 3    cloNIDine HCl (CATAPRES) 0.1 mg tablet TAKE 1 TABLET TWICE A  Tab 3    FREESTYLE LITE STRIPS strip USE ONCE DAILY 100 Strip 3    mometasone (ELOCON) 0.1 % topical cream APPLY 1 APPLICATION TWICE A  g 3    amLODIPine (NORVASC) 5 mg tablet TAKE 1 TABLET DAILY 90 Tab 3    clotrimazole-betamethasone (LOTRISONE) topical cream Apply  to affected area two (2) times a day.  BLOOD-GLUCOSE METER (FREESTYLE LITE METER) by Does Not Apply route.  vardenafil (LEVITRA) 20 mg tablet Take 20 mg by mouth as needed.  PSYLLIUM SEED, WITH DEXTROSE, (FIBER PO) Take  by mouth daily. Indications: 2 TBSP daily      polyethylene glycol (MIRALAX) 17 gram/dose powder Take 17 g by mouth daily as needed.  Cholecalciferol, Vitamin D3, (VITAMIN D3) 1,000 unit cap Take 1 Cap by mouth two (2) times a day.  multivitamin (MULTIPLE VITAMINS) tablet Take 1 Tab by mouth daily. Allergies   Allergen Reactions    Pcn [Penicillins] Swelling     Swelling of legs & feet    Oxycontin [Oxycodone] Other (comments)     Agitation/felt irritable    Ciprofloxacin Other (comments)     Pt states his blood sugar dropped when on Cipro, and he developed blisters on his feet.  Ibuprofen Other (comments)     \"Rage\"    Niaspan [Niacin] Rash    Topamax [Topiramate] Unknown (comments)     Makes him feel like he is dying. Wants to hurt himself and other people when he takes it.     Vesicare [Solifenacin] Other (comments)     Attributes: Abdominal pain            Family History   Problem Relation Age of Onset    Cancer Mother     Hypertension Mother     Diabetes Sister    Alan Stuart Arthritis Sister     Substance Abuse Brother         tobacco use    Arthritis Brother     Heart Disease Brother     Hypertension Brother     Hypertension Sister     Hypertension Sister          REVIEW OF SYSTEMS  Constitutional symptoms: Negative  Eyes: Negative  Ears, Nose, Throat, and Mouth: Negative  Cardiovascular: Negative  Respiratory: Negative  Genitourinary: Negative  Integumentary (Skin and/or breast): Negative  Musculoskeletal: Positive for low back pain  Extremities: Negative for edema. Endocrine/Rheumatologic: Negative  Hematologic/Lymphatic: Negative  Allergic/Immunologic: Negative  Psychiatric: Negative       PHYSICAL EXAMINATION  Visit Vitals  /80   Pulse 67   Resp 16   Ht 5' 11\" (1.803 m)   SpO2 99%   BMI 29.01 kg/m²       CONSTITUTIONAL: NAD, A&O x 3  HEART: Regular rate and rhythm  ABDOMEN: Positive bowel sounds, soft, nontender, and nondistended  LUNGS: Clear to auscultation bilaterally. SKIN: Negative for rash. Well healed mid-line incision  RANGE OF MOTION: The patient has full passive range of motion in all four extremities. SENSATION: Sensation is intact to light touch throughout. EXTREMITIES: Arthritic changes, bilateral hands    MOTOR:   Straight Leg Raise: Negative, bilateral  Martinez: Negative, bilateral  Deep tendon reflexes are 0 at the brachioradialis, biceps, and triceps. Deep tendon reflexes are 0 at the knees and ankles bilaterally. Pt reports he has and AFO, not currently wearing it     Shoulder AB/Flex Elbow Flex Wrist Ext Elbow Ext Wrist Flex Hand Intrin Tone   Right +4/5 +4/5 +4/5 +4/5 +4/5 4/5 +4/5   Left +4/5 +4/5 +4/5 +4/5 +4/5 4/5 +4/5              Hip Flex Knee Ext Knee Flex Ankle DF GTE Ankle PF Tone   Right +4/5 +4/5 +4/5 2-3/5, chronic  2-3/5, chronic 2-3/5, chronic +4/5   Left +4/5 +4/5 +4/5 +4/5 3/5 +4/5 +4/5     Examined in a wheelchair. ASSESSMENT   Diagnoses and all orders for this visit:    1. Lumbar postlaminectomy syndrome    2.  DDD (degenerative disc disease), lumbar           IMPRESSIONS/RECOMMENDATIONS:  Patient presents today with c/o chronic low back pain, denies injury. Previously had radicular sxs, however no longer experiencing this. I will notify Andrew Flores MD of abdominal aortic aneurysm noted in previous MRI film. The patient does not think his remaining pain complaints are severe enough to warrant additional workup/treatment at this time. I recommend he increase the frequency of HEP to daily. Multiple treatment options were discussed. Patient is neurologically intact. I will see the patient back prn. Written by Mir Spence, as dictated by Rhoda Molina MD  I examined the patient, reviewed and agree with the note.

## 2019-12-19 DIAGNOSIS — I10 ESSENTIAL HYPERTENSION, BENIGN: ICD-10-CM

## 2019-12-19 NOTE — TELEPHONE ENCOUNTER
PCP: Carolyne French MD    Last appt: 8/28/2019  Future Appointments   Date Time Provider Kin Mi   1/17/2020  2:00 PM Carolyne French MD \[facility]\"" [facility]       Requested Prescriptions     Pending Prescriptions Disp Refills    amLODIPine (NORVASC) 5 mg tablet 90 Tab 3     Sig: TAKE 1 TABLET DAILY       Prior labs and Blood pressures:  BP Readings from Last 3 Encounters:   12/13/19 138/80   11/15/19 130/60   10/21/19 139/61     Lab Results   Component Value Date/Time    Sodium 142 10/02/2019 12:20 PM    Potassium 4.9 10/02/2019 12:20 PM    Chloride 107 10/02/2019 12:20 PM    CO2 29 10/02/2019 12:20 PM    Anion gap 6 10/02/2019 12:20 PM    Glucose 126 (H) 10/02/2019 12:20 PM    BUN 53 (H) 10/02/2019 12:20 PM    Creatinine 0.73 10/02/2019 12:20 PM    BUN/Creatinine ratio 73 (H) 10/02/2019 12:20 PM    GFR est AA >60 10/02/2019 12:20 PM    GFR est non-AA >60 10/02/2019 12:20 PM    Calcium 9.9 10/02/2019 12:20 PM     Lab Results   Component Value Date/Time    Hemoglobin A1c 6.0 (H) 07/08/2019 11:58 AM    Hemoglobin A1c (POC) 6.5 (A) 01/08/2019 10:37 AM     Lab Results   Component Value Date/Time    Cholesterol, total 114 07/08/2019 11:59 AM    Cholesterol (POC) 111.0 01/08/2019 10:37 AM    HDL Cholesterol 27 (L) 07/08/2019 11:59 AM    HDL Cholesterol (POC) 32.0 (A) 01/08/2019 10:37 AM    LDL Cholesterol (POC) 54.4 01/08/2019 10:37 AM    LDL, calculated 50 07/08/2019 11:59 AM    VLDL, calculated 25 07/03/2018 09:34 AM    VLDL 37 07/08/2019 11:59 AM    Triglyceride 183 07/08/2019 11:59 AM    Triglycerides (POC) 123.0 01/08/2019 10:37 AM    CHOL/HDL Ratio 4 07/08/2019 11:59 AM     Lab Results   Component Value Date/Time    Vitamin D 25-Hydroxy 23.6 (L) 10/09/2015 01:16 PM       Lab Results   Component Value Date/Time    TSH 0.29 (L) 10/02/2019 12:20 PM

## 2019-12-22 RX ORDER — AMLODIPINE BESYLATE 5 MG/1
TABLET ORAL
Qty: 30 TAB | Refills: 0 | Status: SHIPPED | OUTPATIENT
Start: 2019-12-22 | End: 2020-01-09 | Stop reason: SDUPTHER

## 2020-01-13 RX ORDER — MOMETASONE FUROATE 1 MG/G
CREAM TOPICAL
Qty: 180 G | Refills: 3 | Status: SHIPPED | OUTPATIENT
Start: 2020-01-13

## 2020-01-13 RX ORDER — FUROSEMIDE 80 MG/1
120 TABLET ORAL DAILY
Qty: 145 TAB | Refills: 2 | Status: SHIPPED | OUTPATIENT
Start: 2020-01-13

## 2020-01-13 NOTE — TELEPHONE ENCOUNTER
This pharmacy faxed over request for the following prescriptions to be filled:    Medication requested :   Requested Prescriptions     Pending Prescriptions Disp Refills    glucose blood VI test strips (FREESTYLE LITE STRIPS) strip 100 Strip 3    furosemide (LASIX) 80 mg tablet 145 Tab 2     Sig: Take 1.5 Tabs by mouth daily.     mometasone (ELOCON) 0.1 % topical cream 180 g 3     Sig: APPLY 1 APPLICATION TWICE A DAY     PCP: [facility] or Print: [facility]   Mail order or Local pharmacy Mail Order     Scheduled appointment if not seen by current providers in office:  [address] [place] [address] 11/15/2019 f/u 3/11/2020

## 2020-03-11 ENCOUNTER — OFFICE VISIT (OUTPATIENT)
Dept: FAMILY MEDICINE CLINIC | Age: 85
End: 2020-03-11

## 2020-03-11 ENCOUNTER — HOSPITAL ENCOUNTER (OUTPATIENT)
Dept: LAB | Age: 85
Discharge: HOME | End: 2020-03-11
Payer: MEDICARE

## 2020-03-11 VITALS
HEART RATE: 57 BPM | RESPIRATION RATE: 16 BRPM | OXYGEN SATURATION: 97 % | SYSTOLIC BLOOD PRESSURE: 104 MMHG | TEMPERATURE: 97.5 F | HEIGHT: 71 IN | BODY MASS INDEX: 29.01 KG/M2 | DIASTOLIC BLOOD PRESSURE: 50 MMHG

## 2020-03-11 DIAGNOSIS — I10 ESSENTIAL HYPERTENSION, BENIGN: ICD-10-CM

## 2020-03-11 DIAGNOSIS — E11.9 WELL CONTROLLED DIABETES MELLITUS (HCC): ICD-10-CM

## 2020-03-11 DIAGNOSIS — R20.0 NUMBNESS AND TINGLING OF BOTH FEET: ICD-10-CM

## 2020-03-11 DIAGNOSIS — R79.89 LOW TSH LEVEL: ICD-10-CM

## 2020-03-11 DIAGNOSIS — R79.89 ABNORMAL CBC: ICD-10-CM

## 2020-03-11 DIAGNOSIS — I71.40 ABDOMINAL AORTIC ANEURYSM (AAA) WITHOUT RUPTURE: ICD-10-CM

## 2020-03-11 DIAGNOSIS — R20.2 NUMBNESS AND TINGLING OF BOTH FEET: ICD-10-CM

## 2020-03-11 DIAGNOSIS — N40.0 BENIGN PROSTATIC HYPERPLASIA WITHOUT LOWER URINARY TRACT SYMPTOMS: ICD-10-CM

## 2020-03-11 DIAGNOSIS — M79.89 LEG SWELLING: ICD-10-CM

## 2020-03-11 DIAGNOSIS — E87.1 HYPONATREMIA: ICD-10-CM

## 2020-03-11 DIAGNOSIS — J44.9 CHRONIC OBSTRUCTIVE PULMONARY DISEASE, UNSPECIFIED COPD TYPE (HCC): Primary | ICD-10-CM

## 2020-03-11 DIAGNOSIS — R15.9 INCONTINENCE OF FECES, UNSPECIFIED FECAL INCONTINENCE TYPE: ICD-10-CM

## 2020-03-11 LAB
ALBUMIN SERPL-MCNC: 3.8 G/DL (ref 3.4–5)
ALBUMIN/GLOB SERPL: 1 {RATIO} (ref 0.8–1.7)
ALP SERPL-CCNC: 79 U/L (ref 45–117)
ALT SERPL-CCNC: 34 U/L (ref 16–61)
ANION GAP SERPL CALC-SCNC: 10 MMOL/L (ref 3–18)
AST SERPL-CCNC: 27 U/L (ref 10–38)
BILIRUB SERPL-MCNC: 0.3 MG/DL (ref 0.2–1)
BUN SERPL-MCNC: 87 MG/DL (ref 7–18)
BUN/CREAT SERPL: 54 (ref 12–20)
CALCIUM SERPL-MCNC: 9.1 MG/DL (ref 8.5–10.1)
CHLORIDE SERPL-SCNC: 111 MMOL/L (ref 100–111)
CO2 SERPL-SCNC: 21 MMOL/L (ref 21–32)
CREAT SERPL-MCNC: 1.6 MG/DL (ref 0.6–1.3)
EST. AVERAGE GLUCOSE BLD GHB EST-MCNC: ABNORMAL MG/DL
GLOBULIN SER CALC-MCNC: 3.7 G/DL (ref 2–4)
GLUCOSE SERPL-MCNC: 124 MG/DL (ref 74–99)
HBA1C MFR BLD: <3.5 % (ref 4.2–5.6)
POTASSIUM SERPL-SCNC: 4.6 MMOL/L (ref 3.5–5.5)
PROT SERPL-MCNC: 7.5 G/DL (ref 6.4–8.2)
SODIUM SERPL-SCNC: 142 MMOL/L (ref 136–145)
TSH SERPL DL<=0.05 MIU/L-ACNC: 4.38 UIU/ML (ref 0.36–3.74)

## 2020-03-11 PROCEDURE — 80053 COMPREHEN METABOLIC PANEL: CPT

## 2020-03-11 PROCEDURE — 36415 COLL VENOUS BLD VENIPUNCTURE: CPT

## 2020-03-11 PROCEDURE — 83036 HEMOGLOBIN GLYCOSYLATED A1C: CPT

## 2020-03-11 PROCEDURE — 84443 ASSAY THYROID STIM HORMONE: CPT

## 2020-03-11 RX ORDER — ATORVASTATIN CALCIUM 40 MG/1
40 TABLET, FILM COATED ORAL
Qty: 90 TAB | Refills: 1 | Status: SHIPPED | OUTPATIENT
Start: 2020-03-11

## 2020-03-11 RX ORDER — OMEGA-3-ACID ETHYL ESTERS 1 G/1
2 CAPSULE, LIQUID FILLED ORAL 2 TIMES DAILY
Qty: 360 CAP | Refills: 1 | Status: SHIPPED | OUTPATIENT
Start: 2020-03-11

## 2020-03-11 RX ORDER — AMLODIPINE BESYLATE 5 MG/1
TABLET ORAL
Qty: 90 TAB | Refills: 0 | Status: SHIPPED | OUTPATIENT
Start: 2020-03-11 | End: 2020-04-08 | Stop reason: SDUPTHER

## 2020-03-11 RX ORDER — LISINOPRIL 40 MG/1
40 TABLET ORAL DAILY
Qty: 90 TAB | Refills: 0 | Status: SHIPPED | OUTPATIENT
Start: 2020-03-11 | End: 2020-04-08 | Stop reason: SDUPTHER

## 2020-03-11 NOTE — PATIENT INSTRUCTIONS
Abdominal Aortic Aneurysm: Care Instructions Your Care Instructions An abdominal aortic aneurysm is a stretched and bulging area of the aorta. The aorta is the large blood vessel that takes oxygen-rich blood from the heart to the rest of the body. This type of aneurysm is in the belly, where the aorta takes blood to the lower body. If an aneurysm gets too big, it can cause serious problems. A bulging aorta is weak and can burst, or rupture. This causes life-threatening bleeding. If your doctor has determined that your aneurysm is small and not growing fast, it is safe to watch the aneurysm carefully and wait on surgery. If the aneurysm is larger, surgery may be the safest choice. In some cases, your doctor may be able to put in a type of graft, called a stent, to fix the aneurysm without doing major surgery. Follow-up care is a key part of your treatment and safety. Be sure to make and go to all appointments, and call your doctor if you are having problems. It's also a good idea to know your test results and keep a list of the medicines you take. How can you care for yourself at home? · Take your medicines exactly as prescribed. Call your doctor if you think you are having a problem with your medicine. You may take medicine to help lower blood pressure and cholesterol. · Follow a heart-healthy diet. ? Eat lots of fruits and vegetables, whole grains, fish, and low-fat or nonfat dairy foods. ? Eat lean meats. Limit saturated fat and avoid trans fat. ? Limit processed food, sodium, alcohol, and sweets. · If your doctor recommends it, get more exercise. Walking is a good choice. Bit by bit, increase the amount you walk every day. Try for at least 30 minutes on most days of the week. · Talk to your doctor about when you can do more active workouts. · Manage your weight.  Being at a healthy weight will not likely change your aortic aneurysm, but it may lower the chance of complications if you ever need surgery. · Do not smoke or allow others to smoke around you. Smoking can make your condition worse. If you need help quitting, talk to your doctor about stop-smoking programs and medicines. These can increase your chances of quitting for good. When should you call for help? Call 911 anytime you think you may need emergency care. For example, call if: 
  · You have severe pain in your belly, back, or chest.  
  · You passed out (lost consciousness).  
  · You have severe trouble breathing.  
 Call your doctor now or seek immediate medical care if: 
  · You are dizzy or lightheaded, or you feel like you may faint.  
  · One or both feet change color, are painful, feel cool, or burn or tingle.  
 Watch closely for changes in your health, and be sure to contact your doctor if you have any problems. Where can you learn more? Go to http://julio césar-precious.info/. Enter H600 in the search box to learn more about \"Abdominal Aortic Aneurysm: Care Instructions. \" Current as of: September 26, 2018 Content Version: 12.2 © 2720-2448 [employer]. Care instructions adapted under license by [employer] (which disclaims liability or warranty for this information). If you have questions about a medical condition or this instruction, always ask your healthcare professional. [address] any warranty or liability for your use of this information.

## 2020-03-11 NOTE — PROGRESS NOTES
HISTORY OF PRESENT ILLNESS  Alicia Beatty is a 82 y.o. male. HPI: Here for follow-up. History of COPD. Been stable. Denies any chronic cough or wheezing. No shortness of breath or chest pain. No palpitation or diaphoresis. History of hypertension. Vitals been stable. Asymptomatic. Compliance with medication and no side effects. History of leg swelling. He was told to take the half dose of Lasix but he needs to take the full dose as half dose was worsening his leg swelling. Currently no pitting edema. No shortness of breath, wheezing or any chest discomfort. He had some fecal incontinence. Since Dr. Es Agustin.  Was given some Imodium. Since then it has been improved. No concern at this time. Patient was accompanied by his caretaker. No concerns from the caretaker and she agreed that fecal incontinence concern has been improved. Hyponatremia. Repeat labs showed improvement after DC hydrochlorothiazide. He was also having some urinary frequency at nighttime. His hydrochlorothiazide was DC'd. Since then it has been better. History of diabetes. Well controlled. Taking metformin. No signs of hypoglycemia. Denies any other concern at this time. His fecal incontinence has been improved. If it reoccurs will consider stopping metformin. Has history of chronic back pain. Following spine center. Seeing Dr. Guyann Meigs. For now symptomatic treatment. His feet numbness and tingling been much more better compared to last visit. Will observe at this time. History of BPH. No concern at this time. Noted on MRI of the lumbar spine from 2016 aortic aneurysm 3.2 cm in size. No repeat study been done since then. Has history of hypertension. Also former smoker. We will consider getting the repeat ultrasound. He agrees on plan. Had a low WBC, low platelet, repeat blood test showed some improvement. Will observe at this time. No signs of bleeding. He lives alone.   The caretaker helps for AD[employer].  He was sitting on a wheelchair during the visit. Denies any concern at this time. Visit Vitals  /50 (BP 1 Location: Left arm, BP Patient Position: Sitting)   Pulse (!) 57   Temp 97.5 °F (36.4 °C) (Oral)   Resp 16   Ht 5' 11\" (1.803 m)   SpO2 97%   BMI 29.01 kg/m²     Review medication list, vitals, problem list,allergies. Review labs. Lab Results   Component Value Date/Time    WBC 8.4 10/02/2019 12:20 PM    Hemoglobin (POC) 16.7 06/26/2012 08:28 AM    HGB (POC) 11.8 (A) 01/08/2019 10:37 AM    HGB 11.3 (L) 10/02/2019 12:20 PM    Hematocrit (POC) 49 06/26/2012 08:28 AM    HCT (POC) 35.5 (A) 01/08/2019 10:37 AM    HCT 37.1 10/02/2019 12:20 PM    PLATELET 158 56/46/5521 12:20 PM    MCV 90.5 10/02/2019 12:20 PM     Lab Results   Component Value Date/Time    Sodium 142 10/02/2019 12:20 PM    Potassium 4.9 10/02/2019 12:20 PM    Chloride 107 10/02/2019 12:20 PM    CO2 29 10/02/2019 12:20 PM    Anion gap 6 10/02/2019 12:20 PM    Glucose 126 (H) 10/02/2019 12:20 PM    BUN 53 (H) 10/02/2019 12:20 PM    Creatinine 0.73 10/02/2019 12:20 PM    BUN/Creatinine ratio 73 (H) 10/02/2019 12:20 PM    GFR est AA >60 10/02/2019 12:20 PM    GFR est non-AA >60 10/02/2019 12:20 PM    Calcium 9.9 10/02/2019 12:20 PM    Bilirubin, total 0.4 10/02/2019 12:20 PM    AST (SGOT) 19 10/02/2019 12:20 PM    Alk.  phosphatase 92 10/02/2019 12:20 PM    Protein, total 7.7 10/02/2019 12:20 PM    Albumin 3.9 10/02/2019 12:20 PM    Globulin 3.8 10/02/2019 12:20 PM    A-G Ratio 1.0 10/02/2019 12:20 PM    ALT (SGPT) 24 10/02/2019 12:20 PM     Lab Results   Component Value Date/Time    Cholesterol, total 114 07/08/2019 11:59 AM    Cholesterol (POC) 111.0 01/08/2019 10:37 AM    HDL Cholesterol 27 (L) 07/08/2019 11:59 AM    HDL Cholesterol (POC) 32.0 (A) 01/08/2019 10:37 AM    LDL Cholesterol (POC) 54.4 01/08/2019 10:37 AM    LDL, calculated 50 07/08/2019 11:59 AM    VLDL, calculated 25 07/03/2018 09:34 AM    VLDL 37 07/08/2019 11:59 AM Triglyceride 183 07/08/2019 11:59 AM    Triglycerides (POC) 123.0 01/08/2019 10:37 AM    CHOL/HDL Ratio 4 07/08/2019 11:59 AM     Lab Results   Component Value Date/Time    TSH 0.29 (L) 10/02/2019 12:20 PM     Lab Results   Component Value Date/Time    Hemoglobin A1c 6.0 (H) 07/08/2019 11:58 AM    Hemoglobin A1c (POC) 6.5 (A) 01/08/2019 10:37 AM     Lab Results   Component Value Date/Time    Vitamin D 25-Hydroxy 23.6 (L) 10/09/2015 01:16 PM       Lab Results   Component Value Date/Time    Microalbumin urine (POC) 20 01/08/2019 10:02 AM     Noted low TSH on prior labs. He is supposed to see the endocrinology. He said he had to go out of town so had to reschedule the appointment. For now we will repeat the thyroid test.  Currently his stool incontinence has been improved. Could be the partly from thyroid abnormality. We will repeat the test and follow-up after results. Denies any unusual fatigue. No skin changes. No heat or cold intolerance. No trouble swallowing. ROS: See HPI  Denies any headache, chest pain, shortness of breath, dizziness, nausea or vomiting, abdominal pain, mood changes. Sleep is fair. Physical Exam  Constitutional:       General: He is not in acute distress. Neck:      Musculoskeletal: Neck supple. Cardiovascular:      Rate and Rhythm: Normal rate and regular rhythm. Heart sounds: Normal heart sounds. Abdominal:      General: Bowel sounds are normal.      Palpations: Abdomen is soft. Tenderness: There is no abdominal tenderness. Lymphadenopathy:      Cervical: No cervical adenopathy. Neurological:      Mental Status: He is oriented to person, place, and time. Psychiatric:         Behavior: Behavior normal.         ASSESSMENT and PLAN    ICD-10-CM ICD-9-CM    1. Chronic obstructive pulmonary disease, unspecified [facility] type [facility] Fairly stable. No increased use of albuterol. Will observe J44.9 496    2. Essential hypertension, benign: Fairly stable.   Will continue current plan. Low-salt diet I10 401.1 lisinopriL (PRINIVIL, ZESTRIL) 40 mg tablet      amLODIPine (NORVASC) 5 mg tablet   3. Incontinence of feces, unspecified fecal incontinence type: Seen Dr. Kadi Thapa. Given symptomatic treatment with Imodium. Problem has been improved. R15.9 787.60     following Dr. Donato Pugh    4. Leg swelling: No pitting edema during visit. He was told to take the half tablet of Lasix but he needed the full tablet. Will observe currently. Repeat the BMP. M79.89 729.81    5. Hyponatremia: Repeat blood test after stopping HCTZ showed improvement. Will observe at this time. E87.1 276.1    6. Low TSH level: Supposed to see the endocrinologist but has failed to see them as he had to travel outside the town. Currently he will reschedule the appointment. We will repeat the TSH meantime. R79.89 794.5 TSH 3RD GENERATION   7. Well controlled diabetes mellitus ([facility]): A1c in prediabetic range. We will repeat the labs. Continue the diet modification and current plan. On statin, metformin and lisinopril. Will continue current plan E11.9 250.00 atorvastatin (LIPITOR) 40 mg tablet      HEMOGLOBIN A1C WITH EAG      METABOLIC PANEL, COMPREHENSIVE   8. Benign prostatic hyperplasia without lower urinary tract symptoms: Fairly stable. Asymptomatic during visit. We will continue current plan N40.0 600.00    9. Numbness and tingling of both feet: Seen spine center. No new recommendations at this time. His numbness and tingling in both feet has been improved at this time. R20.0 782.0     R20.2      also chronic back pain. sent to spine center    10. Abdominal aortic aneurysm (AAA) without rupture [facility]): Noted on a prior MRI of the lumbar spine aortic aneurysm 3.2 cm back in 2016. No follow-up ultrasound been done since then. We will go ahead and repeat the ultrasound. He is been a former smoker, history of hypertension. Has been following the vascular specialist at [facility].   Dr. Esther Sandoval Lynette Mcallister.  Once we do the ultrasound if needed will do the referral. I71.4 441.4 US RETROPERITONEUM COMP    3.2 in size. infrarenal from MRI spine in 2016. 11. Abnormal CBC: Will observe. Repeat labs showed WBC within normal limit and platelet within normal limits. R79.89 790.6    Patient understood and agree with above plan. We will follow him up in a month. We will try to do wellness at that time. Follow-up and Dispositions    · Return in about 1 month (around 4/11/2020).

## 2020-03-11 NOTE — PROGRESS NOTES
1. Have you been to the ER, urgent care clinic since your last visit? Hospitalized since your last visit? [facility]) for UTI LOV: 3/07/2020 - completed Bactrim DS on 3/10/2020.    2. Have you seen or consulted any other health care providers outside of the [address] [place] [address] since your last visit? Include any pap smears or colon screening.  No    Chief Complaint   Patient presents with    Annual Wellness Visit     Dm foot exam - not completed

## 2020-03-12 NOTE — PROGRESS NOTES
tsh been elevated now. For now please advise pt to schedule endo appt. Also HBA1C in normal range. Can hold metformin for now. Also elevated cr. Send labs to his nephrology as well for further recommendations. Advise alternate day half dose of lasix for now till further instructions from nephrology.

## 2020-04-08 DIAGNOSIS — I10 ESSENTIAL HYPERTENSION, BENIGN: ICD-10-CM

## 2020-04-09 RX ORDER — AMLODIPINE BESYLATE 5 MG/1
TABLET ORAL
Qty: 90 TAB | Refills: 0 | Status: SHIPPED | OUTPATIENT
Start: 2020-04-09 | End: 2020-07-24

## 2020-04-09 RX ORDER — LISINOPRIL 40 MG/1
40 TABLET ORAL DAILY
Qty: 90 TAB | Refills: 0 | Status: SHIPPED | OUTPATIENT
Start: 2020-04-09 | End: 2020-07-24

## 2020-04-14 DIAGNOSIS — R79.89 ELEVATED TSH: ICD-10-CM

## 2020-04-14 DIAGNOSIS — N28.9 RENAL INSUFFICIENCY: Primary | ICD-10-CM

## 2020-04-14 NOTE — PROGRESS NOTES
He was sent to Endo previously but did not keep an appt. Done a new referral to endo and to nephrology as well.

## 2020-04-15 NOTE — PROGRESS NOTES
Routing message to nurse Debi December to discuss results with patient. I will submit referrals to endo and nephrology offices.

## 2020-04-17 NOTE — PROGRESS NOTES
Patient identified with 2 identifiers (name and ). Patient aware that his TSH is elevated and Dr. Ruby Miller is requesting he see a Endocrine. Patient states he will hold off on the referral right now. Patient also advised on Hgb A1c is normal and he is to stop his metformin. Verbalizes understanding. Patient was made aware of elevated creatinine. pateint states he is not seeing a Nephrologist at this time and would like to discuss with Dr. Ruby Miller at his next appt. Advise alternate day half dose of lasix for now till further instructions from nephrology. patient verbalizes understanding.

## 2020-07-16 RX ORDER — ATENOLOL 50 MG/1
50 TABLET ORAL DAILY
Qty: 90 TAB | Refills: 1 | Status: SHIPPED | OUTPATIENT
Start: 2020-07-16

## 2020-07-16 RX ORDER — ATENOLOL 50 MG/1
TABLET ORAL
Qty: 90 TAB | Refills: 3 | OUTPATIENT
Start: 2020-07-16

## 2020-07-16 NOTE — TELEPHONE ENCOUNTER
PCP: Roger Caballero MD    Last appt: 8/28/2019  No future appointments.     Requested Prescriptions     Pending Prescriptions Disp Refills    atenoloL (TENORMIN) 50 mg tablet [Pharmacy Med Name: ATENOLOL TABS 50MG] 90 Tab 3     Sig: TAKE 1 TABLET DAILY       Prior labs and Blood pressures:  BP Readings from Last 3 Encounters:   03/11/20 104/50   12/13/19 138/80   11/15/19 130/60     Lab Results   Component Value Date/Time    Sodium 142 03/11/2020 04:16 PM    Potassium 4.6 03/11/2020 04:16 PM    Chloride 111 03/11/2020 04:16 PM    CO2 21 03/11/2020 04:16 PM    Anion gap 10 03/11/2020 04:16 PM    Glucose 124 (H) 03/11/2020 04:16 PM    BUN 87 (H) 03/11/2020 04:16 PM    Creatinine 1.60 (H) 03/11/2020 04:16 PM    BUN/Creatinine ratio 54 (H) 03/11/2020 04:16 PM    GFR est AA 50 (L) 03/11/2020 04:16 PM    GFR est non-AA 41 (L) 03/11/2020 04:16 PM    Calcium 9.1 03/11/2020 04:16 PM     Lab Results   Component Value Date/Time    Hemoglobin A1c <3.5 (L) 03/11/2020 04:16 PM    Hemoglobin A1c (POC) 6.5 (A) 01/08/2019 10:37 AM     Lab Results   Component Value Date/Time    Cholesterol, total 114 07/08/2019 11:59 AM    Cholesterol (POC) 111.0 01/08/2019 10:37 AM    HDL Cholesterol 27 (L) 07/08/2019 11:59 AM    HDL Cholesterol (POC) 32.0 (A) 01/08/2019 10:37 AM    LDL Cholesterol (POC) 54.4 01/08/2019 10:37 AM    LDL, calculated 50 07/08/2019 11:59 AM    VLDL, calculated 25 07/03/2018 09:34 AM    VLDL 37 07/08/2019 11:59 AM    Triglyceride 183 07/08/2019 11:59 AM    Triglycerides (POC) 123.0 01/08/2019 10:37 AM    CHOL/HDL Ratio 4 07/08/2019 11:59 AM     Lab Results   Component Value Date/Time    Vitamin D 25-Hydroxy 23.6 (L) 10/09/2015 01:16 PM       Lab Results   Component Value Date/Time    TSH 4.38 (H) 03/11/2020 04:16 PM

## 2020-07-24 DIAGNOSIS — I10 ESSENTIAL HYPERTENSION, BENIGN: ICD-10-CM

## 2020-07-24 RX ORDER — AMLODIPINE BESYLATE 5 MG/1
TABLET ORAL
Qty: 90 TAB | Refills: 1 | Status: SHIPPED | OUTPATIENT
Start: 2020-07-24

## 2020-07-24 RX ORDER — LISINOPRIL 40 MG/1
TABLET ORAL
Qty: 90 TAB | Refills: 1 | Status: SHIPPED | OUTPATIENT
Start: 2020-07-24

## 2020-07-24 NOTE — LETTER
7/29/2020 3:22 PM 
 
Mr. Rivera David 
[address] [address] [place]  Eldon Adam: 
 
Jodee Ellington missed you! Please call our office at [phone] and schedule a follow up appointment for your continued care. Sincerely, Tiffanie Saini MD

## 2020-09-02 RX ORDER — FLUTICASONE PROPIONATE 50 MCG
SPRAY, SUSPENSION (ML) NASAL
Qty: 2880 G | Refills: 1 | Status: SHIPPED | OUTPATIENT
Start: 2020-09-02

## 2020-09-02 RX ORDER — FLUTICASONE PROPIONATE 50 MCG
SPRAY, SUSPENSION (ML) NASAL
Refills: 3 | OUTPATIENT
Start: 2020-09-02

## 2021-12-29 ENCOUNTER — COMPLETE EYE EXAM (OUTPATIENT)
Dept: URBAN - METROPOLITAN AREA CLINIC 34 | Facility: CLINIC | Age: 86
End: 2021-12-29

## 2021-12-29 DIAGNOSIS — H52.4: ICD-10-CM

## 2021-12-29 DIAGNOSIS — H31.011: ICD-10-CM

## 2021-12-29 DIAGNOSIS — Z96.1: ICD-10-CM

## 2021-12-29 PROCEDURE — 92015 DETERMINE REFRACTIVE STATE: CPT

## 2021-12-29 PROCEDURE — 92134 CPTRZ OPH DX IMG PST SGM RTA: CPT

## 2021-12-29 PROCEDURE — 92004 COMPRE OPH EXAM NEW PT 1/>: CPT

## 2021-12-29 ASSESSMENT — VISUAL ACUITY
OS_SC: 20/60-1
OD_SC: 20/50

## 2021-12-29 ASSESSMENT — KERATOMETRY
OS_K1POWER_DIOPTERS: 43.25
OD_AXISANGLE2_DEGREES: 2
OS_AXISANGLE2_DEGREES: 2
OS_K2POWER_DIOPTERS: 44.25
OD_K2POWER_DIOPTERS: 43.75
OS_AXISANGLE_DEGREES: 92
OD_K1POWER_DIOPTERS: 43.25
OD_AXISANGLE_DEGREES: 92

## 2021-12-29 ASSESSMENT — TONOMETRY
OD_IOP_MMHG: 8
OS_IOP_MMHG: 8

## 2024-01-19 NOTE — TELEPHONE ENCOUNTER
PCP: Ronak Pritchard MD    Last appt: 7/8/2019  Future Appointments   Date Time Provider Kin [place]   8/20/2019 10:15 AM Nicolasa De La Garza NP [facility] SCHED   10/14/2019  9:30 AM Ed Jefferson MD [facility] SCHED       Requested Prescriptions     Pending Prescriptions Disp Refills    atenolol (TENORMIN) 50 mg tablet [Pharmacy Med Name: ATENOLOL TABS 50MG] 90 Tab 1     Sig: TAKE 1 TABLET DAILY       Prior labs and Blood pressures:  BP Readings from Last 3 Encounters:   07/08/19 119/66   04/08/19 132/64   01/08/19 132/54     Lab Results   Component Value Date/Time    Sodium 144 07/08/2019 11:59 AM    Potassium 5.5 (H) 07/08/2019 11:59 AM    Chloride 106 07/08/2019 11:59 AM    CO2 25.0 07/08/2019 11:59 AM    Anion gap 13 07/08/2019 11:59 AM    Glucose 145 (H) 07/08/2019 11:59 AM    BUN 45.0 (H) 07/08/2019 11:59 AM    Creatinine 0.7 (L) 07/08/2019 11:59 AM    BUN/Creatinine ratio 64 07/08/2019 11:59 AM    GFR est AA >60 07/08/2019 11:59 AM    GFR est non-AA >60 07/08/2019 11:59 AM    Calcium 9.8 07/08/2019 11:59 AM     Lab Results   Component Value Date/Time    Hemoglobin A1c 6.0 (H) 07/08/2019 11:58 AM    Hemoglobin A1c (POC) 6.5 (A) 01/08/2019 10:37 AM     Lab Results   Component Value Date/Time    Cholesterol, total 114 07/08/2019 11:59 AM    Cholesterol (POC) 111.0 01/08/2019 10:37 AM    HDL Cholesterol 27 (L) 07/08/2019 11:59 AM    HDL Cholesterol (POC) 32.0 (A) 01/08/2019 10:37 AM    LDL Cholesterol (POC) 54.4 01/08/2019 10:37 AM    LDL, calculated 50 07/08/2019 11:59 AM    VLDL, calculated 25 07/03/2018 09:34 AM    VLDL 37 07/08/2019 11:59 AM    Triglyceride 183 07/08/2019 11:59 AM    Triglycerides (POC) 123.0 01/08/2019 10:37 AM    CHOL/HDL Ratio 4 07/08/2019 11:59 AM     Lab Results   Component Value Date/Time    Vitamin D 25-Hydroxy 23.6 (L) 10/09/2015 01:16 PM       Lab Results   Component Value Date/Time    TSH 2.29 03/12/2015 06:05 PM Quality 47: Advance Care Plan: Advance Care Planning discussed and documented; advance care plan or surrogate decision maker documented in the medical record. Detail Level: Detailed Quality 111:Pneumonia Vaccination Status For Older Adults: Pneumococcal Vaccination Previously Received Quality 110: Preventive Care And Screening: Influenza Immunization: Influenza Immunization previously received during influenza season Quality 431: Preventive Care And Screening: Unhealthy Alcohol Use - Screening: Patient screened for unhealthy alcohol use using a single question and scores less than 2 times per year Quality 226: Preventive Care And Screening: Tobacco Use: Screening And Cessation Intervention: Patient screened for tobacco use and is an ex/non-smoker Quality 130: Documentation Of Current Medications In The Medical Record: Current Medications Documented

## (undated) DEVICE — SUTURE VCRL SZ 3-0 L27IN ABSRB UD L26MM SH 1/2 CIR J416H

## (undated) DEVICE — 1200 GUARD II KIT W/5MM TUBE W/O VAC TUBE: Brand: GUARDIAN

## (undated) DEVICE — SUTURE VCRL SZ 4-0 L27IN ABSRB UD L19MM PS-2 3/8 CIR PRIM J426H

## (undated) DEVICE — HEX-LOCKING BLADE ELECTRODE: Brand: EDGE

## (undated) DEVICE — BLADE ASSEMB CLP HAIR FINE --

## (undated) DEVICE — HANDLE LT SNAP ON ULT DURABLE LENS FOR TRUMPF ALC DISPOSABLE

## (undated) DEVICE — DERMABOND SKIN ADH 0.7ML -- DERMABOND ADVANCED 12/BX

## (undated) DEVICE — BAG SPEC BIOHZRD 10 X 10 IN --

## (undated) DEVICE — NEEDLE HYPO 25GA L1.5IN BVL ORIENTED ECLIPSE

## (undated) DEVICE — INFECTION CONTROL KIT SYS

## (undated) DEVICE — (D)SYR 10ML 1/5ML GRAD NSAF -- PKGING CHANGE USE ITEM 338027

## (undated) DEVICE — ROCKER SWITCH PENCIL HOLSTER: Brand: VALLEYLAB

## (undated) DEVICE — FORCEPS BX L160CM DIA8MM GRSP DISECT CUP TIP NONLOCKING ROT

## (undated) DEVICE — REM POLYHESIVE ADULT PATIENT RETURN ELECTRODE: Brand: VALLEYLAB

## (undated) DEVICE — SET ADMIN 16ML TBNG L100IN 2 Y INJ SITE IV PIGGY BK DISP

## (undated) DEVICE — SUTURE VCRL SZ 3-0 L54IN ABSRB VLT LIGAPAK REEL NDL J205G

## (undated) DEVICE — KENDALL RADIOLUCENT FOAM MONITORING ELECTRODE RECTANGULAR SHAPE: Brand: KENDALL

## (undated) DEVICE — SURGICAL PROCEDURE PACK BASIN MAJ SET CUST NO CAUT

## (undated) DEVICE — CATH IV AUTOGRD BC BLU 22GA 25 -- INSYTE

## (undated) DEVICE — SUTURE PROL SZ 0 L30IN NONABSORBABLE BLU L26MM CT-2 1/2 CIR 8412H

## (undated) DEVICE — NEEDLE HYPO 18GA L1.5IN PNK S STL HUB POLYPR SHLD REG BVL

## (undated) DEVICE — TOWEL 4 PLY TISS 19X30 SUE WHT

## (undated) DEVICE — GOWN,SIRUS,NONRNF,SETINSLV,2XL,18/CS: Brand: MEDLINE

## (undated) DEVICE — SYR 3ML LL TIP 1/10ML GRAD --

## (undated) DEVICE — DRAIN WND PENRS RADPQ 0.25X18 -- CONVERT TO ITEM 360112

## (undated) DEVICE — SUT SLK 0 30IN SH BLK --

## (undated) DEVICE — DEVON™ KNEE AND BODY STRAP 60" X 3" (1.5 M X 7.6 CM): Brand: DEVON

## (undated) DEVICE — ESOPHAGEAL BALLOON DILATATION CATHETER: Brand: CRE FIXED WIRE

## (undated) DEVICE — SOLIDIFIER MEDC 1200ML -- CONVERT TO 356117

## (undated) DEVICE — (D)PREP SKN CHLRAPRP APPL 26ML -- CONVERT TO ITEM 371833

## (undated) DEVICE — BASIN EMESIS 500CC ROSE 250/CS 60/PLT: Brand: MEDEGEN MEDICAL PRODUCTS, LLC

## (undated) DEVICE — CONTAINER SPEC 20 ML LID NEUT BUFF FORMALIN 10 % POLYPR STS

## (undated) DEVICE — KENDALL SCD EXPRESS SLEEVES, KNEE LENGTH, MEDIUM: Brand: KENDALL SCD

## (undated) DEVICE — Device

## (undated) DEVICE — SYR ASSEMB INFL BLLN 60ML --

## (undated) DEVICE — Device: Brand: MEDEX

## (undated) DEVICE — CUFF ADULT 1 PC 1 VINYL DISP --

## (undated) DEVICE — MEDI-VAC YANK SUCT HNDL W/TPRD BULBOUS TIP: Brand: CARDINAL HEALTH

## (undated) DEVICE — BLOCK BITE ENDOSCP AD 21 MM W/ DIL BLU LF DISP

## (undated) DEVICE — NEEDLE HYPO 18GA L1IN PNK POLYPR HUB S STL REG BVL STR W/O

## (undated) DEVICE — Z DISCONTINUED PER MEDLINE LINE GAS SAMPLING O2/CO2 LNG AD 13 FT NSL W/ TBNG FILTERLINE

## (undated) DEVICE — STERILE POLYISOPRENE POWDER-FREE SURGICAL GLOVES: Brand: PROTEXIS

## (undated) DEVICE — SPONGE: SPECIALTY PEANUT XR 100/CS: Brand: MEDICAL ACTION INDUSTRIES

## (undated) DEVICE — SYR 10ML LUER LOK 1/5ML GRAD --

## (undated) DEVICE — DBD-PACK,LAPAROTOMY,2 REINFORCED GOWNS: Brand: MEDLINE